# Patient Record
Sex: FEMALE | Race: OTHER | HISPANIC OR LATINO | Employment: UNEMPLOYED | URBAN - METROPOLITAN AREA
[De-identification: names, ages, dates, MRNs, and addresses within clinical notes are randomized per-mention and may not be internally consistent; named-entity substitution may affect disease eponyms.]

---

## 2017-05-23 ENCOUNTER — HOSPITAL ENCOUNTER (EMERGENCY)
Facility: HOSPITAL | Age: 27
Discharge: HOME | End: 2017-05-23
Admitting: EMERGENCY MEDICINE

## 2017-05-23 VITALS
SYSTOLIC BLOOD PRESSURE: 126 MMHG | OXYGEN SATURATION: 95 % | TEMPERATURE: 98.1 F | RESPIRATION RATE: 16 BRPM | DIASTOLIC BLOOD PRESSURE: 68 MMHG | HEART RATE: 83 BPM | WEIGHT: 210 LBS

## 2017-05-23 DIAGNOSIS — B35.4 TINEA CORPORIS: Primary | ICD-10-CM

## 2017-05-23 PROCEDURE — 99282 EMERGENCY DEPT VISIT SF MDM: CPT

## 2017-05-23 RX ORDER — CLOTRIMAZOLE 1 %
1 CREAM (GRAM) TOPICAL 2 TIMES DAILY
Qty: 30 G | Refills: 0 | Status: SHIPPED | OUTPATIENT
Start: 2017-05-23 | End: 2017-07-14

## 2017-07-14 ENCOUNTER — HOSPITAL ENCOUNTER (EMERGENCY)
Facility: HOSPITAL | Age: 27
Discharge: HOME | End: 2017-07-14
Attending: EMERGENCY MEDICINE | Admitting: EMERGENCY MEDICINE
Payer: COMMERCIAL

## 2017-07-14 ENCOUNTER — APPOINTMENT (EMERGENCY)
Dept: RADIOLOGY | Facility: HOSPITAL | Age: 27
End: 2017-07-14
Payer: COMMERCIAL

## 2017-07-14 VITALS
DIASTOLIC BLOOD PRESSURE: 73 MMHG | RESPIRATION RATE: 18 BRPM | HEART RATE: 91 BPM | WEIGHT: 216.05 LBS | SYSTOLIC BLOOD PRESSURE: 132 MMHG | OXYGEN SATURATION: 97 % | TEMPERATURE: 97.4 F

## 2017-07-14 DIAGNOSIS — M25.552 LEFT HIP PAIN: Primary | ICD-10-CM

## 2017-07-14 PROCEDURE — 73502 X-RAY EXAM HIP UNI 2-3 VIEWS: CPT

## 2017-07-14 PROCEDURE — 99283 EMERGENCY DEPT VISIT LOW MDM: CPT

## 2017-07-14 PROCEDURE — 96372 THER/PROPH/DIAG INJ SC/IM: CPT

## 2017-07-14 RX ORDER — KETOROLAC TROMETHAMINE 30 MG/ML
15 INJECTION, SOLUTION INTRAMUSCULAR; INTRAVENOUS ONCE
Status: COMPLETED | OUTPATIENT
Start: 2017-07-14 | End: 2017-07-14

## 2017-07-14 RX ORDER — LIDOCAINE 50 MG/G
1 PATCH TOPICAL ONCE
Status: DISCONTINUED | OUTPATIENT
Start: 2017-07-14 | End: 2017-07-14 | Stop reason: HOSPADM

## 2017-07-14 RX ADMIN — LIDOCAINE 1 PATCH: 50 PATCH CUTANEOUS at 10:40

## 2017-07-14 RX ADMIN — KETOROLAC TROMETHAMINE 15 MG: 30 INJECTION, SOLUTION INTRAMUSCULAR at 10:07

## 2017-11-07 ENCOUNTER — HOSPITAL ENCOUNTER (EMERGENCY)
Facility: HOSPITAL | Age: 27
Discharge: HOME | End: 2017-11-07
Admitting: EMERGENCY MEDICINE
Payer: COMMERCIAL

## 2017-11-07 ENCOUNTER — APPOINTMENT (EMERGENCY)
Dept: RADIOLOGY | Facility: HOSPITAL | Age: 27
End: 2017-11-07
Payer: COMMERCIAL

## 2017-11-07 VITALS
DIASTOLIC BLOOD PRESSURE: 71 MMHG | TEMPERATURE: 98.7 F | WEIGHT: 220.2 LBS | OXYGEN SATURATION: 100 % | SYSTOLIC BLOOD PRESSURE: 130 MMHG | RESPIRATION RATE: 18 BRPM | HEART RATE: 89 BPM

## 2017-11-07 DIAGNOSIS — S46.911A STRAIN OF RIGHT SHOULDER, INITIAL ENCOUNTER: Primary | ICD-10-CM

## 2017-11-07 PROCEDURE — 99283 EMERGENCY DEPT VISIT LOW MDM: CPT

## 2017-11-07 PROCEDURE — 73030 X-RAY EXAM OF SHOULDER: CPT

## 2017-11-07 RX ORDER — CYCLOBENZAPRINE HCL 5 MG
5 TABLET ORAL 3 TIMES DAILY PRN
Qty: 30 TABLET | Refills: 0 | Status: SHIPPED | OUTPATIENT
Start: 2017-11-07 | End: 2018-08-20

## 2017-11-07 RX ORDER — KETOROLAC TROMETHAMINE 30 MG/ML
30 INJECTION, SOLUTION INTRAMUSCULAR; INTRAVENOUS ONCE
Status: COMPLETED | OUTPATIENT
Start: 2017-11-07 | End: 2017-11-07

## 2017-11-07 RX ADMIN — KETOROLAC TROMETHAMINE 30 MG: 30 INJECTION, SOLUTION INTRAMUSCULAR at 17:53

## 2017-11-07 NOTE — ED PROVIDER NOTES
History  Chief Complaint   Patient presents with    Shoulder Pain     Collar bone sx in 2015  Reports right scapula pain where plate is when turning neck for the past two days  Patricia Cardoso is a 24 y o  female w PMH none significant who presents for evaluation of shoulder pain  Patient reports pain for the past 2-3 days in the right lateral aspect of the neck which radiates down into her shoulder and the lateral aspect of the collar bone  She has prior history of surgery to the collar bone after fracture from traumatic injury  She denies any trauma or injury now to cause pain  No numbness or paresthesias of the right upper extremity  There is no midline cervical neck tenderness  Does have some pain when she turns her head to the right or the left  None       History reviewed  No pertinent past medical history  Past Surgical History:   Procedure Laterality Date    SHOULDER SURGERY      R shoulder       History reviewed  No pertinent family history  I have reviewed and agree with the history as documented  Social History   Substance Use Topics    Smoking status: Current Every Day Smoker    Smokeless tobacco: Never Used    Alcohol use No        Review of Systems   Constitutional: Negative for chills, diaphoresis and fever  HENT: Negative for congestion and sore throat  Eyes: Negative for visual disturbance  Respiratory: Negative for cough, chest tightness, shortness of breath and wheezing  Cardiovascular: Negative for chest pain and leg swelling  Gastrointestinal: Negative for abdominal pain, constipation, diarrhea, nausea and vomiting  Genitourinary: Negative for difficulty urinating, dysuria, frequency, hematuria, urgency, vaginal bleeding, vaginal discharge and vaginal pain  Musculoskeletal: Positive for arthralgias  Negative for myalgias  Neurological: Negative for dizziness, weakness, light-headedness, numbness and headaches     Psychiatric/Behavioral: The patient is not nervous/anxious  Physical Exam  ED Triage Vitals [11/07/17 1702]   Temperature Pulse Respirations Blood Pressure SpO2   98 7 °F (37 1 °C) 89 18 130/71 100 %      Temp Source Heart Rate Source Patient Position - Orthostatic VS BP Location FiO2 (%)   Oral Monitor Sitting Right arm --      Pain Score       7           Orthostatic Vital Signs  Vitals:    11/07/17 1702   BP: 130/71   Pulse: 89   Patient Position - Orthostatic VS: Sitting       Physical Exam   Constitutional: She is oriented to person, place, and time  She appears well-developed and well-nourished  No distress  HENT:   Head: Normocephalic and atraumatic  Eyes: Pupils are equal, round, and reactive to light  Neck: Neck supple  No tracheal deviation present  Cardiovascular: Normal rate, regular rhythm and intact distal pulses  Exam reveals no gallop and no friction rub  No murmur heard  Pulmonary/Chest: Effort normal and breath sounds normal  No respiratory distress  She has no wheezes  She has no rales  Abdominal: Soft  Bowel sounds are normal  She exhibits no distension and no mass  There is no tenderness  There is no guarding  Musculoskeletal: She exhibits no edema or deformity  Mild pain to palpation to the anterior aspect of the shoulder joint and along the distal clavicle  However there is also pain along the trapezius muscles posteriorly  No midline cervical spine tenderness or spinal deformity or step-off  She does have full range of motion of the shoulder but reports pain to the trapezius when she is moving her shoulder  No instability of the shoulder  No specific pain overlying the AC joint  Neurological: She is alert and oriented to person, place, and time  Skin: Skin is warm and dry  She is not diaphoretic  Psychiatric: She has a normal mood and affect  Her behavior is normal    Nursing note and vitals reviewed        ED Medications  Medications   ketorolac (TORADOL) 30 mg/mL injection 30 mg (30 mg Intramuscular Given 11/7/17 8572)       Diagnostic Studies  Results Reviewed     None                 XR shoulder 2+ views RIGHT   ED Interpretation by Marcie Martinez PA-C (11/07 3792)   No acute abnormalities       Final Result by Lana Cockayne, MD (11/07 8699)      No acute osseous abnormality  Workstation performed: [id number]                    Procedures  Procedures       Phone Contacts  ED Phone Contact    ED Course  ED Course                                MDM  Number of Diagnoses or Management Options  Strain of right shoulder, initial encounter:   Diagnosis management comments: DDX includes but not ltd to:   Likely muscle strain trapezius muscles   Doubt there is dislodgement of her surgical screws as she has had no trauma / injury  Do not suspect carotid artery dissection     Plan is to obtain:  XR of affected joint(s) for acute osseous abnormality     Based on results:  No acute abnormality visualized  Will try muscle relaxer for neck  Ortho follow up  Return parameters discussed  Pt requires f/u as an outpt  Pt expresses understanding w above treatment plan  All questions answered prior to d/c  Portions of the record may have been created with voice recognition software   Occasional wrong word or "sound a like" substitutions may have occurred due to the inherent limitations of voice recognition software   Read the chart carefully and recognize, using context, where substitutions have occurred  CritCare Time    Disposition  Final diagnoses:   Strain of right shoulder, initial encounter     Time reflects when diagnosis was documented in both MDM as applicable and the Disposition within this note     Time User Action Codes Description Comment    11/7/2017  5:48 PM Karine Madsen Add [[id number]] Strain of right shoulder, initial encounter       ED Disposition     ED Disposition Condition Comment    Discharge  [address] discharge to home/self care      Condition at discharge: Good Follow-up Information     Follow up With Specialties Details Why [address] [facility] Emergency Department Emergency Medicine  If symptoms worsen [address] [facility]  [phone] [place] ED, [address] , Maura Jeter MD Orthopedic Surgery Call in 1 day  [address] [facility] [address] [facility]  [phone]           Discharge Medication List as of 11/7/2017  5:48 PM      START taking these medications    Details   cyclobenzaprine (FLEXERIL) 5 mg tablet Take 1 tablet by mouth 3 (three) times a day as needed for muscle spasms for up to 30 doses, Starting Tue 11/7/2017, Print           No discharge procedures on file      ED Provider  Electronically Signed by           Curtis Gr PA-C  11/07/17 0619

## 2017-11-07 NOTE — DISCHARGE INSTRUCTIONS
Muscle Strain   WHAT YOU NEED TO KNOW:   A muscle strain is a twist, pull, or tear of a muscle or tendon  A tendon is a strong elastic tissue that connects a muscle to a bone  Signs of a strained muscle include bruising and swelling over the area, pain with movement, and loss of strength  DISCHARGE INSTRUCTIONS:   Return to the emergency department if:   · You suddenly cannot feel or move your injured muscle  Contact your healthcare provider if:   · Your pain and swelling worsen or do not go away  · You have questions or concerns about your condition or care  Medicines:   · NSAIDs  help decrease swelling and pain or fever  This medicine is available with or without a doctor's order  NSAIDs can cause stomach bleeding or kidney problems in certain people  If you take blood thinner medicine, always ask your healthcare provider if NSAIDs are safe for you  Always read the medicine label and follow directions  · Muscle relaxers  help decrease pain and muscle spasms  · Take your medicine as directed  Contact your healthcare provider if you think your medicine is not helping or if you have side effects  Tell him of her if you are allergic to any medicine  Keep a list of the medicines, vitamins, and herbs you take  Include the amounts, and when and why you take them  Bring the list or the pill bottles to follow-up visits  Carry your medicine list with you in case of an emergency  Follow up with your healthcare provider as directed: Your healthcare provider may suggest that you have a follow-up visit before you go back to your usual activity  Write down your questions so you remember to ask them during your visits  Self-care:   · 3 to 7 days after the injury:  Use Rest, Ice, Compression, and Elevation (RICE) to help stop bruising and decrease pain and swelling  ¨ Rest:  Rest your muscle to allow your injury to heal  When the pain decreases, begin normal, slow movements   For mild and moderate muscle strains, you should rest your muscles for about 2 days  However, if you have a severe muscle strain, you should rest for 10 to 14 days  You may need to use crutches to walk if your muscle strain is in your legs or lower body  ¨ Ice:  Put an ice pack on the injured area  Put a towel between the ice pack and your skin  Do not put the ice pack directly on your skin  You can use a package of frozen peas instead of an ice pack  ¨ Compression:  You may need to wrap an elastic bandage around the area to decrease swelling  It should be tight enough for you to feel support  Do not wrap it too tightly  ¨ Elevation:  Keep the injured muscle raised above your heart if possible  For example if you have a strain of your lower leg muscle, lie down and prop your leg up on pillows  This helps decrease pain and swelling  · 3 to 21 days after the injury:  Start to slowly and regularly exercise your muscle  This will help it heal  If you feel pain, decrease how hard you are exercising  · 1 to 6 weeks after the injury:  Stretch the injured muscle  Hold the stretch for about 30 seconds  Do this 4 times a day  You may stretch the muscle until you feel a slight pull  Stop stretching if you feel pain  · 2 weeks to 6 months after the injury:  The goal of this phase is to return to the activity you were doing before the injury happened, without hurting the muscle again  · 3 weeks to 6 months after the injury:  Keep stretching and strengthening your muscles to avoid injury  Slowly increase the time and distance that you exercise  You may have signs and symptoms of muscle strain 6 months after the injury, even if you do things to help it heal  In this case, you may need surgery on the muscle  © 2017 [employer] [employer] Information is for End User's use only and may not be sold, redistributed or otherwise used for commercial purposes   All illustrations and images included in CareNotes® are the copyrighted property of [employer]  or Federico Gupta  The above information is an  only  It is not intended as medical advice for individual conditions or treatments  Talk to your doctor, nurse or pharmacist before following any medical regimen to see if it is safe and effective for you

## 2018-04-10 ENCOUNTER — TELEPHONE (OUTPATIENT)
Dept: OBGYN CLINIC | Facility: HOSPITAL | Age: 28
End: 2018-04-10

## 2018-04-13 ENCOUNTER — APPOINTMENT (OUTPATIENT)
Dept: RADIOLOGY | Facility: CLINIC | Age: 28
End: 2018-04-13
Payer: COMMERCIAL

## 2018-04-13 ENCOUNTER — OFFICE VISIT (OUTPATIENT)
Dept: OBGYN CLINIC | Facility: MEDICAL CENTER | Age: 28
End: 2018-04-13
Payer: COMMERCIAL

## 2018-04-13 VITALS
HEIGHT: 65 IN | WEIGHT: 209 LBS | SYSTOLIC BLOOD PRESSURE: 105 MMHG | DIASTOLIC BLOOD PRESSURE: 71 MMHG | HEART RATE: 86 BPM | BODY MASS INDEX: 34.82 KG/M2

## 2018-04-13 DIAGNOSIS — M89.8X1 PAIN OF RIGHT CLAVICLE: ICD-10-CM

## 2018-04-13 DIAGNOSIS — M89.8X1 PAIN OF RIGHT CLAVICLE: Primary | ICD-10-CM

## 2018-04-13 PROCEDURE — 73000 X-RAY EXAM OF COLLAR BONE: CPT

## 2018-04-13 PROCEDURE — 99214 OFFICE O/P EST MOD 30 MIN: CPT | Performed by: ORTHOPAEDIC SURGERY

## 2018-04-13 NOTE — PROGRESS NOTES
Orthopaedic Surgery - Office Note  Jarrett Molina (81 y o  female)   : 1990   MRN: [id number]  Encounter Date: 2018    No chief complaint on file  Assessment / Plan  S/p ORIF right distal clavicle fx, with persistent trapezius pain and subacromial bursitis    · Begin outpatient PT for scapular stabilizing and cervical program  · Anti-inflammatories or Tylenol prn pain   · She will be given work restriction of no repetitive overhead lifting or reaching for 3 months (end 18), after which I anticipate releasing her to full work duty  · Return in about 3 months (around 2018)  History of Present Illness  Izabel Rao is a 24 y o  female who presents for follow up of ORIF right distal clavicle fracture in 2015  She was lost to follow-up after last being seen on 3/3/16  She was doing well at that time  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  She does experience significant right shoulder pain after doing this  Denies numbness in the RUE  Occasionally has some swelling in the shoulder  Review of Systems  Pertinent items are noted in HPI  All other systems were reviewed and are negative  Physical Exam  /71   Pulse 86   Ht 5' 5" (1 651 m)   Wt 94 8 kg (209 lb)   BMI 34 78 kg/m²   Cons: Appears well  No apparent distress  Psych: Alert  Oriented x3  Mood and affect normal   Eyes: PERRLA, EOMI  Resp: Normal effort  No audible wheezing or stridor  CV: Palpable pulse  No discernable arrhythmia  No LE edema  Lymph:  No palpable cervical, axillary, or inguinal lymphadenopathy  Skin: Warm  No palpable masses  No visible lesions  Neuro: Normal muscle tone  Normal and symmetric DTR's  Right Shoulder Exam  Alignment / Posture:  mild scapular protraction  Inspection:  Incision healed    Palpation:  mild AC tenderness  ROM:  Normal shoulder ROM  Strength:  5/5 supraspinatus, infraspinatus, and subscapularis  Stability:  No objective shoulder instability  Tests: (+) [place]  (-) Belly press  (-) Speed  (-) [place]  (-) Cross-body adduction  Neurovascular:  Sensation intact in Ax/R/M/U nerve distributions  2+ radial pulse  Studies Reviewed  I have personally reviewed pertinent films in PACS  XR of right shoulder - healed distal clavicle fracture with anatomic alignment, appropriate alignment of AC joint wiht normal CC interval    Procedures  No procedures today  Medical, Surgical, Family, and Social History  The patient's medical history, family history, and social history, were reviewed and updated as appropriate  Past Medical History:   Diagnosis Date    Asthma        Past Surgical History:   Procedure Laterality Date    SHOULDER SURGERY      R shoulder       History reviewed  No pertinent family history  Social History     Occupational History    Not on file       Social History Main Topics    Smoking status: Current Every Day Smoker    Smokeless tobacco: Never Used    Alcohol use No    Drug use: No    Sexual activity: Not on file       No Known Allergies      Current Outpatient Prescriptions:     cyclobenzaprine (FLEXERIL) 5 mg tablet, Take 1 tablet by mouth 3 (three) times a day as needed for muscle spasms for up to 30 doses, Disp: 30 tablet, Rfl: 0      Poppy Osborne MD    Scribe Attestation    I,:    am acting as a scribe while in the presence of the attending physician :        I,:    personally performed the services described in this documentation    as scribed in my presence :

## 2018-05-14 ENCOUNTER — EVALUATION (OUTPATIENT)
Dept: PHYSICAL THERAPY | Facility: CLINIC | Age: 28
End: 2018-05-14
Payer: COMMERCIAL

## 2018-05-14 DIAGNOSIS — M89.8X1 PAIN OF RIGHT CLAVICLE: ICD-10-CM

## 2018-05-14 PROCEDURE — G8990 OTHER PT/OT CURRENT STATUS: HCPCS | Performed by: PHYSICAL MEDICINE & REHABILITATION

## 2018-05-14 PROCEDURE — 97161 PT EVAL LOW COMPLEX 20 MIN: CPT | Performed by: PHYSICAL MEDICINE & REHABILITATION

## 2018-05-14 PROCEDURE — G8991 OTHER PT/OT GOAL STATUS: HCPCS | Performed by: PHYSICAL MEDICINE & REHABILITATION

## 2018-05-14 PROCEDURE — 97110 THERAPEUTIC EXERCISES: CPT | Performed by: PHYSICAL MEDICINE & REHABILITATION

## 2018-05-14 NOTE — PROGRESS NOTES
PT Evaluation     Today's date: 2018  Patient name: Cony Goldman  : 1990  MRN: [id number]  Referring provider: Flor Rdz MD  Dx:   Encounter Diagnosis     ICD-10-CM    1  Pain of right clavicle M89 8X1 Ambulatory referral to Physical Therapy       Start Time: 08  Stop Time: 09  Total time in clinic (min): 45 minutes    Assessment  Impairments: abnormal or restricted ROM, impaired physical strength, lacks appropriate home exercise program, pain with function and scapular dyskinesis    Assessment details: Pt is a 24 y o  female presenting to outpatient physical therapy with Pain of right clavicle, right posterior shoulder   Pt presents with pain, decreased range of motion, decreased strength, and decreased tolerance to activity  Pt presents with signs and symptoms consistent with scapular dyskinesia and diffuse muscular weakness  Pt would benefit from skilled physical therapy to address limitations and to achieve goals  Thank you for this referral    Understanding of Dx/Px/POC: good   Prognosis: good    Goals  ST  Patient will report 25% decrease in pain in 4 weeks  2  Patient will demonstrate 25% improvement in ROM in 4 weeks  3  Patient will demonstrate 1/2 grade improvement in strength in 4 weeks  LT  Patient will be able to perform IADLS without restriction or pain by discharge  2  Patient will be independent in HEP by discharge  3  Patient will be able to return to recreational/work duties without restriction or pain by discharge        Plan  Patient would benefit from: PT eval and skilled PT  Planned modality interventions: biofeedback, cryotherapy, TENS and thermotherapy: hydrocollator packs  Planned therapy interventions: abdominal trunk stabilization, functional ROM exercises, home exercise program, therapeutic exercise, therapeutic activities, strengthening, stretching, postural training, patient education, neuromuscular re-education, manual therapy and joint mobilization  Frequency: 2x week  Duration in weeks: 4  Treatment plan discussed with: patient        Subjective Evaluation    History of Present Illness  Mechanism of injury: Pt reports experiencing R shoulder pain for approximately 6 months  She reports increased pain with certain movements and states that she is able to tolerate the increase in pain  Pt reports weakness and that the R UE feels heavy  Pain  Current pain ratin  At best pain ratin  At worst pain rating: 10  Location: Posterior shoulder  Quality: discomfort and sharp  Relieving factors: rest  Aggravating factors: overhead activity and lifting  Progression: worsening      Diagnostic Tests  X-ray: normal  Patient Goals  Patient goals for therapy: increased strength, decreased pain, decreased edema and increased motion          Objective     Static Posture     Head  Forward  Shoulders  Rounded  Palpation     Right Tenderness of the upper trapezius  Tenderness     Right Shoulder  Tenderness in the [facility] joint, clavicle, coracoid process and scapular spine       Active Range of Motion   Cervical/Thoracic Spine   Cervical    Flexion: 40 degrees with pain  Extension: 45 degrees   Left lateral flexion: 45 degrees   Right lateral flexion: 35 degrees with pain  Left rotation: 75 degrees   Right rotation: 75 degrees   Left Shoulder   Flexion: 165 degrees   Abduction: 170 degrees   External rotation BTH: T6   Internal rotation BTB: T6     Right Shoulder   Flexion: 150 degrees   Abduction: 135 degrees   External rotation BTH: T1   Internal rotation BTB: T8     Passive Range of Motion     Right Shoulder   Flexion: 165 degrees   Abduction: 180 degrees   External rotation 90°: 90 degrees   Internal rotation 90°: 90 degrees     Scapular Mobility     Right Shoulder   Scapular Mobility with Shoulder to 90° FF   Scapular winging: moderate  Scapular elevation: severe  Upward rotation: excessive    Scapular Mobility beyond 90° FF   Upward rotation: excessive    Strength/Myotome Testing     Left Shoulder     Planes of Motion   Flexion: 5   Extension: 5   Abduction: 5   Adduction: 5   External rotation at 0°: 5   Internal rotation at 0°: 5     Isolated Muscles   Lower trapezius: 4   Middle trapezius: 4     Right Shoulder     Planes of Motion   Flexion: 4-   Abduction: 3+   External rotation at 45°: 4   Internal rotation at 0°: 4     Isolated Muscles   Lower trapezius: 4-   Middle trapezius: 4-     Tests     Right Shoulder   Positive active compression ([place]) and scapular assistance          Flowsheet Rows      Most Recent Value   PT/OT G-Codes   Current Score  50   Projected Score  72   FOTO information reviewed  Yes   Assessment Type  Evaluation   G code set  Other PT/OT Primary   Other PT Primary Current Status ()  CK   Other PT Primary Goal Status ()  CJ          Precautions: none    Daily Treatment Diary     Manual              Shoulder PROM             Upper trap contract relax                                                        Exercise Diary              Pulleys             Upper trap stretch             Doorway stretch             Prone YTI             TB rows             TB ext             TB ER             Shoulder AROM (pain free, to 90 deg @ mirror) Scaption/ABD             D1 flex/ext TB             D2 flex/ext TB             Chin tuck c biofeedback                                                                                                                                                   Modalities              CP PRN

## 2018-05-21 ENCOUNTER — OFFICE VISIT (OUTPATIENT)
Dept: PHYSICAL THERAPY | Facility: CLINIC | Age: 28
End: 2018-05-21
Payer: COMMERCIAL

## 2018-05-21 DIAGNOSIS — M89.8X1 PAIN OF RIGHT CLAVICLE: Primary | ICD-10-CM

## 2018-05-21 PROCEDURE — 97110 THERAPEUTIC EXERCISES: CPT | Performed by: PHYSICAL MEDICINE & REHABILITATION

## 2018-05-21 PROCEDURE — 97150 GROUP THERAPEUTIC PROCEDURES: CPT | Performed by: PHYSICAL MEDICINE & REHABILITATION

## 2018-05-21 PROCEDURE — 97112 NEUROMUSCULAR REEDUCATION: CPT | Performed by: PHYSICAL MEDICINE & REHABILITATION

## 2018-05-21 NOTE — PROGRESS NOTES
Daily Note     Today's date: 2018  Patient name: Aden Xiao  : 1990  MRN: [id number]  Referring provider: Sj Epps MD  Dx:   Encounter Diagnosis     ICD-10-CM    1  Pain of right clavicle M89 8X1        Start Time: 930  Stop Time: 102  Total time in clinic (min): 50 minutes    Subjective: Pt reports 0/10 pain, she reports compliance with HEP and states that the stretches are difficult but she feels better after       Objective: See treatment diary below  Precautions: none     Daily Treatment Diary      Manual                        Shoulder PROM                       Upper trap contract relax                                                                                                     Exercise Diary                        Pulleys  5'                     Upper trap stretch  3 x 30" ea                     Doorway stretch  3 x 30"                     Prone YTI                       TB rows  OTB x 10 c 3" hold                     TB ext  OTB x 10 c 3" hold                     TB ER  NV                     Shoulder AROM (pain free, to 90 deg @ mirror) Scaption/ABD  NV                     D1 flex/ext TB  YTB x 10 ea                     D2 flex/ext TB  YTB x 10 ea                     Chin tuck c biofeedback  seated x 10 c 10" hold(progress NV)                                                                                                                                                                                                                                                                           Modalities                        CP PRN                                                                                      Assessment: Tolerated treatment well  Patient demonstrated fatigue post treatment and required verbal, visual, and tactile cueing for proper performance of PNF patterns and to correct forward head posture   The pt compensates with shrug on the R with onset of fatigue  Plan: Progress treatment as tolerated

## 2018-05-24 ENCOUNTER — OFFICE VISIT (OUTPATIENT)
Dept: PHYSICAL THERAPY | Facility: CLINIC | Age: 28
End: 2018-05-24
Payer: COMMERCIAL

## 2018-05-24 DIAGNOSIS — M89.8X1 PAIN OF RIGHT CLAVICLE: Primary | ICD-10-CM

## 2018-05-24 PROCEDURE — 97110 THERAPEUTIC EXERCISES: CPT | Performed by: PHYSICAL MEDICINE & REHABILITATION

## 2018-05-24 PROCEDURE — 97112 NEUROMUSCULAR REEDUCATION: CPT | Performed by: PHYSICAL MEDICINE & REHABILITATION

## 2018-05-24 NOTE — PROGRESS NOTES
Daily Note     Today's date: 2018  Patient name: Terri Banda  : 1990  MRN: [id number]  Referring provider: Vero Arana MD  Dx:   Encounter Diagnosis     ICD-10-CM    1  Pain of right clavicle M89 8X1        Start Time: 930  Stop Time: 1020  Total time in clinic (min): 50 minutes    Subjective: Pt reports 0/10 pain, and states that she experienced a little soreness after last visit  Objective: See treatment diary below  Precautions: none     Daily Treatment Diary      Manual                        Shoulder PROM                       Upper trap contract relax                                                                                                     Exercise Diary                      Pulleys  5'  5'                   Upper trap stretch  3 x 30" ea  3 x 30"                   Doorway stretch  3 x 30"  3 x 30"                   Prone YTI                       TB rows  OTB x 10 c 3" hold  OTB x 15 c 3" hold                   TB ext  OTB x 10 c 3" hold  OTB x 15 c 3" hold                   TB ER  NV   OTB x15                   Shoulder AROM (pain free, to 90 deg @ mirror) Scaption/ABD  NV  NV                   D1 flex/ext TB  YTB x 10 ea  OTB x 10                   D2 flex/ext TB  YTB x 10 ea OTB x 10                   Chin tuck c biofeedback  seated x 10 c 10" hold(progress NV)  hooklying x 10 to yellow, x 10 to purple                                                                                                                                                                                                                                                                         Modalities                        CP PRN                                                                                   Assessment: Tolerated treatment well   Patient demonstrated fatigue post treatment, exhibited good technique with therapeutic exercises and good NM control with deep neck flexor activities  Plan: Progress treatment as tolerated

## 2018-05-29 ENCOUNTER — APPOINTMENT (OUTPATIENT)
Dept: PHYSICAL THERAPY | Facility: CLINIC | Age: 28
End: 2018-05-29
Payer: COMMERCIAL

## 2018-05-30 ENCOUNTER — OFFICE VISIT (OUTPATIENT)
Dept: PHYSICAL THERAPY | Facility: CLINIC | Age: 28
End: 2018-05-30
Payer: COMMERCIAL

## 2018-05-30 DIAGNOSIS — M89.8X1 PAIN OF RIGHT CLAVICLE: Primary | ICD-10-CM

## 2018-05-30 PROCEDURE — 97110 THERAPEUTIC EXERCISES: CPT

## 2018-05-30 NOTE — PROGRESS NOTES
Daily Note     Today's date: 2018  Patient name: Jarrett Molina  : 1990  MRN: [id number]  Referring provider: Krista Marlow MD  Dx:   Encounter Diagnosis     ICD-10-CM    1  Pain of right clavicle M89 8X1                   Subjective: Pt reported mild soreness after last therapy in shoulder, but currently denied pain prior to beginning today  Objective: See treatment diary below  Precautions: none     Daily Treatment Diary      Manual                        Shoulder PROM                       Upper trap contract relax                                                                                                     Exercise Diary                    Pulleys  5'  5'  5'                 Upper trap stretch  3 x 30" ea  3 x 30"  30"x3                 Doorway stretch  3 x 30"  3 x 30" 30"x3                 Prone YTI                       TB rows  OTB x 10 c 3" hold  OTB x 15 c 3" hold  OTB 3" x15                 TB ext  OTB x 10 c 3" hold  OTB x 15 c 3" hold  OTB 3"x15                 TB ER  NV   OTB x15  OTB  x15                 Shoulder AROM (pain free, to 90 deg @ mirror) Scaption/ABD  NV  NV  3"  x10 ea                 D1 flex/ext TB  YTB x 10 ea  OTB x 10  OTB x15                 D2 flex/ext TB  YTB x 10 ea OTB x 10  OTB x15                 Chin tuck c biofeedback  seated x 10 c 10" hold(progress NV)  hooklying x 10 to yellow, x 10 to purple  h/l  10" x10   @20  mmHg                                                                                                                                                                                                                                                                       Modalities   5/30                     CP PRN  10' post                                                                                 Assessment: Tolerated treatment well   Good form and technique with current program  Great DNF control with biofeedback exercise  She noted mild soreness in shoulder post exercises, therefore concluded with CP  Plan: Progress treatment as tolerated

## 2018-05-31 ENCOUNTER — OFFICE VISIT (OUTPATIENT)
Dept: PHYSICAL THERAPY | Facility: CLINIC | Age: 28
End: 2018-05-31
Payer: COMMERCIAL

## 2018-05-31 DIAGNOSIS — M89.8X1 PAIN OF RIGHT CLAVICLE: Primary | ICD-10-CM

## 2018-05-31 PROCEDURE — 97110 THERAPEUTIC EXERCISES: CPT | Performed by: PHYSICAL MEDICINE & REHABILITATION

## 2018-05-31 PROCEDURE — 97150 GROUP THERAPEUTIC PROCEDURES: CPT | Performed by: PHYSICAL MEDICINE & REHABILITATION

## 2018-05-31 NOTE — PROGRESS NOTES
Daily Note     Today's date: 2018  Patient name: Sri Martell  : 1990  MRN: [id number]  Referring provider: Yaritza Rich MD  Dx:   Encounter Diagnosis     ICD-10-CM    1  Pain of right clavicle M89 8X1        Start Time: 930  Stop Time: 1015  Total time in clinic (min): 45 minutes    Subjective: Pt reports 0/10 pain, but reports having muscular soreness after last visit        Objective: See treatment diary below  Precautions: none     Daily Treatment Diary      Manual                        Shoulder PROM                       Upper trap contract relax                                                                                                     Exercise Diary                  UBE    2'/2'         Pulleys  5'  5'  5'                 Upper trap stretch  3 x 30" ea  3 x 30"  30"x3                 Doorway stretch  3 x 30"  3 x 30" 30"x3                 Prone YTI        15 ea c 5" hold               TB rows  OTB x 10 c 3" hold  OTB x 15 c 3" hold  OTB 3" x15                 TB ext  OTB x 10 c 3" hold  OTB x 15 c 3" hold  OTB 3"x15                 TB ER  NV   OTB x15  OTB  x15                 Shoulder AROM (pain free, to 90 deg @ mirror) Scaption/ABD  NV  NV  3"  x10 ea                 D1 flex/ext TB  YTB x 10 ea  OTB x 10  OTB x15                 D2 flex/ext TB  YTB x 10 ea OTB x 10  OTB x15                 Chin tuck c biofeedback  seated x 10 c 10" hold(progress NV)  hooklying x 10 to yellow, x 10 to purple  h/l  10" x10   @20  mmHg  10" x 10 @20, to purp/org ea                                                                                                                                                                                                                                                                     Modalities   30                     CP PRN  10' post                                                                                 Assessment: Tolerated treatment well  Patient demonstrated fatigue post treatment and would benefit from continued SLP      Plan: Progress treatment as tolerated  Continue scap stabilization and RTC strength

## 2018-06-04 ENCOUNTER — OFFICE VISIT (OUTPATIENT)
Dept: PHYSICAL THERAPY | Facility: CLINIC | Age: 28
End: 2018-06-04
Payer: COMMERCIAL

## 2018-06-04 DIAGNOSIS — M89.8X1 PAIN OF RIGHT CLAVICLE: Primary | ICD-10-CM

## 2018-06-04 PROCEDURE — 97110 THERAPEUTIC EXERCISES: CPT

## 2018-06-04 NOTE — PROGRESS NOTES
Daily Note     Today's date: 2018  Patient name: Luis Montes  : 1990  MRN: [id number]  Referring provider: Starlin Sicard, MD  Dx:   Encounter Diagnosis     ICD-10-CM    1  Pain of right clavicle M89 8X1        Start Time: 930  Stop Time: 1005  Total time in clinic (min): 35 minutes    Subjective: Pt reports 0/10 pain, but reports having increased stiffness today         Objective: See treatment diary below  Precautions: none     Daily Treatment Diary      Manual                        Shoulder PROM                       Upper trap contract relax                                                                                                     Exercise Diary     6             UBE    2'/2' 2'/2'        Pulleys  5'  5'  5'   5'             Upper trap stretch  3 x 30" ea  3 x 30"  30"x3   3x30"             Doorway stretch  3 x 30"  3 x 30" 30"x3   3x30"             Prone YTI        15 ea c 5" hold  15 ea c 5" hold             TB rows  OTB x 10 c 3" hold  OTB x 15 c 3" hold  OTB 3" x15    OTB 3" x15             TB ext  OTB x 10 c 3" hold  OTB x 15 c 3" hold  OTB 3"x15    OTB 3" x15             TB ER  NV   OTB x15  OTB  x15    OTB 3" x15             Shoulder AROM (pain free, to 90 deg @ mirror) Scaption/ABD  NV  NV  3"  x10 ea   10x3"             D1 flex/ext TB  YTB x 10 ea  OTB x 10  OTB x15    OTB x15             D2 flex/ext TB  YTB x 10 ea OTB x 10  OTB x15    OTB x15             Chin tuck c biofeedback  seated x 10 c 10" hold(progress NV)  hooklying x 10 to yellow, x 10 to purple  h/l  10" x10   @20  mmHg  10" x 10 @20, to purp/org ea 10" x 10 @20, to purp/org ea                                                                                                                                                                                                                                                                   Modalities   5/30                     CP PRN  10' post                                                                         6/4/18: Pt unsupervised from 10:00 am - 10:05 am, treated 1:1 remainder of session  Assessment: Tolerated treatment well  Patient demonstrated fatigue post treatment and would benefit from continued PT  Pt continues to work towards goals of increased cervical strength and scapular stabilization  Pt will benefit from further skilled PT to increase strength, flexibility and function  Plan: Progress treatment as tolerated  Continue scap stabilization and RTC strength

## 2018-06-07 ENCOUNTER — APPOINTMENT (OUTPATIENT)
Dept: PHYSICAL THERAPY | Facility: CLINIC | Age: 28
End: 2018-06-07
Payer: COMMERCIAL

## 2018-06-11 ENCOUNTER — OFFICE VISIT (OUTPATIENT)
Dept: PHYSICAL THERAPY | Facility: CLINIC | Age: 28
End: 2018-06-11
Payer: COMMERCIAL

## 2018-06-11 DIAGNOSIS — M89.8X1 PAIN OF RIGHT CLAVICLE: Primary | ICD-10-CM

## 2018-06-11 PROCEDURE — 97110 THERAPEUTIC EXERCISES: CPT | Performed by: PHYSICAL MEDICINE & REHABILITATION

## 2018-06-11 NOTE — PROGRESS NOTES
Daily Note     Today's date: 2018  Patient name: Jaime Frank  : 1990  MRN: [id number]  Referring provider: Khadra Araujo MD  Dx:   Encounter Diagnosis     ICD-10-CM    1  Pain of right clavicle M89 8X1                   Subjective: Pt reports 0/10 pain, notes "uncomfortable" feeling attributed to weather          Objective: See treatment diary below  Precautions: none     Daily Treatment Diary      Manual                        Shoulder PROM                       Upper trap contract relax                                                                                                     Exercise Diary              UBE    2'/2' 2'/2' 2'/2'       Pulleys  5'  5'  5'   5'  5'           Upper trap stretch  3 x 30" ea  3 x 30"  30"x3   3x30"  3x30"           Doorway stretch  3 x 30"  3 x 30" 30"x3   3x30"  3x30"           Prone YTI        15 ea c 5" hold  15 ea c 5" hold  15x5" ea           TB rows  OTB x 10 c 3" hold  OTB x 15 c 3" hold  OTB 3" x15    OTB 3" x15  BTB 20x3"           TB ext  OTB x 10 c 3" hold  OTB x 15 c 3" hold  OTB 3"x15    OTB 3" x15  BTB 20x3"           TB ER  NV   OTB x15  OTB  x15    OTB 3" x15  OTB 20x3"           Shoulder AROM (pain free, to 90 deg @ mirror) Scaption/ABD  NV  NV  3"  x10 ea   10x3"  15x ea           D1 flex/ext TB  YTB x 10 ea  OTB x 10  OTB x15    OTB x15  OTB 20x           D2 flex/ext TB  YTB x 10 ea OTB x 10  OTB x15    OTB x15  OTB 20x           Chin tuck c biofeedback  seated x 10 c 10" hold(progress NV)  hooklying x 10 to yellow, x 10 to purple  h/l  10" x10   @20  mmHg  10" x 10 @20, to purp/org ea 10" x 10 @20, to purp/org ea  10x10", 20 --> purple                                                                                                                                                                                                                                                                 Modalities     6/11                   CP PRN  10' post  10' post                                                                       Assessment: Tolerated treatment well  Patient demonstrated fatigue post treatment and would benefit from continued PT  Verbal cues for form maintenance throughout to maintain scap position and posture  Plan: Progress treatment as tolerated  Continue scap stabilization and RTC strength

## 2018-06-14 ENCOUNTER — EVALUATION (OUTPATIENT)
Dept: PHYSICAL THERAPY | Facility: CLINIC | Age: 28
End: 2018-06-14
Payer: COMMERCIAL

## 2018-06-14 DIAGNOSIS — M89.8X1 PAIN OF RIGHT CLAVICLE: Primary | ICD-10-CM

## 2018-06-14 PROCEDURE — G8990 OTHER PT/OT CURRENT STATUS: HCPCS | Performed by: PHYSICAL MEDICINE & REHABILITATION

## 2018-06-14 PROCEDURE — G8991 OTHER PT/OT GOAL STATUS: HCPCS | Performed by: PHYSICAL MEDICINE & REHABILITATION

## 2018-06-14 PROCEDURE — 97112 NEUROMUSCULAR REEDUCATION: CPT | Performed by: PHYSICAL MEDICINE & REHABILITATION

## 2018-06-14 PROCEDURE — 97150 GROUP THERAPEUTIC PROCEDURES: CPT | Performed by: PHYSICAL MEDICINE & REHABILITATION

## 2018-06-14 PROCEDURE — 97110 THERAPEUTIC EXERCISES: CPT | Performed by: PHYSICAL MEDICINE & REHABILITATION

## 2018-06-14 PROCEDURE — 97140 MANUAL THERAPY 1/> REGIONS: CPT | Performed by: PHYSICAL MEDICINE & REHABILITATION

## 2018-06-14 NOTE — PROGRESS NOTES
PT Evaluation     Today's date: 2018  Patient name: Elana Hough  : 1990  MRN: [id number]  Referring provider: Soheila Yap MD  Dx:   Encounter Diagnosis     ICD-10-CM    1  Pain of right clavicle M89 8X1        Start Time: 930  Stop Time: 1020  Total time in clinic (min): 50 minutes    Assessment  Impairments: abnormal or restricted ROM, impaired physical strength, lacks appropriate home exercise program and pain with function    Assessment details: Izabel has been attending outpatient physical therapy with Pain of right clavicle  Since the last assessment, patient demonstrates decreased pain levels, improved range of motion, improved strength, and increased tolerance to activity  Pt continues to present with pain, demonstrate decreased active range of motion, decreased strength, and decreased tolerance to activity  Pt would benefit from continued skilled physical therapy to address limitations and to achieve goals  Thank you for this referral    Understanding of Dx/Px/POC: good   Prognosis: good    Goals  ST  Patient will report 25% decrease in pain in 4 weeks  MET  2  Patient will demonstrate 25% improvement in ROM in 4 weeks  MET  3  Patient will demonstrate 1/2 grade improvement in strength in 4 weeks  MET    LT  Patient will be able to perform IADLS without restriction or pain by discharge  Partially met  2  Patient will be independent in HEP by discharge  Partially met  3  Patient will be able to return to recreational/work duties without restriction or pain by discharge   Partially met      Plan  Patient would benefit from: skilled PT  Planned modality interventions: biofeedback, cryotherapy, TENS and thermotherapy: hydrocollator packs  Planned therapy interventions: abdominal trunk stabilization, functional ROM exercises, home exercise program, therapeutic exercise, therapeutic activities, strengthening, stretching, postural training, patient education, neuromuscular re-education, manual therapy and joint mobilization  Frequency: 2x week  Duration in weeks: 4  Treatment plan discussed with: patient  Plan details: Continue to progress scapular stabilization, RTC strength, ROM  Addition of muscular endurance, and T/S mobilization  Subjective Evaluation    History of Present Illness  Mechanism of injury: Pt reports experiencing R shoulder pain for approximately 6 months  She reports increased pain with certain movements and states that she is able to tolerate the increase in pain  Pt reports weakness and that the R UE feels heavy  (18) Pt reports that she is overall at 80% functional ability  She states that some days she has no pain, but other times her arm feels heavy  Her chief complaint is stiffness in the neck and upper back, between the shoulder blades  Pain  Current pain ratin  At best pain ratin  At worst pain ratin  Location: Posterior shoulder  Quality: discomfort and sharp  Relieving factors: rest  Aggravating factors: overhead activity and lifting  Progression: improved      Diagnostic Tests  X-ray: normal  Patient Goals  Patient goals for therapy: increased strength, decreased pain and increased motion          Objective     Static Posture     Head  Forward  Shoulders  Rounded  Palpation     Right Tenderness of the upper trapezius  Tenderness     Right Shoulder  Tenderness in the [facility] joint and clavicle  No tenderness in the coracoid process and scapular spine       Cervical/Thoracic Screen   Cervical range of motion within normal limits with the following exceptions: FB 40  BB 40  RSB 35  LSB 30    Active Range of Motion   Cervical/Thoracic Spine   Cervical    Flexion: 40 degrees with pain  Extension: 45 degrees   Left lateral flexion: 45 degrees   Right lateral flexion: 35 degrees with pain  Left rotation: 75 degrees   Right rotation: 75 degrees   Left Shoulder   Flexion: 165 degrees   Abduction: 170 degrees   External rotation BTH: T6   Internal rotation BTB: T6     Right Shoulder   Flexion: 155 degrees   Abduction: 140 degrees   External rotation BTH: T4   Internal rotation BTB: T8     Passive Range of Motion     Right Shoulder   Flexion: 170 degrees   Abduction: 180 degrees   External rotation 90°: 90 degrees   Internal rotation 90°: 90 degrees     Scapular Mobility     Right Shoulder   Scapular Mobility with Shoulder to 90° FF   Scapular winging: minimal  Scapular elevation: moderate    Strength/Myotome Testing     Left Shoulder     Planes of Motion   Flexion: 5   Extension: 5   Abduction: 5   Adduction: 5   External rotation at 0°: 5   Internal rotation at 0°: 5     Isolated Muscles   Lower trapezius: 4   Middle trapezius: 4     Right Shoulder     Planes of Motion   Flexion: 4   Abduction: 4   External rotation at 45°: 4   Internal rotation at 0°: 4     Isolated Muscles   Lower trapezius: 4-   Middle trapezius: 4-     Tests     Right Shoulder   Positive active compression ([place]) and scapular assistance          Flowsheet Rows      Most Recent Value   PT/OT G-Codes   Current Score  63   Projected Score  72   FOTO information reviewed  Yes   Assessment Type  Re-evaluation   G code set  Other PT/OT Primary   Other PT Primary Current Status ()  CJ   Other PT Primary Goal Status ()  CJ          Precautions: none     Daily Treatment Diary      Manual                        Shoulder PROM                       Upper trap contract relax                        T/S PA mobs                                                                             Exercise Diary   5/21 5/24 5/30 5/31 6/4 6/11 6/14         UBE       2'/2' 2'/2' 2'/2'  3'/3'         Pulleys  5'  5'  5'   5'  5'           Upper trap stretch  3 x 30" ea  3 x 30"  30"x3   3x30"  3x30"           Doorway stretch  3 x 30"  3 x 30" 30"x3   3x30"  3x30"           Prone YTI  HEP        15 ea c 5" hold  15 ea c 5" hold  15x5" ea  HEP         TB rows  OTB x 10 c 3" hold  OTB x 15 c 3" hold  OTB 3" x15    OTB 3" x15  BTB 20x3"           TB ext  OTB x 10 c 3" hold  OTB x 15 c 3" hold  OTB 3"x15    OTB 3" x15  BTB 20x3"           TB ER  NV   OTB x15  OTB  x15    OTB 3" x15  OTB 20x3"           Shoulder AROM (pain free, to 90 deg @ mirror) Scaption/ABD  NV  NV  3"  x10 ea   10x3"  15x ea           D1 flex/ext TB  YTB x 10 ea  OTB x 10  OTB x15    OTB x15  OTB 20x           D2 flex/ext TB  YTB x 10 ea OTB x 10  OTB x15    OTB x15  OTB 20x           Chin tuck c biofeedback  seated x 10 c 10" hold(progress NV)  hooklying x 10 to yellow, x 10 to purple  h/l  10" x10   @20  mmHg  10" x 10 @20, to purp/org ea 10" x 10 @20, to purp/org ea  10x10", 20 --> purple            TB IR              GTB  20           body blade              flex 2 x 30"          scap stabilization c med ball 30" ea                       BTB strap                        TB BTB raise              YTB  X 20                                                                                                                                       Modalities   5/30  6/11                   CP PRN  10' post  10' post

## 2018-06-18 ENCOUNTER — OFFICE VISIT (OUTPATIENT)
Dept: PHYSICAL THERAPY | Facility: CLINIC | Age: 28
End: 2018-06-18
Payer: COMMERCIAL

## 2018-06-18 DIAGNOSIS — M89.8X1 PAIN OF RIGHT CLAVICLE: Primary | ICD-10-CM

## 2018-06-18 PROCEDURE — 97140 MANUAL THERAPY 1/> REGIONS: CPT

## 2018-06-18 PROCEDURE — 97110 THERAPEUTIC EXERCISES: CPT

## 2018-06-18 PROCEDURE — 97112 NEUROMUSCULAR REEDUCATION: CPT

## 2018-06-18 NOTE — PROGRESS NOTES
Daily Note     Today's date: 2018  Patient name: Beatris Call  : 1990  MRN: [id number]  Referring provider: Adali Peralta MD  Dx:   Encounter Diagnosis     ICD-10-CM    1  Pain of right clavicle M89 8X1        Start Time: 930  Stop Time: 1010  Total time in clinic (min): 40 minutes    Subjective: Pt reports feeling ok today, no new C/O pain  Pt states just stiffness in upper back         Objective: See treatment diary below    Precautions: none     Daily Treatment Diary      Manual                        Shoulder PROM                       Upper trap contract relax                        T/S PA [facility]                                                                           Exercise Diary          UBE       2'/2' 2'/2' 2'/2'  3'/3' 3'/3'       Pulleys  5'  5'  5'   5'  5'   5'        Upper trap stretch  3 x 30" ea  3 x 30"  30"x3   3x30"  3x30"           Doorway stretch  3 x 30"  3 x 30" 30"x3   3x30"  3x30"    3x30"       Prone YTI  HEP        15 ea c 5" hold  15 ea c 5" hold  15x5" ea  HEP  --       TB rows  OTB x 10 c 3" hold  OTB x 15 c 3" hold  OTB 3" x15    OTB 3" x15  BTB 20x3"    BTB 20x3"       TB ext  OTB x 10 c 3" hold  OTB x 15 c 3" hold  OTB 3"x15    OTB 3" x15  BTB 20x3"    BTB 20x3"       TB ER  NV   OTB x15  OTB  x15    OTB 3" x15  OTB 20x3"   OTB 20x3"       Shoulder AROM (pain free, to 90 deg @ mirror) Scaption/ABD  NV  NV  3"  x10 ea   10x3"  15x ea           D1 flex/ext TB  YTB x 10 ea  OTB x 10  OTB x15    OTB x15  OTB 20x           D2 flex/ext TB  YTB x 10 ea OTB x 10  OTB x15    OTB x15  OTB 20x           Chin tuck c biofeedback  seated x 10 c 10" hold(progress NV)  hooklying x 10 to yellow, x 10 to purple  h/l  10" x10   @20  mmHg  10" x 10 @20, to purp/org ea 10" x 10 @20, to purp/org ea  10x10", 20 --> purple            TB IR              GTB  20  GTB 20x         body blade              flex 2 x 30" flex 2 x 30"        scap stabilization c med ball 30" ea               Red 30' ea       BTB strap               3x30"        TB BTB raise              YTB  X 20  YTB  20x                                                                                                                                     Modalities   5/30 6/11                   CP PRN  10' post  10' post                                                                        6/18/18: Pt supervised by [facility] DPT from 10:05-10:10, treated 1:1 remainder of session  Assessment: Tolerated treatment well  Patient demonstrated fatigue post treatment and would benefit from continued PT  PT performed mobilizations to improve Upper thoracic mobility, Pt shows increased function and decreased pain post mobilizations  Pt will benefit from further skilled Pt to increase strength, flexibility and function  Continue to progress as able  Plan: Progress treatment as tolerated  Continue scap stabilization and RTC strength

## 2018-06-20 ENCOUNTER — APPOINTMENT (OUTPATIENT)
Dept: PHYSICAL THERAPY | Facility: CLINIC | Age: 28
End: 2018-06-20
Payer: COMMERCIAL

## 2018-06-25 ENCOUNTER — OFFICE VISIT (OUTPATIENT)
Dept: PHYSICAL THERAPY | Facility: CLINIC | Age: 28
End: 2018-06-25
Payer: COMMERCIAL

## 2018-06-25 DIAGNOSIS — M89.8X1 PAIN OF RIGHT CLAVICLE: Primary | ICD-10-CM

## 2018-06-25 PROCEDURE — 97110 THERAPEUTIC EXERCISES: CPT

## 2018-06-25 PROCEDURE — 97112 NEUROMUSCULAR REEDUCATION: CPT

## 2018-06-25 PROCEDURE — 97140 MANUAL THERAPY 1/> REGIONS: CPT

## 2018-06-25 NOTE — PROGRESS NOTES
Daily Note     Today's date: 2018  Patient name: Fanta Robison  : 1990  MRN: [id number]  Referring provider: Padmaja Suggs MD  Dx:   Encounter Diagnosis     ICD-10-CM    1  Pain of right clavicle M89 8X1        Start Time: 915  Stop Time: 1000  Total time in clinic (min): 45 minutes    Subjective: Pt reports feeling ok today, no new C/O pain  Pt states just stiffness in upper back         Objective: See treatment diary below    Precautions: none     Daily Treatment Diary      Manual                      Shoulder PROM                       Upper trap contract relax                        T/S [facility]                                                                         Exercise Diary        UBE       2'/2' 2'/2' 2'/2'  3'/3' 3'/3'  3'/3'     Pulleys  5'  5'  5'   5'  5'   5'   5'     Upper trap stretch  3 x 30" ea  3 x 30"  30"x3   3x30"  3x30"           Doorway stretch  3 x 30"  3 x 30" 30"x3   3x30"  3x30"    3x30" 3x30"     TB rows  OTB x 10 c 3" hold  OTB x 15 c 3" hold  OTB 3" x15    OTB 3" x15  BTB 20x3"    BTB 20x3"  Purple 20x3"     TB ext  OTB x 10 c 3" hold  OTB x 15 c 3" hold  OTB 3"x15    OTB 3" x15  BTB 20x3"    BTB 20x3"  Purple 20x3"     TB ER  NV   OTB x15  OTB  x15    OTB 3" x15  OTB 20x3"   OTB 20x3" Green 20x3"     Shoulder AROM (pain free, to 90 deg @ mirror) Scaption/ABD  NV  NV  3"  x10 ea   10x3"  15x ea           D1 flex/ext TB  YTB x 10 ea  OTB x 10  OTB x15    OTB x15  OTB 20x           D2 flex/ext TB  YTB x 10 ea OTB x 10  OTB x15    OTB x15  OTB 20x           Chin tuck c biofeedback  seated x 10 c 10" hold(progress NV)  hooklying x 10 to yellow, x 10 to purple  h/l  10" x10   @20  mmHg  10" x 10 @20, to purp/org ea 10" x 10 @20, to purp/org ea  10x10", 20 --> purple            TB IR              GTB  20  GTB 20x  GTB 20x      body blade              flex 2 x 30" flex 2 x 30" Flex 2x30"      scap stabilization c med ball 30" ea               Red 30' ea Red 30" ea     BTB strap               3x30" 3x30"      TB BTB raise              YTB  X 20  YTB  20x ORG 20x                                                                                                                                   Modalities   5/30 6/11                   CP PRN  10' post  10' post                                                                       6/25/18: Pt supervised by Krystle Hackett DPT from 9:15-9:30, unsupervised from 9:30-9:35, treated 1:1 remainder of session  Assessment: Tolerated treatment well  Patient demonstrated fatigue post treatment and would benefit from continued PT  Pt shows increased function and decreased symptoms post PT performed mobilizations  Pt will benefit from further skilled PT to increase strength, flexibility and function  Continue to progress as able  Plan: Progress treatment as tolerated  Continue scap stabilization and RTC strength

## 2018-06-28 ENCOUNTER — APPOINTMENT (OUTPATIENT)
Dept: PHYSICAL THERAPY | Facility: CLINIC | Age: 28
End: 2018-06-28
Payer: COMMERCIAL

## 2018-07-02 ENCOUNTER — OFFICE VISIT (OUTPATIENT)
Dept: PHYSICAL THERAPY | Facility: CLINIC | Age: 28
End: 2018-07-02
Payer: COMMERCIAL

## 2018-07-02 DIAGNOSIS — M89.8X1 PAIN OF RIGHT CLAVICLE: Primary | ICD-10-CM

## 2018-07-02 PROCEDURE — 97112 NEUROMUSCULAR REEDUCATION: CPT

## 2018-07-02 PROCEDURE — 97140 MANUAL THERAPY 1/> REGIONS: CPT

## 2018-07-02 PROCEDURE — 97110 THERAPEUTIC EXERCISES: CPT

## 2018-07-03 ENCOUNTER — APPOINTMENT (OUTPATIENT)
Dept: PHYSICAL THERAPY | Facility: CLINIC | Age: 28
End: 2018-07-03
Payer: COMMERCIAL

## 2018-07-06 ENCOUNTER — APPOINTMENT (EMERGENCY)
Dept: RADIOLOGY | Facility: HOSPITAL | Age: 28
End: 2018-07-06

## 2018-07-06 ENCOUNTER — HOSPITAL ENCOUNTER (EMERGENCY)
Facility: HOSPITAL | Age: 28
Discharge: HOME | End: 2018-07-06
Attending: EMERGENCY MEDICINE | Admitting: EMERGENCY MEDICINE

## 2018-07-06 VITALS
OXYGEN SATURATION: 99 % | TEMPERATURE: 97.7 F | HEART RATE: 87 BPM | WEIGHT: 211.2 LBS | DIASTOLIC BLOOD PRESSURE: 68 MMHG | SYSTOLIC BLOOD PRESSURE: 110 MMHG | BODY MASS INDEX: 35.15 KG/M2 | RESPIRATION RATE: 16 BRPM

## 2018-07-06 DIAGNOSIS — M79.644 PAIN OF FINGER OF RIGHT HAND: Primary | ICD-10-CM

## 2018-07-06 PROCEDURE — 99283 EMERGENCY DEPT VISIT LOW MDM: CPT

## 2018-07-06 PROCEDURE — 73140 X-RAY EXAM OF FINGER(S): CPT

## 2018-07-06 RX ORDER — NAPROXEN 500 MG/1
500 TABLET ORAL 2 TIMES DAILY WITH MEALS
Qty: 20 TABLET | Refills: 0 | Status: SHIPPED | OUTPATIENT
Start: 2018-07-06 | End: 2018-08-14 | Stop reason: ALTCHOICE

## 2018-07-06 NOTE — DISCHARGE INSTRUCTIONS
Arthralgia   WHAT YOU NEED TO KNOW:   Arthralgia is pain in one or more joints, with no inflammation  It may be short-term and get better within 6 to 8 weeks  Arthralgia can be an early sign of arthritis  Arthralgia may be caused by a medical condition, such as a hormone disorder or a tumor  It may also be caused by an infection or injury  DISCHARGE INSTRUCTIONS:   Medicines: The following medicines may  be ordered for you:  · Acetaminophen  decreases pain  Ask how much to take and how often to take it  Follow directions  Acetaminophen can cause liver damage if not taken correctly  · NSAIDs  decrease pain and prevent swelling  Ask your healthcare provider which medicine is right for you  Ask how much to take and when to take it  Take as directed  NSAIDs can cause stomach bleeding and kidney problems if not taken correctly  · Pain relief cream  decreases pain  Use this cream as directed  · Take your medicine as directed  Contact your healthcare provider if you think your medicine is not helping or if you have side effects  Tell him of her if you are allergic to any medicine  Keep a list of the medicines, vitamins, and herbs you take  Include the amounts, and when and why you take them  Bring the list or the pill bottles to follow-up visits  Carry your medicine list with you in case of an emergency  Follow up with your healthcare provider or specialist as directed:  Write down your questions so you remember to ask them during your visits  Self-care:   · Apply heat  to help decrease pain  Use a heating pad or heat wrap  Apply heat for 20 to 30 minutes every 2 hours for as many days as directed  · Rest  as much as possible  Avoid activities that cause joint pain  · Apply ice  to help decrease swelling and pain  Ice may also help prevent tissue damage  Use an ice pack, or put crushed ice in a plastic bag   Cover it with a towel and place it on your painful joint for 15 to 20 minutes every hour or as directed  · Support  the joint with a brace or elastic wrap as directed  · Elevate  your joint above the level of your heart as often as you can to help decrease swelling and pain  Prop your painful joint on pillows or blankets to keep it elevated comfortably  · Lose weight  if you are overweight  Extra weight can put pressure on your joints and cause more pain  Ask your healthcare provider how much you should weigh  Ask him to help you create a weight loss plan  · Exercise  regularly to help improve joint movement and to decrease pain  Ask about the best exercise plan for you  Low-impact exercises can help take the pressure off your joints  Examples are walking, swimming, and water aerobics  Physical therapy:  A physical therapist teaches you exercises to help improve movement and strength, and to decrease pain  Ask your healthcare provider if physical therapy is right for you  Contact your healthcare provider or specialist if:   · You have a fever  · You continue to have joint pain that cannot be relieved with heat, ice, or medicine  · You have pain and inflammation around your joint  · You have questions or concerns about your condition or care  Return to the emergency department if:   · You have sudden, severe pain when you move your joint  · You have a fever and shaking chills  · You cannot move your joint  · You lose feeling on the side of your body where you have the painful joint  © 2017 [employer]  Information is for End User's use only and may not be sold, redistributed or otherwise used for commercial purposes  All illustrations and images included in CareNotes® are the copyrighted property of A D A M , Inc  or Federico Gupta  The above information is an  only  It is not intended as medical advice for individual conditions or treatments   Talk to your doctor, nurse or pharmacist before following any medical regimen to see if it is safe and effective for you

## 2018-07-06 NOTE — ED PROVIDER NOTES
History  Chief Complaint   Patient presents with    Finger Swelling     x2 days to R 3rd and 4th finger  No known injury  Pt reports pain to fingers with most being to middle knucle of 3rd finger  82-year-old female right-hand dominant presents today for evaluation of finger pain x2 days  She describes pain over the PIP of the right middle digit  She denies any known trauma or injury  She states for the past 2 days her middle finger has appeared swollen  The pain is worse with the palpation and with range of motion  She can't describe the pain  She rates it a 10/10  She has been taking over-the-counter pain medications  She denies any paresthesias numbness or tingling  She denies any history of injury  She has no other complaints this time  Prior to Admission Medications   Prescriptions Last Dose Informant Patient Reported? Taking? cyclobenzaprine (FLEXERIL) 5 mg tablet   No No   Sig: Take 1 tablet by mouth 3 (three) times a day as needed for muscle spasms for up to 30 doses      Facility-Administered Medications: None       Past Medical History:   Diagnosis Date    Asthma        Past Surgical History:   Procedure Laterality Date    CYST REMOVAL      SHOULDER SURGERY      R shoulder       History reviewed  No pertinent family history  I have reviewed and agree with the history as documented  Social History   Substance Use Topics    Smoking status: Current Every Day Smoker     Packs/day: 1 00    Smokeless tobacco: Never Used    Alcohol use Yes      Comment: socailly        Review of Systems   Constitutional: Negative for chills and fever  Respiratory: Negative for shortness of breath  Cardiovascular: Negative for chest pain  Musculoskeletal: Positive for arthralgias and joint swelling  Skin: Negative for color change and wound  Neurological: Negative for weakness and numbness         Physical Exam  Physical Exam   Constitutional: She is oriented to person, place, and time  She appears well-developed and well-nourished  Non-toxic appearance  She does not have a sickly appearance  She does not appear ill  No distress  HENT:   Head: Normocephalic and atraumatic  Right Ear: External ear normal    Left Ear: External ear normal    Nose: Nose normal    Mouth/Throat: Oropharynx is clear and moist    Eyes: Conjunctivae and EOM are normal  Pupils are equal, round, and reactive to light  Neck: Normal range of motion  Neck supple  Cardiovascular: Normal rate, regular rhythm and normal heart sounds  Exam reveals no gallop and no friction rub  No murmur heard  Pulses:       Radial pulses are 2+ on the right side, and 2+ on the left side  Pulmonary/Chest: Effort normal and breath sounds normal  No respiratory distress  She has no decreased breath sounds  She has no wheezes  She has no rhonchi  She has no rales  Musculoskeletal:        Right wrist: Normal         Right hand: She exhibits decreased range of motion, tenderness, bony tenderness and swelling  She exhibits normal two-point discrimination, normal capillary refill, no deformity and no laceration  Normal sensation noted  Normal strength noted  Hands: There is mild swelling and tenderness at the PIP of the right middle digit  Remainder of hand is non-tender with palpation  Decreased AROM d/t pain  Distal sensation intact  Cap refill < 2 s  Radial pulses 2+  Neurological: She is alert and oriented to person, place, and time  Skin: Skin is warm and dry  Capillary refill takes less than 2 seconds  She is not diaphoretic  Psychiatric: She has a normal mood and affect  Nursing note and vitals reviewed        Vital Signs  ED Triage Vitals   Temperature Pulse Respirations Blood Pressure SpO2   07/06/18 1259 07/06/18 1259 07/06/18 1259 07/06/18 1300 07/06/18 1259   97 7 °F (36 5 °C) 87 16 110/68 99 %      Temp Source Heart Rate Source Patient Position - Orthostatic VS BP Location FiO2 (%)   07/06/18 1259 07/06/18 1259 07/06/18 1259 07/06/18 1259 --   Temporal Monitor Sitting Right arm       Pain Score       07/06/18 1259       No Pain           Vitals:    07/06/18 1259 07/06/18 1300   BP:  110/68   Pulse: 87    Patient Position - Orthostatic VS: Sitting        Visual Acuity      ED Medications  Medications - No data to display    Diagnostic Studies  Results Reviewed     None                 XR finger third digit-middle RIGHT   ED Interpretation by Sharon Leiva PA-C (07/06 1346)   No acute fracture appreciated                 Procedures  Procedures       Phone Contacts  ED Phone Contact    ED Course                               MDM  Number of Diagnoses or Management Options  Pain of finger of right hand: new and requires workup  Diagnosis management comments:   23 yo M who presents for evaluation of middle finger pain, no known trauma or injury  Mild swelling with tenderness at PIP  No s/s of infection  Xray of finger interpreted by me as negative for acute fracture  Digits were marleny taped  Patient was advised to ice, take naproxen for pain and f/u ortho  Patient verbalizes understanding and agrees with plan  Amount and/or Complexity of Data Reviewed  Tests in the radiology section of CPT®: ordered and reviewed  Independent visualization of images, tracings, or specimens: yes    Patient Progress  Patient progress: stable    CritCare Time    Disposition  Final diagnoses:   Pain of finger of right hand     Time reflects when diagnosis was documented in both MDM as applicable and the Disposition within this note     Time User Action Codes Description Comment    7/6/2018  1:47 PM Kamla Kim Add [D86 131] Pain of finger of right hand       ED Disposition     ED Disposition Condition Comment    Discharge  [address] discharge to home/self care      Condition at discharge: Good        Follow-up Information     Follow up With Specialties Details Why Contact Info Christen Gunderson MD Family Medicine Schedule an appointment as soon as possible for a visit  [address]  [address] [facility] [address] [facility]  [phone]       [address] Orthopedic Surgery  If symptoms worsen [facility] [id number] [address] [facility] Emergency Department Emergency Medicine  If symptoms worsen - additional trauma or injury [address] [facility] ED, [address] [facility] , [place], [place], [address]          Discharge Medication List as of 7/6/2018  1:47 PM      START taking these medications    Details   naproxen (EC NAPROSYN) 500 MG EC tablet Take 1 tablet (500 mg total) by mouth 2 (two) times a day with meals, Starting Fri 7/6/2018, Print         CONTINUE these medications which have NOT CHANGED    Details   cyclobenzaprine (FLEXERIL) 5 mg tablet Take 1 tablet by mouth 3 (three) times a day as needed for muscle spasms for up to 30 doses, Starting Tue 11/7/2017, Print           No discharge procedures on file      ED Provider  Electronically Signed by           Enzo Bolanos PA-C  07/06/18 8977

## 2018-07-09 ENCOUNTER — EVALUATION (OUTPATIENT)
Dept: PHYSICAL THERAPY | Facility: CLINIC | Age: 28
End: 2018-07-09
Payer: COMMERCIAL

## 2018-07-09 DIAGNOSIS — M89.8X1 PAIN OF RIGHT CLAVICLE: Primary | ICD-10-CM

## 2018-07-09 PROCEDURE — G8992 OTHER PT/OT  D/C STATUS: HCPCS | Performed by: PHYSICAL MEDICINE & REHABILITATION

## 2018-07-09 PROCEDURE — 97110 THERAPEUTIC EXERCISES: CPT | Performed by: PHYSICAL MEDICINE & REHABILITATION

## 2018-07-09 PROCEDURE — 97140 MANUAL THERAPY 1/> REGIONS: CPT | Performed by: PHYSICAL MEDICINE & REHABILITATION

## 2018-07-09 PROCEDURE — G8990 OTHER PT/OT CURRENT STATUS: HCPCS | Performed by: PHYSICAL MEDICINE & REHABILITATION

## 2018-07-09 PROCEDURE — G8991 OTHER PT/OT GOAL STATUS: HCPCS | Performed by: PHYSICAL MEDICINE & REHABILITATION

## 2018-07-09 NOTE — PROGRESS NOTES
PT Evaluation  and PT Discharge    Today's date: 2018  Patient name: Jarrett Molina  : 1990  MRN: [id number]  Referring provider: Krista Marlow MD  Dx:   Encounter Diagnosis     ICD-10-CM    1  Pain of right clavicle M89 8X1        Start Time: 1500  Stop Time: 1600  Total time in clinic (min): 60 minutes    Assessment  Impairments: impaired physical strength and lacks appropriate home exercise program    Assessment details: Izabel has been attending outpatient physical therapy with Pain of right clavicle  Since the last assessment, patient demonstrates decreased pain levels, improved range of motion, improved strength, and increased tolerance to activity  She continues to demonstrate deficits in strength and is confident in her ability to maintain gains achieved in PT and continue to improve strength and endurancewith D/C to HEP  Understanding of Dx/Px/POC: good   Prognosis: good    Goals  ST  Patient will report 25% decrease in pain in 4 weeks  MET  2  Patient will demonstrate 25% improvement in ROM in 4 weeks  MET  3  Patient will demonstrate 1/2 grade improvement in strength in 4 weeks  MET    LT  Patient will be able to perform IADLS without restriction or pain by discharge  MET  2  Patient will be independent in HEP by discharge  MET  3  Patient will be able to return to recreational/work duties without restriction or pain by discharge  Partially met      Plan  Patient would benefit from: skilled PT  Planned therapy interventions: home exercise program  Treatment plan discussed with: patient  Plan details: D/C to HEP        Subjective Evaluation    History of Present Illness  Mechanism of injury: Pt reports experiencing R shoulder pain for approximately 6 months  She reports increased pain with certain movements and states that she is able to tolerate the increase in pain  Pt reports weakness and that the R UE feels heavy     (18) Pt reports that she is overall at 80% functional ability  She states that some days she has no pain, but other times her arm feels heavy  Her chief complaint is stiffness in the neck and upper back, between the shoulder blades    (18) Pt reports that she feels that she is at approximately 80% of full functional ability  Her chief complaint is weakness and a "dead" feeling in the R arm with increased activity  Pain  Current pain ratin  At best pain ratin  At worst pain ratin  Location: Posterior shoulder  Quality: discomfort and sharp  Relieving factors: rest  Aggravating factors: overhead activity and lifting  Progression: improved      Diagnostic Tests  X-ray: normal  Patient Goals  Patient goals for therapy: increased strength, decreased pain and increased motion          Objective     Static Posture     Head  Forward  Shoulders  Rounded  Palpation     Right Tenderness of the upper trapezius  Tenderness     Right Shoulder  Tenderness in the [facility] joint and clavicle  No tenderness in the coracoid process and scapular spine       Cervical/Thoracic Screen   Cervical range of motion within normal limits with the following exceptions: FB 40    (18)     60  BB  40                     40  RSB 35                   45  LSB 30                    45    Active Range of Motion   Cervical/Thoracic Spine   Cervical    Flexion: 40 degrees with pain  Extension: 45 degrees   Left lateral flexion: 45 degrees   Right lateral flexion: 35 degrees with pain  Left rotation: 75 degrees   Right rotation: 75 degrees   Left Shoulder   Flexion: 165 degrees   Abduction: 170 degrees   External rotation BTH: T6   Internal rotation BTB: T6     Right Shoulder   Flexion: 165 degrees   Abduction: 160 degrees   External rotation BTH: T4   Internal rotation BTB: T6     Passive Range of Motion     Right Shoulder   Flexion: 170 degrees   Abduction: 180 degrees   External rotation 90°: 90 degrees   Internal rotation 90°: 90 degrees     Strength/Myotome Testing     Left Shoulder     Planes of Motion   Flexion: 5   Extension: 5   Abduction: 5   Adduction: 5   External rotation at 0°: 5   Internal rotation at 0°: 5     Isolated Muscles   Lower trapezius: 4   Middle trapezius: 4     Right Shoulder     Planes of Motion   Flexion: 4+   Abduction: 4+   External rotation at 45°: 5   Internal rotation at 0°: 5     Isolated Muscles   Lower trapezius: 4-   Middle trapezius: 4-     Tests     Right Shoulder   Negative active compression ([place]) and scapular assistance          Flowsheet Rows      Most Recent Value   PT/OT G-Codes   Current Score  61   Projected Score  72   Assessment Type  Discharge   G code set  Other PT/OT Primary   Other PT Primary Current Status ()  CJ   Other PT Primary Goal Status ()  CJ   Other PT Primary Discharge Status ()  CJ          Precautions: none     Daily Treatment Diary

## 2018-07-11 ENCOUNTER — APPOINTMENT (OUTPATIENT)
Dept: PHYSICAL THERAPY | Facility: CLINIC | Age: 28
End: 2018-07-11
Payer: COMMERCIAL

## 2018-07-12 ENCOUNTER — APPOINTMENT (OUTPATIENT)
Dept: PHYSICAL THERAPY | Facility: CLINIC | Age: 28
End: 2018-07-12
Payer: COMMERCIAL

## 2018-08-14 ENCOUNTER — OFFICE VISIT (OUTPATIENT)
Dept: OBGYN CLINIC | Facility: MEDICAL CENTER | Age: 28
End: 2018-08-14

## 2018-08-14 VITALS
DIASTOLIC BLOOD PRESSURE: 77 MMHG | WEIGHT: 210 LBS | HEART RATE: 73 BPM | SYSTOLIC BLOOD PRESSURE: 111 MMHG | BODY MASS INDEX: 34.99 KG/M2 | HEIGHT: 65 IN

## 2018-08-14 DIAGNOSIS — S42.031D CLOSED DISPLACED FRACTURE OF ACROMIAL END OF RIGHT CLAVICLE WITH ROUTINE HEALING: Primary | ICD-10-CM

## 2018-08-14 PROCEDURE — 99213 OFFICE O/P EST LOW 20 MIN: CPT | Performed by: ORTHOPAEDIC SURGERY

## 2018-08-14 NOTE — LETTER
August 14, 2018     Patient: Ishmael Kern   YOB: 1990   Date of Visit: 8/14/2018       To Whom it May Concern:    Ishmael Kern is under my professional care  She was seen in my office on 8/14/2018  She may return to work full duty without restriction at this time  If you have any questions or concerns, please don't hesitate to call           Sincerely,          Ramona Neely MD        CC: Ishmael Kern

## 2018-08-14 NOTE — PROGRESS NOTES
Orthopaedic Surgery - Office Note  Gamaliel Kumar (24 y o  female)   : 1990   MRN: [id number]  Encounter Date: 2018    Chief Complaint   Patient presents with    Right Shoulder - Follow-up       Assessment / Plan  S/p ORIF right distal clavicle fx, with hardware pain    · Continue physical therapy on her own for scapular strengthening, shoulder and neck exercises  · Continue with ice, Anti-inflammatories or Tylenol prn pain   · We did have a discussion about possibly removing the hardware as an option due to her pain around the site of hardware  She would like to think about this as an option and see how she progresses in the future  · Patient was provided with a note she is allowed to return to work fully at this time  Return if symptoms worsen or fail to improve  History of Present Illness  Izabel Blunt is a 24 y o  female who presents for follow up of ORIF right distal clavicle fracture in 2015  The patient has last been seen in 2000 18 where she was prescribed physical therapy for cervical and shoulder strengthening due to continued discomfort 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  She feels that she progressed well since doing the physical therapy and completed it up to about a month and a half ago  At this time she feels that she can go back to work and do her job without restriction  She does still have some tenderness over the [facility] joint with certain movements and some tenderness over the screws with certain movements or touch  Review of Systems  Pertinent items are noted in HPI  All other systems were reviewed and are negative  Physical Exam  /77   Pulse 73   Ht 5' 5" (1 651 m)   Wt 95 3 kg (210 lb)   BMI 34 95 kg/m²   Cons: Appears well  No apparent distress  Psych: Alert  Oriented x3  Mood and affect normal   Eyes: PERRLA, EOMI  Resp: Normal effort  No audible wheezing or stridor  CV: Palpable pulse  No discernable arrhythmia    No LE edema  Lymph:  No palpable cervical, axillary, or inguinal lymphadenopathy  Skin: Warm  No palpable masses  No visible lesions  Neuro: Normal muscle tone  Normal and symmetric DTR's  Right Shoulder Exam  Alignment / Posture:  mild scapular protraction  Inspection:  Incision healed  Palpation:  mild AC tenderness  Patient also has mild tenderness over the proximal screws  ROM:  Normal shoulder ROM  Strength:  5/5 supraspinatus, infraspinatus, and subscapularis  Stability:  No objective shoulder instability  Tests: (-) Valery Lyme  (-) Belly press  (-) Speed  (-) Tripp  (-) Cross-body adduction  Neurovascular:  Sensation intact in Ax/R/M/U nerve distributions  2+ radial pulse  Studies Reviewed  I have personally reviewed pertinent films in PACS  XR of right shoulder - healed distal clavicle fracture with anatomic alignment, appropriate alignment of AC joint wiht normal CC interval    Procedures  No procedures today  Medical, Surgical, Family, and Social History  The patient's medical history, family history, and social history, were reviewed and updated as appropriate  Past Medical History:   Diagnosis Date    Asthma        Past Surgical History:   Procedure Laterality Date    CYST REMOVAL      SHOULDER SURGERY      R shoulder       History reviewed  No pertinent family history  Social History     Occupational History    Not on file       Social History Main Topics    Smoking status: Current Every Day Smoker     Packs/day: 1 00    Smokeless tobacco: Never Used    Alcohol use Yes      Comment: socailly    Drug use: No    Sexual activity: Not on file       No Known Allergies      Current Outpatient Prescriptions:     cyclobenzaprine (FLEXERIL) 5 mg tablet, Take 1 tablet by mouth 3 (three) times a day as needed for muscle spasms for up to 30 doses (Patient not taking: Reported on 8/14/2018 ), Disp: 30 tablet, Rfl: 0      LIV Arguelles Attestation    I,:    am acting as a scribe while in the presence of the attending physician :        I,:    personally performed the services described in this documentation    as scribed in my presence :

## 2018-08-20 ENCOUNTER — HOSPITAL ENCOUNTER (INPATIENT)
Facility: HOSPITAL | Age: 28
LOS: 1 days | Discharge: HOME HEALTH | DRG: 552 | End: 2018-08-23
Attending: EMERGENCY MEDICINE | Admitting: HOSPITALIST

## 2018-08-20 ENCOUNTER — APPOINTMENT (EMERGENCY)
Dept: RADIOLOGY | Facility: HOSPITAL | Age: 28
DRG: 552 | End: 2018-08-20

## 2018-08-20 DIAGNOSIS — M54.40 ACUTE BILATERAL LOW BACK PAIN WITH SCIATICA, SCIATICA LATERALITY UNSPECIFIED: ICD-10-CM

## 2018-08-20 DIAGNOSIS — M54.50 ACUTE BILATERAL LOW BACK PAIN WITHOUT SCIATICA: Primary | ICD-10-CM

## 2018-08-20 DIAGNOSIS — Z72.0 TOBACCO USE: ICD-10-CM

## 2018-08-20 PROBLEM — M54.9 BACK PAIN: Status: ACTIVE | Noted: 2018-08-20

## 2018-08-20 LAB
ANION GAP SERPL CALCULATED.3IONS-SCNC: 7 MMOL/L (ref 4–13)
BASOPHILS # BLD AUTO: 0.04 THOUSANDS/ΜL (ref 0–0.1)
BASOPHILS NFR BLD AUTO: 0 % (ref 0–1)
BUN SERPL-MCNC: 8 MG/DL (ref 5–25)
CALCIUM SERPL-MCNC: 9.5 MG/DL (ref 8.3–10.1)
CHLORIDE SERPL-SCNC: 104 MMOL/L (ref 100–108)
CO2 SERPL-SCNC: 26 MMOL/L (ref 21–32)
CREAT SERPL-MCNC: 0.83 MG/DL (ref 0.6–1.3)
EOSINOPHIL # BLD AUTO: 0.01 THOUSAND/ΜL (ref 0–0.61)
EOSINOPHIL NFR BLD AUTO: 0 % (ref 0–6)
ERYTHROCYTE [DISTWIDTH] IN BLOOD BY AUTOMATED COUNT: 14.8 % (ref 11.6–15.1)
GFR SERPL CREATININE-BSD FRML MDRD: 98 ML/MIN/1.73SQ M
GLUCOSE SERPL-MCNC: 96 MG/DL (ref 65–140)
HCT VFR BLD AUTO: 40.3 % (ref 34.8–46.1)
HGB BLD-MCNC: 13.2 G/DL (ref 11.5–15.4)
LYMPHOCYTES # BLD AUTO: 1.71 THOUSANDS/ΜL (ref 0.6–4.47)
LYMPHOCYTES NFR BLD AUTO: 14 % (ref 14–44)
MCH RBC QN AUTO: 27.2 PG (ref 26.8–34.3)
MCHC RBC AUTO-ENTMCNC: 32.8 G/DL (ref 31.4–37.4)
MCV RBC AUTO: 83 FL (ref 82–98)
MONOCYTES # BLD AUTO: 0.62 THOUSAND/ΜL (ref 0.17–1.22)
MONOCYTES NFR BLD AUTO: 5 % (ref 4–12)
NEUTROPHILS # BLD AUTO: 9.96 THOUSANDS/ΜL (ref 1.85–7.62)
NEUTS SEG NFR BLD AUTO: 81 % (ref 43–75)
NRBC BLD AUTO-RTO: 0 /100 WBCS
PLATELET # BLD AUTO: 405 THOUSANDS/UL (ref 149–390)
PMV BLD AUTO: 9.9 FL (ref 8.9–12.7)
POTASSIUM SERPL-SCNC: 4.4 MMOL/L (ref 3.5–5.3)
RBC # BLD AUTO: 4.86 MILLION/UL (ref 3.81–5.12)
SODIUM SERPL-SCNC: 137 MMOL/L (ref 136–145)
WBC # BLD AUTO: 12.34 THOUSAND/UL (ref 4.31–10.16)

## 2018-08-20 PROCEDURE — 80048 BASIC METABOLIC PNL TOTAL CA: CPT | Performed by: PHYSICIAN ASSISTANT

## 2018-08-20 PROCEDURE — 96374 THER/PROPH/DIAG INJ IV PUSH: CPT

## 2018-08-20 PROCEDURE — 72100 X-RAY EXAM L-S SPINE 2/3 VWS: CPT

## 2018-08-20 PROCEDURE — 36415 COLL VENOUS BLD VENIPUNCTURE: CPT | Performed by: PHYSICIAN ASSISTANT

## 2018-08-20 PROCEDURE — 85025 COMPLETE CBC W/AUTO DIFF WBC: CPT | Performed by: PHYSICIAN ASSISTANT

## 2018-08-20 PROCEDURE — 99220 PR INITIAL OBSERVATION CARE/DAY 70 MINUTES: CPT | Performed by: PHYSICIAN ASSISTANT

## 2018-08-20 PROCEDURE — 99285 EMERGENCY DEPT VISIT HI MDM: CPT

## 2018-08-20 PROCEDURE — 96372 THER/PROPH/DIAG INJ SC/IM: CPT

## 2018-08-20 RX ORDER — CYCLOBENZAPRINE HCL 10 MG
10 TABLET ORAL 3 TIMES DAILY PRN
Status: DISCONTINUED | OUTPATIENT
Start: 2018-08-20 | End: 2018-08-20

## 2018-08-20 RX ORDER — LIDOCAINE 50 MG/G
1 PATCH TOPICAL ONCE
Status: COMPLETED | OUTPATIENT
Start: 2018-08-20 | End: 2018-08-21

## 2018-08-20 RX ORDER — MORPHINE SULFATE 4 MG/ML
6 INJECTION, SOLUTION INTRAMUSCULAR; INTRAVENOUS ONCE
Status: COMPLETED | OUTPATIENT
Start: 2018-08-20 | End: 2018-08-20

## 2018-08-20 RX ORDER — OXYCODONE HYDROCHLORIDE 5 MG/1
5 TABLET ORAL EVERY 4 HOURS PRN
Status: DISCONTINUED | OUTPATIENT
Start: 2018-08-20 | End: 2018-08-21

## 2018-08-20 RX ORDER — CYCLOBENZAPRINE HCL 10 MG
10 TABLET ORAL ONCE
Status: COMPLETED | OUTPATIENT
Start: 2018-08-20 | End: 2018-08-20

## 2018-08-20 RX ORDER — TRAMADOL HYDROCHLORIDE 50 MG/1
50 TABLET ORAL ONCE
Status: COMPLETED | OUTPATIENT
Start: 2018-08-20 | End: 2018-08-20

## 2018-08-20 RX ORDER — LIDOCAINE 50 MG/G
1 PATCH TOPICAL DAILY
Status: DISCONTINUED | OUTPATIENT
Start: 2018-08-21 | End: 2018-08-23 | Stop reason: HOSPADM

## 2018-08-20 RX ORDER — NICOTINE 21 MG/24HR
1 PATCH, TRANSDERMAL 24 HOURS TRANSDERMAL DAILY
Status: DISCONTINUED | OUTPATIENT
Start: 2018-08-21 | End: 2018-08-20 | Stop reason: SDUPTHER

## 2018-08-20 RX ORDER — DIAZEPAM 5 MG/ML
5 INJECTION, SOLUTION INTRAMUSCULAR; INTRAVENOUS EVERY 6 HOURS PRN
Status: DISCONTINUED | OUTPATIENT
Start: 2018-08-20 | End: 2018-08-23 | Stop reason: HOSPADM

## 2018-08-20 RX ORDER — NICOTINE 21 MG/24HR
14 PATCH, TRANSDERMAL 24 HOURS TRANSDERMAL DAILY
Status: DISCONTINUED | OUTPATIENT
Start: 2018-08-20 | End: 2018-08-23 | Stop reason: HOSPADM

## 2018-08-20 RX ORDER — ONDANSETRON 2 MG/ML
4 INJECTION INTRAMUSCULAR; INTRAVENOUS EVERY 6 HOURS PRN
Status: DISCONTINUED | OUTPATIENT
Start: 2018-08-20 | End: 2018-08-23 | Stop reason: HOSPADM

## 2018-08-20 RX ORDER — ACETAMINOPHEN 325 MG/1
650 TABLET ORAL EVERY 6 HOURS PRN
Status: DISCONTINUED | OUTPATIENT
Start: 2018-08-20 | End: 2018-08-23 | Stop reason: HOSPADM

## 2018-08-20 RX ORDER — ACETAMINOPHEN 325 MG/1
975 TABLET ORAL ONCE
Status: COMPLETED | OUTPATIENT
Start: 2018-08-20 | End: 2018-08-20

## 2018-08-20 RX ORDER — MORPHINE SULFATE 2 MG/ML
2 INJECTION, SOLUTION INTRAMUSCULAR; INTRAVENOUS EVERY 4 HOURS PRN
Status: DISCONTINUED | OUTPATIENT
Start: 2018-08-20 | End: 2018-08-21

## 2018-08-20 RX ORDER — KETOROLAC TROMETHAMINE 30 MG/ML
30 INJECTION, SOLUTION INTRAMUSCULAR; INTRAVENOUS ONCE
Status: COMPLETED | OUTPATIENT
Start: 2018-08-20 | End: 2018-08-20

## 2018-08-20 RX ORDER — MAGNESIUM HYDROXIDE/ALUMINUM HYDROXICE/SIMETHICONE 120; 1200; 1200 MG/30ML; MG/30ML; MG/30ML
30 SUSPENSION ORAL EVERY 6 HOURS PRN
Status: DISCONTINUED | OUTPATIENT
Start: 2018-08-20 | End: 2018-08-23 | Stop reason: HOSPADM

## 2018-08-20 RX ADMIN — NICOTINE 14 MG: 14 PATCH, EXTENDED RELEASE TRANSDERMAL at 21:17

## 2018-08-20 RX ADMIN — MORPHINE SULFATE 6 MG: 4 INJECTION, SOLUTION INTRAMUSCULAR; INTRAVENOUS at 19:43

## 2018-08-20 RX ADMIN — KETOROLAC TROMETHAMINE 30 MG: 30 INJECTION, SOLUTION INTRAMUSCULAR at 16:11

## 2018-08-20 RX ADMIN — OXYCODONE HYDROCHLORIDE 5 MG: 5 TABLET ORAL at 22:00

## 2018-08-20 RX ADMIN — LIDOCAINE 1 PATCH: 50 PATCH TOPICAL at 16:09

## 2018-08-20 RX ADMIN — CYCLOBENZAPRINE HYDROCHLORIDE 10 MG: 10 TABLET, FILM COATED ORAL at 17:31

## 2018-08-20 RX ADMIN — ACETAMINOPHEN 975 MG: 325 TABLET, FILM COATED ORAL at 16:13

## 2018-08-20 RX ADMIN — TRAMADOL HYDROCHLORIDE 50 MG: 50 TABLET, FILM COATED ORAL at 17:55

## 2018-08-20 NOTE — ED NOTES
Patient assisted onto bedpan with help of LYNDA Guzmán  Patient settled down  Has call bell to call when she is finished       Joel Truong, ADELSO  08/20/18 3929

## 2018-08-20 NOTE — ED NOTES
Patient reports no pain relief from all medications administered  Abdominal binder placed on patient to help with compression and assist with movement, patient still unable to sit up to attempt to ambulate       Paradise Bashir RN  08/20/18 5763

## 2018-08-20 NOTE — ED NOTES
Patient allowed to eat per MARTIN Gonzalez PA-C, mother going to pick something up for patient        Laz Mojica RN  08/20/18 7318

## 2018-08-20 NOTE — ED PROVIDER NOTES
History  Chief Complaint   Patient presents with    Back Pain     Patient was putting something in the trunk of her car this morning and felt something pull, has had back pain since; reports no relief with Tylenol; denies urinary complaints  History provided by:  Patient  Back Pain   Location:  Lumbar spine  Quality:  Shooting and aching  Radiates to:  R thigh and L thigh  Pain severity:  Severe  Pain is:  Same all the time  Onset quality:  Gradual  Duration:  8 hours  Timing:  Constant  Progression:  Worsening  Chronicity:  New  Context: physical stress and twisting    Relieved by:  Nothing  Worsened by: Movement  Associated symptoms: paresthesias (in bilateral anterior thighs)    Associated symptoms: no abdominal pain, no abdominal swelling, no bladder incontinence, no bowel incontinence, no chest pain, no dysuria, no fever, no headaches, no leg pain, no numbness, no pelvic pain, no perianal numbness, no tingling, no weakness and no weight loss        None       Past Medical History:   Diagnosis Date    Asthma        Past Surgical History:   Procedure Laterality Date    CYST REMOVAL      SHOULDER SURGERY      R shoulder       History reviewed  No pertinent family history  I have reviewed and agree with the history as documented  Social History   Substance Use Topics    Smoking status: Current Every Day Smoker     Packs/day: 1 00    Smokeless tobacco: Never Used    Alcohol use Yes      Comment: socailly        Review of Systems   Constitutional: Negative for activity change, chills, fatigue, fever and weight loss  HENT: Negative for congestion, ear pain, mouth sores, sore throat and trouble swallowing  Eyes: Negative for photophobia and visual disturbance  Respiratory: Negative for chest tightness, shortness of breath and wheezing  Cardiovascular: Negative for chest pain and palpitations     Gastrointestinal: Negative for abdominal pain, bowel incontinence, constipation, diarrhea, nausea and vomiting  Genitourinary: Negative for bladder incontinence, decreased urine volume, difficulty urinating, dysuria, genital sores, hematuria and pelvic pain  Musculoskeletal: Positive for back pain and gait problem  Negative for arthralgias, myalgias, neck pain and neck stiffness  Skin: Negative for rash and wound  Neurological: Positive for paresthesias (in bilateral anterior thighs)  Negative for dizziness, tingling, syncope, weakness, light-headedness, numbness and headaches  Hematological: Negative for adenopathy  All other systems reviewed and are negative  Physical Exam  Physical Exam   Constitutional: She is oriented to person, place, and time  She appears well-developed and well-nourished  No distress  HENT:   Head: Normocephalic and atraumatic  Right Ear: External ear normal    Left Ear: External ear normal    Nose: Nose normal    Mouth/Throat: Oropharynx is clear and moist    Eyes: Conjunctivae and EOM are normal  Pupils are equal, round, and reactive to light  No scleral icterus  Neck: Normal range of motion  Neck supple  Cardiovascular: Normal rate, regular rhythm, normal heart sounds and intact distal pulses  Exam reveals no gallop and no friction rub  No murmur heard  Pulmonary/Chest: Effort normal and breath sounds normal  No respiratory distress  She has no wheezes  Abdominal: Soft  She exhibits no distension  There is no tenderness  There is no guarding  Musculoskeletal: She exhibits tenderness  She exhibits no edema or deformity  Right hip: Normal         Left hip: She exhibits normal range of motion  Right knee: Normal         Left knee: Normal         Thoracic back: Normal         Lumbar back: She exhibits decreased range of motion, tenderness, bony tenderness and spasm  She exhibits no swelling, no edema and no laceration          Back:         Right upper leg: Normal         Left upper leg: Normal    Patient with tenderness to palpation of L5-S1 area  Surrounding muscle spasm noted  Patient with bilateral negative straight leg raise  Patient does have pain with attempting to flex the hip in her lower back  No pain radiating to feet  Lymphadenopathy:     She has no cervical adenopathy  Neurological: She is alert and oriented to person, place, and time  She displays normal reflexes  No cranial nerve deficit or sensory deficit  She exhibits normal muscle tone  Skin: Skin is warm and dry  Capillary refill takes less than 2 seconds  No rash noted  She is not diaphoretic  No erythema  Nursing note and vitals reviewed        Vital Signs  ED Triage Vitals [08/20/18 1508]   Temperature Pulse Respirations Blood Pressure SpO2   98 2 °F (36 8 °C) 87 18 118/77 100 %      Temp Source Heart Rate Source Patient Position - Orthostatic VS BP Location FiO2 (%)   Oral Monitor Lying Right arm --      Pain Score       Worst Possible Pain           Vitals:    08/20/18 2054 08/20/18 2358 08/21/18 0717 08/21/18 1453   BP: 121/72 113/69 121/72 105/59   Pulse: 69 66 77 76   Patient Position - Orthostatic VS: Lying  Lying Lying       Visual Acuity      ED Medications  Medications   ondansetron (ZOFRAN) injection 4 mg (not administered)   aluminum-magnesium hydroxide-simethicone (MYLANTA) 200-200-20 mg/5 mL oral suspension 30 mL (not administered)   enoxaparin (LOVENOX) subcutaneous injection 40 mg (40 mg Subcutaneous Given 8/21/18 0906)   nicotine (NICODERM CQ) 14 mg/24hr TD 24 hr patch 14 mg (14 mg Transdermal Medication Applied 8/21/18 0907)   acetaminophen (TYLENOL) tablet 650 mg (not administered)   lidocaine (LIDODERM) 5 % patch 1 patch (1 patch Transdermal Medication Applied 8/21/18 0904)   diazepam (VALIUM) injection 5 mg (5 mg Intravenous Given 8/21/18 0005)   naproxen (NAPROSYN) tablet 500 mg (500 mg Oral Given 8/21/18 0904)   methylprednisolone (MEDROL) tablet 24 mg (24 mg Oral Given 8/21/18 1059)     Followed by   methylprednisolone (MEDROL) tablet 20 mg (not administered)     Followed by   methylPREDNISolone (MEDROL) tablet 16 mg (not administered)     Followed by   methylprednisolone (MEDROL) tablet 12 mg (not administered)     Followed by   methylprednisolone (MEDROL) tablet 8 mg (not administered)     Followed by   methylprednisolone (MEDROL) tablet 4 mg (not administered)   ketorolac (TORADOL) injection 15 mg (15 mg Intravenous Given 8/21/18 1356)   ketorolac (TORADOL) injection 30 mg (30 mg Intramuscular Given 8/20/18 1611)   acetaminophen (TYLENOL) tablet 975 mg (975 mg Oral Given 8/20/18 1613)   lidocaine (LIDODERM) 5 % patch 1 patch (1 patch Transdermal Patch Removed 8/21/18 0349)   cyclobenzaprine (FLEXERIL) tablet 10 mg (10 mg Oral Given 8/20/18 1731)   traMADol (ULTRAM) tablet 50 mg (50 mg Oral Given 8/20/18 1755)   morphine (PF) 4 mg/mL injection 6 mg (6 mg Intravenous Given 8/20/18 1943)       Diagnostic Studies  Results Reviewed     Procedure Component Value Units Date/Time    Basic metabolic panel [[id number]] Collected:  08/20/18 1942    Lab Status:  Final result Specimen:  Blood from Arm, Right Updated:  08/20/18 2008     Sodium 137 mmol/L      Potassium 4 4 mmol/L      Chloride 104 mmol/L      CO2 26 mmol/L      Anion Gap 7 mmol/L      BUN 8 mg/dL      Creatinine 0 83 mg/dL      Glucose 96 mg/dL      Calcium 9 5 mg/dL      eGFR 98 ml/min/1 73sq m     Narrative:         National Kidney Disease Education Program recommendations are as follows:  GFR calculation is accurate only with a steady state creatinine  Chronic Kidney disease less than 60 ml/min/1 73 sq  meters  Kidney failure less than 15 ml/min/1 73 sq  meters      CBC and differential [[id number]]  (Abnormal) Collected:  08/20/18 1942    Lab Status:  Final result Specimen:  Blood from Arm, Right Updated:  08/20/18 1958     WBC 12 34 (H) Thousand/uL      RBC 4 86 Million/uL      Hemoglobin 13 2 g/dL      Hematocrit 40 3 %      MCV 83 fL      MCH 27 2 pg      MCHC 32 8 g/dL      RDW 14 8 %      MPV 9 9 fL      Platelets 039 (H) Thousands/uL      nRBC 0 /100 WBCs      Neutrophils Relative 81 (H) %      Lymphocytes Relative 14 %      Monocytes Relative 5 %      Eosinophils Relative 0 %      Basophils Relative 0 %      Neutrophils Absolute 9 96 (H) Thousands/µL      Lymphocytes Absolute 1 71 Thousands/µL      Monocytes Absolute 0 62 Thousand/µL      Eosinophils Absolute 0 01 Thousand/µL      Basophils Absolute 0 04 Thousands/µL                  XR spine lumbar 2 or 3 views injury   Final Result by Dawood Diana MD (08/20 1646)      Normal examination  Workstation performed: [id number]         MRI lumbar spine wo contrast    (Results Pending)              Procedures  Procedures       Phone Contacts  ED Phone Contact    ED Course  ED Course as of Aug 21 1600   Mon Aug 20, 2018   1645 PVR 48ml    1849 After toradol, tylenol, lidoderm, flexerill, tramadol there is no improvement in pain  Patient unable to get off of bed and bear weight without being in tears  At this time I will admit for intractable pain and pain will likely need IP MRI  No emergent symptoms to necessitate ER order  Nancy Goel PA-C with [facility] returned call who states that they do not typically admit back pain patients who are young and otherwise healthy  She will discuss with attending  Patient unable to ambulate in ED  MDM  Number of Diagnoses or Management Options  Acute bilateral low back pain without sciatica: new and requires workup  Diagnosis management comments: ddx to include but not limited to: lumbar muscle strain, HNP, cord impingement    Patient is a 29-year-old female with no significant past medical history presents to the emergency department via ambulance for evaluation of back pain  Patient states that about 6 hours prior to arrival she was putting something into the trunk of her car when she felt a sharp pain in her back that went to her thighs after she strained upper bending over    She states that she then went to the car drove over to her mother's and tried to lay down in nap off the pain  She states upon waking up she had worsening of the pain and they called ambulance to bring patient to the emergency department  Upon arrival patient was yelling will care days at nursing staff that she needs to be seen by a doctor immediately  Patient was ultimately pacified by nursing supervisor Juan C Mcgoverndanielle  Patient states that she has not had any drop attacks her weakness in bilateral lower extremities just a lot of pain that her hurts with standing prolonged periods of time  No bladder or bowel incontinence  No urinary retention  No pain that radiates to the feet  No pain in the saddle region  No saddle paresthesia  On exam patient is in no acute distress  She is limited active range of motion of bilateral lower extremitis with flexion secondary to low back pain  Negative straight leg raise bilaterally  Full sensation to dull and sharp pinprick in bilateral lower extremities  No perianal paresthesias to sharp pinprick  PVR tested and was 48 ml  No saddle paresthesias  Plan will be to get lumbar XR  Will treat pain in ED with tylenol, flexeril, lidoderm, toradol  Will reserve narcotics for now   I have no suspicious for emergent cord impingment with normal neuro exam          Amount and/or Complexity of Data Reviewed  Clinical lab tests: ordered and reviewed  Tests in the radiology section of CPT®: ordered and reviewed    Risk of Complications, Morbidity, and/or Mortality  Presenting problems: low  Diagnostic procedures: low  Management options: moderate    Patient Progress  Patient progress: stable    CritCare Time    Disposition  Final diagnoses:   Acute bilateral low back pain without sciatica     Time reflects when diagnosis was documented in both MDM as applicable and the Disposition within this note     Time User Action Codes Description Comment    8/20/2018  6:03 PM Vivien De La Garza Add [M54 5] Acute bilateral low back pain without sciatica       ED Disposition     ED Disposition Condition Comment    Admit  Case was discussed with Kayden Middleton and the patient's admission status was agreed to be Admission Status: observation status to the service of Dr Tia Burrell   Follow-up Information    None         There are no discharge medications for this patient  Outpatient Discharge Orders  Ambulatory Referral to [facility]   Standing Status: Future  Standing Exp   Date: 02/20/19         ED Provider  Electronically Signed by           Huan Krause PA-C  08/21/18 1600

## 2018-08-20 NOTE — ED NOTES
Patient's visitor came out to nursing station to let me know patient has to use the restroom; I entered room and asked patient how she would like me to help her do this, if a bedpan or a wheelchair to the restroom would be easiest  Patient reluctant to answer, tearful  Patient then became very loud yelling at me to 'get her a doctor now' because she is in so much pain and needs a doctor now  I explained that they are in patient's rooms and I cannot pull them out of the room  Patient continued getting louder and demanding I get her a doctor, asking to see the 'manager or owner of the hospital ' LYNDA Guzmán, unit manager made aware of request and is currently at bedside speaking with patient        Khadra Fuentes RN  08/20/18 4681

## 2018-08-21 ENCOUNTER — APPOINTMENT (OUTPATIENT)
Dept: MRI IMAGING | Facility: HOSPITAL | Age: 28
DRG: 552 | End: 2018-08-21

## 2018-08-21 ENCOUNTER — DOCUMENTATION (OUTPATIENT)
Dept: PHYSICAL THERAPY | Facility: OTHER | Age: 28
End: 2018-08-21

## 2018-08-21 PROCEDURE — 99225 PR SBSQ OBSERVATION CARE/DAY 25 MINUTES: CPT | Performed by: HOSPITALIST

## 2018-08-21 PROCEDURE — 72148 MRI LUMBAR SPINE W/O DYE: CPT

## 2018-08-21 PROCEDURE — G8988 SELF CARE GOAL STATUS: HCPCS

## 2018-08-21 PROCEDURE — 97163 PT EVAL HIGH COMPLEX 45 MIN: CPT

## 2018-08-21 PROCEDURE — 97167 OT EVAL HIGH COMPLEX 60 MIN: CPT

## 2018-08-21 PROCEDURE — G8978 MOBILITY CURRENT STATUS: HCPCS

## 2018-08-21 PROCEDURE — G8979 MOBILITY GOAL STATUS: HCPCS

## 2018-08-21 PROCEDURE — 99253 IP/OBS CNSLTJ NEW/EST LOW 45: CPT | Performed by: PHYSICIAN ASSISTANT

## 2018-08-21 PROCEDURE — G8987 SELF CARE CURRENT STATUS: HCPCS

## 2018-08-21 RX ORDER — METHYLPREDNISOLONE 4 MG/1
8 TABLET ORAL DAILY
Status: DISCONTINUED | OUTPATIENT
Start: 2018-08-25 | End: 2018-08-23 | Stop reason: HOSPADM

## 2018-08-21 RX ORDER — METHYLPREDNISOLONE 4 MG/1
4 TABLET ORAL DAILY
Status: DISCONTINUED | OUTPATIENT
Start: 2018-08-26 | End: 2018-08-23 | Stop reason: HOSPADM

## 2018-08-21 RX ORDER — METHYLPREDNISOLONE 4 MG/1
12 TABLET ORAL DAILY
Status: DISCONTINUED | OUTPATIENT
Start: 2018-08-24 | End: 2018-08-23 | Stop reason: HOSPADM

## 2018-08-21 RX ORDER — NAPROXEN 500 MG/1
500 TABLET ORAL EVERY 12 HOURS SCHEDULED
Status: DISCONTINUED | OUTPATIENT
Start: 2018-08-21 | End: 2018-08-23 | Stop reason: HOSPADM

## 2018-08-21 RX ORDER — KETOROLAC TROMETHAMINE 30 MG/ML
15 INJECTION, SOLUTION INTRAMUSCULAR; INTRAVENOUS EVERY 6 HOURS PRN
Status: DISPENSED | OUTPATIENT
Start: 2018-08-21 | End: 2018-08-23

## 2018-08-21 RX ORDER — METHYLPREDNISOLONE 16 MG/1
16 TABLET ORAL DAILY
Status: COMPLETED | OUTPATIENT
Start: 2018-08-23 | End: 2018-08-23

## 2018-08-21 RX ADMIN — OXYCODONE HYDROCHLORIDE 5 MG: 5 TABLET ORAL at 07:20

## 2018-08-21 RX ADMIN — NAPROXEN 500 MG: 500 TABLET ORAL at 21:40

## 2018-08-21 RX ADMIN — METHYLPREDNISOLONE 24 MG: 16 TABLET ORAL at 10:59

## 2018-08-21 RX ADMIN — MORPHINE SULFATE 2 MG: 2 INJECTION, SOLUTION INTRAMUSCULAR; INTRAVENOUS at 03:31

## 2018-08-21 RX ADMIN — KETOROLAC TROMETHAMINE 15 MG: 30 INJECTION, SOLUTION INTRAMUSCULAR at 21:47

## 2018-08-21 RX ADMIN — KETOROLAC TROMETHAMINE 15 MG: 30 INJECTION, SOLUTION INTRAMUSCULAR at 13:56

## 2018-08-21 RX ADMIN — NAPROXEN 500 MG: 500 TABLET ORAL at 09:04

## 2018-08-21 RX ADMIN — ENOXAPARIN SODIUM 40 MG: 40 INJECTION SUBCUTANEOUS at 09:06

## 2018-08-21 RX ADMIN — ACETAMINOPHEN 650 MG: 325 TABLET, FILM COATED ORAL at 17:14

## 2018-08-21 RX ADMIN — Medication 5 MG: at 00:05

## 2018-08-21 RX ADMIN — Medication 5 MG: at 19:32

## 2018-08-21 RX ADMIN — LIDOCAINE 1 PATCH: 50 PATCH TOPICAL at 09:04

## 2018-08-21 RX ADMIN — ONDANSETRON 4 MG: 2 INJECTION INTRAMUSCULAR; INTRAVENOUS at 17:14

## 2018-08-21 RX ADMIN — NICOTINE 14 MG: 14 PATCH, EXTENDED RELEASE TRANSDERMAL at 09:07

## 2018-08-21 NOTE — PLAN OF CARE
Problem: PHYSICAL THERAPY ADULT  Goal: Performs mobility at highest level of function for planned discharge setting  See evaluation for individualized goals  Treatment/Interventions: Functional transfer training, LE strengthening/ROM, Therapeutic exercise, Endurance training, Patient/family training, Equipment eval/education, Bed mobility, Gait training, Spoke to nursing, OT, Family  Equipment Recommended:  (continue to assess)       See flowsheet documentation for full assessment, interventions and recommendations  Prognosis: Good  Problem List: Decreased strength, Decreased endurance, Impaired balance, Decreased mobility, Pain  Assessment: PT consult received and evaluation complete  Pt is 24 y o  Female admitted from home w/ wife for back pain after putting her dog bowel into her car and feeling severe pain like she pulled something in her back  Pt agreeable to participate w/ therapy and identified by 2 patient identifiers: name and birth date  Pt presenting supine in bed w/ B SCDS donned and wife present  Pt has orders for up with assistance  Orthopedics following patient and has placed order for MRI  PTA pt was independent w/ all functional mobility w/ no DME, lived in multi-level home and had 4 NEYDA w/ railing, independent ADLS, and  independent w/ IADLS, yes driving, no recent falls 0  Pt presents w/ RLE MMT 2/5 functionally not formally assessed 2* pain, LLE MMT 2/5 functionally not formally assessed 2* pain, rolling to the L minAx1, supine>sit modAx2 log roll technique for comfort, sat EOB 3-4x minutes fair+/fair, sit>supine log roll technique for comfort minAx2 and back - left lower back, right lower back  Radiation?: yes - R leg  Loss of bowel/urine control?  no 10/10 pain  At end of evaluation pt left supine in bed with knees elevated in semi-billings position for comfort, B SCDs donned, wife present, all needs in reach, call bell and phone in hand, and RN aware of patient status   Pt has orders for MRI per ortho note  Pt may benefit from back bracing for comfort  ?LSO vs  [place] pending orthopedic recommendations  Pt would benefit from continued skilled PT for deficits in strength, balance, locomotion, stair negotiation, functional endurance, functional mobility, managing back pain with mobility  At this time continue to assess discharge recommendation following OOB mobility  History: co-morbidities, fall risk, mult-level home and NEYDA Exam: strength, balance, locomotion, stair negotiation, functional endurance and functional mobility Barthel Index: 45 Modified Mariela:5 Clinical: unstable/unpredictable: fall risk, 2 person assist, pain that radiates Complexity: high  Barriers to Discharge: Inaccessible home environment  Barriers to Discharge Comments: 4 NEYDA; multi-level home  Recommendation:  (continue to assess pending OOB mobility assessment)     PT - OK to Discharge:  (yes if to rehab, no to home)    See flowsheet documentation for full assessment         Comments: Austin Zapata, PT,DPT

## 2018-08-21 NOTE — PLAN OF CARE
Problem: OCCUPATIONAL THERAPY ADULT  Goal: Performs self-care activities at highest level of function for planned discharge setting  See evaluation for individualized goals  Treatment Interventions: ADL retraining, Functional transfer training, Endurance training, Patient/family training, Equipment evaluation/education, Compensatory technique education, Continued evaluation, Energy conservation, Activityengagement          See flowsheet documentation for full assessment, interventions and recommendations  Limitation: Decreased ADL status, Decreased endurance, Decreased self-care trans, Decreased high-level ADLs  Prognosis: Fair  Assessment: Pt is a 24 y o  female seen for OT evaluation s/p adm to [facility] on 8/20/2018 w/ intractable Back pain   Comorbidities affecting pts functional performance include a significant PMH of asthma  Pt with active OT orders and activity orders for Activity as tolerated, Up with assistance  Pt lives with wife in a multi-level house with 4 NEYDA  At baseline, pt was I w/ ADLs, IADLs, and functional mobility/transfers w/o use of DME/AD, (+) , and reports 0 falls PTA  Upon evaluation, pt currently requires Mod A for LB ADLs, Supervision for UB ADLs, Mod A for toileting, Min A for rolling, and Mod A of 2 for bed mobility 2* the following deficits impacting occupational performance: weakness, decreased strength, decreased balance, decreased tolerance and increased pain  Pt declined OOB activity at this time 2* increased pain in lower back, B/L hips, and R LE  These impairments, as well at pts steps to enter environment, difficulty performing ADLS and difficulty performing IADLS  limit pts ability to safely engage in all baseline areas of occupation, including grooming, bathing, dressing, toileting, functional mobility/transfers, community mobility, laundry , house maintenance, meal prep, cleaning, social participation  and leisure activities    Pt scored overall 45/100 on the Barthel Index  Based on the aforementioned OT evaluation, functional performance deficits, and assessments, pt has been identified as a high complexity evaluation  Pt to continue to benefit from continued acute OT services during hospital stay to address defined deficits and to maximize level of functional independence in the following Occupational Performance areas: grooming, bathing/shower, toilet hygiene, dressing, socialization, health maintenance, functional mobility, community mobility, clothing management, cleaning, meal prep, household maintenance, social participation and transfers to common household surfaces  D/C recommendation TBD pending further evaluation, OOB assessment, and progress upon D/C   OT will continue to follow pt 3-5x/wk to address the following goals to  w/in 10-14 days:     OT Discharge Recommendation: Other (Comment) (TBD pending further eval, OOB assessment, and progress)

## 2018-08-21 NOTE — PROGRESS NOTES
Progress Note - Beatris Call 24 y o  female MRN: [id number]    Unit/Bed#: E5 -01 Encounter: [id number]    Principal Problem:    Back pain intractable  Probable radiculopathy  Active Problems:    Tobacco use    Assessment/Plan:    1-intractable low back pain with radiculopathy--pain moving down the right leg  Right leg extension the 20 decrease limited by pain  No EHL weakness  Ortho input appreciated  MRI of the lumbar spine ordered  Will start patient on pain control and also give a trial of a Medrol Dosepak  Will consult PT    2-ongoing tobacco use-counseled with regards to cessation--patient agreeable to a nicotine patch  Subjective:  Patient admitted with severe intractable low back pain with radiation down the right leg  This occurred after she lifted some dog food waiting about 10 lb into the back of the car  She heard a pop and then developed severe low back pain-she was able to drive and go to her mother's place however subsequently she stated that she was not able to move without significant pain going down the right leg  No bladder bowel incontinence  States that her right leg movement is limited by pain  Physical Exam:   Vitals: Blood pressure 121/72, pulse 77, temperature (!) 97 4 °F (36 3 °C), temperature source Tympanic, resp  rate 18, weight 95 7 kg (211 lb), SpO2 100 %  ,Body mass index is 35 11 kg/m²  Gen:  Pleasant, non-tachypnic, non-dyspnic  Conversant  Heart: regular rate and rhythm, S1S2 present, no murmur, rub or gallop  Lungs: clear to ausculatation bilaterally  No wheezing, crackless, or rhonchi  No accessory muscle use or respiratory distress  Abd: soft, non-tender, non-distended  NABS, no guarding, rebound or peritoneal signs  Extremities: no clubbing, cyanosis or edema  2+pedal pulses bilaterally  Full range of motion  Neuro: awake, alert and oriented  Cranial nerves 2-12 intact  Strength and sensation grossly intact       Skin: warm and dry: no petechiae, purpura and rash      LABS:     Results from last 7 days  Lab Units 08/20/18 1942   WBC Thousand/uL 12 34*   HEMOGLOBIN g/dL 13 2   HEMATOCRIT % 40 3   PLATELETS Thousands/uL 405*       Results from last 7 days  Lab Units 08/20/18 1942   SODIUM mmol/L 137   POTASSIUM mmol/L 4 4   CHLORIDE mmol/L 104   CO2 mmol/L 26   BUN mg/dL 8   CREATININE mg/dL 0 83   GLUCOSE RANDOM mg/dL 96   CALCIUM mg/dL 9 5       Intake/Output Summary (Last 24 hours) at 08/21/18 1027  Last data filed at 08/20/18 1645   Gross per 24 hour   Intake                0 ml   Output               48 ml   Net              -48 ml           Current Facility-Administered Medications:  acetaminophen 650 mg Oral Q6H PRN Julio Cesar Yip, PA-C   aluminum-magnesium hydroxide-simethicone 30 mL Oral Q6H PRN Julio Cesar Yip, PA-C   diazepam 5 mg Intravenous Q6H PRN Julio Cesar Yip, PA-C   enoxaparin 40 mg Subcutaneous Daily Julio Cesar Yip, PA-C   ketorolac 15 mg Intravenous Q6H PRN Randolph Huang MD   lidocaine 1 patch Transdermal Daily Julio Cesar Yip, PA-C   methylPREDNISolone 24 mg Oral Daily Randolph Huang MD   Followed by       Ashli Seymour ON 8/22/2018] methylPREDNISolone 20 mg Oral Daily Randolph Huang MD   Followed by       Ashli Seymour ON 8/23/2018] methylPREDNISolone 16 mg Oral Daily Randolph Huang MD   Followed by       Ashli Seymour ON 8/24/2018] methylPREDNISolone 12 mg Oral Daily Randolph Huang MD   Followed by       Ashli Seymour ON 8/25/2018] methylPREDNISolone 8 mg Oral Daily Randolph Huang MD   Followed by       Ashli Seymour ON 8/26/2018] methylPREDNISolone 4 mg Oral Daily Randolph Huang MD   naproxen 500 mg Oral Q12H [address] Randolph Huang MD   nicotine 14 mg Transdermal Daily Julio Cesar Yip, PA-C   ondansetron 4 mg Intravenous Q6H PRN Julio Cesar Yip, PA-C       Family update- fiancee in the room

## 2018-08-21 NOTE — H&P
History and Physical - [facility]    Patient Information: Rosamaria Oreilly 24 y o  female MRN: [id number]  Unit/Bed#: E5 -01 Encounter: [id number]  Admitting Physician: Jayashree Tucker PA-C  PCP: Bashir Antony MD  Date of Admission:  08/20/18    Assessment/Plan:    Hospital Problem List:     Principal Problem:    Back pain  Active Problems:    Tobacco use      Plan for the Primary Problem(s):  · Intractable back pain  · Admit to med/surg  Patient requiring IV pain meds in ED  Continue IV morphine, toradol and muscle relaxers  Order MRI lumbar spine and ortho consult  Consult PT    Plan for Additional Problems:   · Tobacco use- order nicotine patch    VTE Prophylaxis: Enoxaparin (Lovenox)  / sequential compression device   Code Status: full code  POLST: There is no POLST form on file for this patient (pre-hospital)    Anticipated Length of Stay:  Patient will be admitted on an Observation basis with an anticipated length of stay of  Less than 2 midnights  Justification for Hospital Stay: patient requires IV pain meds and MRI    Total Time for Visit, including Counseling / Coordination of Care: 30 minutes  Greater than 50% of this total time spent on direct patient counseling and coordination of care  Chief Complaint:   Back pain    History of Present Illness:    Rosamaria Oreilly is a 24 y o  female who presents with intractable back pain since this morning when she lifted something that weighed less than 10 pounds into the truck of her car  She thinks she lifted it using poor body mechanics  She felt midline pain in the back immediately  She was able to drive home and get herself in the house where she took a nap  Upon waking from her nap she "couldn't get out of bed" due to pain  She denies radiation into legs  No numbness or tingling  No loss of bowel or bladder control   No history of previous back injuries    Review of Systems:    Review of Systems   Constitutional: Positive for activity change  HENT: Negative  Eyes: Negative  Respiratory: Negative  Cardiovascular: Negative  Gastrointestinal: Negative  Endocrine: Negative  Genitourinary: Negative  Musculoskeletal: Positive for back pain and gait problem  Skin: Negative  Allergic/Immunologic: Negative  Hematological: Negative  Psychiatric/Behavioral: Negative  Past Medical and Surgical History:     Past Medical History:   Diagnosis Date    Asthma        Past Surgical History:   Procedure Laterality Date    CYST REMOVAL      SHOULDER SURGERY      R shoulder       Meds/Allergies:    Prior to Admission medications    Medication Sig Start Date End Date Taking? Authorizing Provider   cyclobenzaprine (FLEXERIL) 5 mg tablet Take 1 tablet by mouth 3 (three) times a day as needed for muscle spasms for up to 30 doses  Patient not taking: Reported on 8/14/2018 11/7/17 8/20/18  Luzmaria Chauhan,      I have reviewed home medications with patient personally  Allergies: No Known Allergies    Social History:     Marital Status: Significant Other   Occupation:  at [employer]  Patient Pre-hospital Living Situation: lives with mother  Patient Pre-hospital Level of Mobility: ambulatory  Patient Pre-hospital Diet Restrictions: none  Substance Use History:   History   Alcohol Use    Yes     Comment: socailly     History   Smoking Status    Current Every Day Smoker    Packs/day: 1 00   Smokeless Tobacco    Never Used     History   Drug Use No       Family History:    non-contributory    Physical Exam:     Vitals:   Blood Pressure: 121/72 (08/20/18 2054)  Pulse: 69 (08/20/18 2054)  Temperature: 97 7 °F (36 5 °C) (08/20/18 2054)  Temp Source: Temporal (08/20/18 2054)  Respirations: 16 (08/20/18 2054)  Weight - Scale: 95 7 kg (211 lb) (08/20/18 1508)  SpO2: 98 % (08/20/18 2054)    Physical Exam   Constitutional: She is oriented to person, place, and time  She appears well-developed and well-nourished  No distress  HENT:   Head: Normocephalic and atraumatic  Eyes: Conjunctivae are normal  Pupils are equal, round, and reactive to light  Neck: Normal range of motion  Neck supple  No JVD present  No tracheal deviation present  No thyromegaly present  Cardiovascular: Normal rate and regular rhythm  Pulmonary/Chest: Effort normal and breath sounds normal  No respiratory distress  Abdominal: Soft  Bowel sounds are normal  She exhibits no distension  There is no tenderness  Musculoskeletal:   Midline lumbar back pain to palpation  No motor deficits  Sensation intact   Lymphadenopathy:     She has no cervical adenopathy  Neurological: She is alert and oriented to person, place, and time  Skin: Skin is warm and dry  No rash noted  She is not diaphoretic  No erythema  No pallor  Psychiatric: She has a normal mood and affect  Her behavior is normal    Vitals reviewed  Additional Data:     Lab Results: I have personally reviewed pertinent reports  Results from last 7 days  Lab Units 08/20/18 1942   WBC Thousand/uL 12 34*   HEMOGLOBIN g/dL 13 2   HEMATOCRIT % 40 3   PLATELETS Thousands/uL 405*   NEUTROS PCT % 81*   LYMPHS PCT % 14   MONOS PCT % 5   EOS PCT % 0       Results from last 7 days  Lab Units 08/20/18 1942   SODIUM mmol/L 137   POTASSIUM mmol/L 4 4   CHLORIDE mmol/L 104   CO2 mmol/L 26   BUN mg/dL 8   CREATININE mg/dL 0 83   CALCIUM mg/dL 9 5   GLUCOSE RANDOM mg/dL 96           Imaging: I have personally reviewed pertinent reports  Xr Spine Lumbar 2 Or 3 Views Injury    Result Date: 8/20/2018  Narrative: LUMBAR SPINE INDICATION:   Low back pain  COMPARISON:  None VIEWS:  XR SPINE LUMBAR 2 OR 3 VIEWS INJURY FINDINGS: There is no evidence of acute fracture or destructive osseous lesion  Alignment is unremarkable  No significant lumbar degenerative change noted  The pedicles appear intact  Soft tissues are unremarkable  Impression: Normal examination   Workstation performed: [id number] EKG, Pathology, and Other Studies Reviewed on Admission:   · EKG: none    Allscripts / Epic Records Reviewed: Yes     ** Please Note: This note has been constructed using a voice recognition system   **

## 2018-08-21 NOTE — CONSULTS
Orthopaedic Surgery - Consultation  Colleen Willson (8 y o  female)   : 1990   MRN: [id number]  Date: 2018   Encounter: [id number]   Unit/Bed#: E5 -01    Consulting Physician:  Christiano Petit PA-C  Requesting Physician: Pascual Swanson MD  Reason for Consult / Principal Problem: Intractable lumbar spine pain    Assessment / Plan  Intractable Lumbar spine pain    · MRI lumbar spine is ordered at this time  · Continue with analgesics as needed  May use heat or ice as needed  · Plan for PT/OT  · Follow-up following MRI study  History of Present Illness  Colleen Willson is a 24 y o  female who presents to [facility] Emergency Room with intractable back pain that started yesterday while lifting a dog bowl into the back of her car  She states that the bowl only weighed about 10 lbs, but when she stood up she felt severe pain in the mid spine  She slowly was able to get into a car and go to her mother's house where when she laid down she was not able to get back up  She states that her pain now is whenever she tries to move with the worst pain in her lumbar spine directly over the spine and some radiation to her right buttocks area  She states that she does have the sensation that touch is later on her right side, she is not numb but does feel that her sensation is diminished on the entire right leg  She is denying any weakness at this time she has feels that she is limited by pain  She denies any prior injury to her back  Patient is denying any bowel or bladder says notes at this time  She is status post ORIF right distal clavicle fracture in 2015 and that is doing okay  Review of Systems  Negative for chest pain and shortness of breath    Medical, Surgical, Family, and Social History    Past Medical History:   Diagnosis Date    Asthma        Past Surgical History:   Procedure Laterality Date    CYST REMOVAL      SHOULDER SURGERY      R shoulder       History reviewed   No pertinent family history  Social History     Occupational History    Not on file  Social History Main Topics    Smoking status: Current Every Day Smoker     Packs/day: 1 00    Smokeless tobacco: Never Used    Alcohol use Yes      Comment: socailly    Drug use: No    Sexual activity: Not on file       No Known Allergies    Current Facility-Administered Medications   Medication Dose Route Frequency    acetaminophen (TYLENOL) tablet 650 mg  650 mg Oral Q6H PRN    aluminum-magnesium hydroxide-simethicone (MYLANTA) 200-200-20 mg/5 mL oral suspension 30 mL  30 mL Oral Q6H PRN    diazepam (VALIUM) injection 5 mg  5 mg Intravenous Q6H PRN    enoxaparin (LOVENOX) subcutaneous injection 40 mg  40 mg Subcutaneous Daily    lidocaine (LIDODERM) 5 % patch 1 patch  1 patch Transdermal Daily    morphine injection 2 mg  2 mg Intravenous Q4H PRN    nicotine (NICODERM CQ) 14 mg/24hr TD 24 hr patch 14 mg  14 mg Transdermal Daily    ondansetron (ZOFRAN) injection 4 mg  4 mg Intravenous Q6H PRN    oxyCODONE (ROXICODONE) IR tablet 5 mg  5 mg Oral Q4H PRN     No prescriptions prior to admission  Vitals  Temp:  [97 4 °F (36 3 °C)-98 2 °F (36 8 °C)] 97 4 °F (36 3 °C)  HR:  [66-87] 77  Resp:  [16-18] 18  BP: (113-121)/(69-77) 121/72  Body mass index is 35 11 kg/m²  I/O last 24 hours: In: -   Out: 50 [Urine:48]    Physical Exam  General:  Alert & oriented x3, no distress, appears stated age  HEENT:  EOMI, eyes clear, hearing intact, mucous membranes moist  Neck:  Supple, non-tender, trachea midline, no lymphadenopathy  Cardiovascular:  Regular rate, no discernable arrhythmia  Pulmonary:  Regular rate, respirations non-labored   Abdominal:  Soft, non-tender    Lumbar Spine Exam  Alignment:  Normal lumbar alignment  Normal resting hip posture  Normal knee alignment  Normal ankle alignment  Inspection:  No swelling  No edema  No erythema  No deformity    Palpation:  Moderate to severe tenderness at Lumbar spine over the spinous processes, mild tenderness to palpation over the musculature on both sides of the spinous process and over the right gluteal area  ROM:  Patient was not able to perform lumbar range-of-motion exercises, she was able logroll but with significant pain in the back  Her hip range of motion seemed intact though she was limited on the right by pain with flexion past 90°, Normal knee ROM  Normal ankle ROM  Strength:  Her hip strength was limited due to pain at this time, though slightly limited on the right due to pain I did feel that she had strength of 5/5 quadriceps and hamstrings  5/5 AT, GSC, PT, and peroneals  Tests:  (+) Straight leg raise on the right  Neurovascular:  Sensation on her right leg was diminished per patient over entire leg including foot  She denied numbness but felt that it felt later when I was touching her  Left leg sensation is intact  Toes warm and perfused  Gait:  Not tested  Imaging  X-rays of the lumbar spine from 08/20/2018 showed normal examination with no evidence of fracture her destructive lesions  Lab Results  (I have personally reviewed pertinent lab results )    Results from last 7 days  Lab Units 08/20/18 1942   WBC Thousand/uL 12 34*   HEMOGLOBIN g/dL 13 2   HEMATOCRIT % 40 3   PLATELETS Thousands/uL 405*   RBC Million/uL 4 86   MCV fL 83   MCH pg 27 2   MCHC g/dL 32 8   RDW % 14 8   MPV fL 9 9   NRBC AUTO /100 WBCs 0           Results from last 7 days  Lab Units 08/20/18 1942   SODIUM mmol/L 137   POTASSIUM mmol/L 4 4   CHLORIDE mmol/L 104   CO2 mmol/L 26   ANION GAP mmol/L 7   BUN mg/dL 8   CREATININE mg/dL 0 83   EGFR ml/min/1 73sq m 98   CALCIUM mg/dL 9 5   GLUCOSE RANDOM mg/dL 96               Code Status  Level 1 - Full Code    Counseling / Coordination of Care  Total floor / unit time spent today 20 minutes  Greater than 50% of total time was spent with the patient and / or family counseling and / or coordination of care      Miya Ladd PA-C

## 2018-08-21 NOTE — OCCUPATIONAL THERAPY NOTE
[address]  Evaluation     Patient Name: Cheikh Hess  [id number] Date: 8/21/2018  Problem List  Patient Active Problem List   Diagnosis    Closed displaced fracture of acromial end of right clavicle with routine healing    Back pain    Tobacco use     Past Medical History  Past Medical History:   Diagnosis Date    Asthma      Past Surgical History  Past Surgical History:   Procedure Laterality Date    CYST REMOVAL      SHOULDER SURGERY      R shoulder         08/21/18 1216   Note Type   Note type Eval only   Restrictions/Precautions   Weight Bearing Precautions Per Order No   Other Precautions Fall Risk;Pain   Pain Assessment   Pain Assessment 0-10   Pain Score Worst Possible Pain   Pain Type Acute pain   Pain Location Back;Hip;Leg   Pain Orientation Lower;Right   Pain Radiating Towards R LE    Pain Frequency Constant/continuous   Pain Onset Ongoing   Clinical Progression Gradually improving   Effect of Pain on Daily Activities Increased pain with activity   Patient's Stated Pain Goal No pain   Hospital Pain Intervention(s) Repositioned; Ambulation/increased activity; Emotional support   Response to Interventions Tolerated OT   Home Living   Type of [address] [place] [address] Multi-level;Bed/bath upstairs;Stairs to enter with rails  (4 NEYDA)   Bathroom Shower/Tub Tub/shower unit   Bathroom Toilet Standard   Bathroom Equipment Other (Comment)  (none per pt report)   [address] Other (Comment)  (none per pt report)   Additional Comments Pt lives with wife in a multi-level house with 4 NEYDA     Prior Function   Level of [place] Independent with ADLs and functional mobility   Lives With Spouse  (Wife)   Receives Help From Family;Friend(s)   ADL Assistance Independent   IADLs Independent   Falls in the last 6 months 0   Comments At baseline, pt was I w/ ADLs, IADLs, and functional mobility/transfers w/o use of DME/AD, (+) , and reports 0 falls PTA   Lifestyle Autonomy At baseline, pt was I w/ ADLs, IADLs, and functional mobility/transfers w/o use of DME/AD, (+) , and reports 0 falls PTA   Reciprocal Relationships Lives with wife   Intrinsic Gratification Spending time with family and friends   Psychosocial   Psychosocial (WDL) WDL   ADL   Eating Assistance 6  Modified independent   Grooming Assistance 5  Supervision/Setup   UB Bathing Assistance 5  Supervision/Setup   LB Bathing Assistance 3  Moderate Assistance   UB Dressing Assistance 5  Supervision/Setup    [facility] 3  Moderate [address]  3  Moderate Assistance   Bed Mobility   Rolling L 4  Minimal assistance   Additional items Assist x 1;HOB elevated; Bedrails; Increased time required;Verbal cues;LE management   Supine to Sit 3  Moderate assistance   Additional items Assist x 2;HOB elevated; Bedrails; Increased time required;Verbal cues;LE management  (logroll technique)   Sit to Supine 4  Minimal assistance   Additional items Assist x 2; Increased time required;Verbal cues;LE management  (logroll technique)   Additional Comments Pt able to tolerate approx  3-4 mins sitting EOB, requesting to return to supine position after 3-4 mins 2* increased pain  Pt lying supine at end of session with call bell and phone within reach  All needs met and pt reports no further questions for OT at this time   Transfers   Sit to Stand Unable to assess   Stand to Sit Unable to assess   Additional Comments Pt declined OOB activity at this time 2* increased pain in lower back, B/L hips, and R LE  Functional Mobility   Additional Comments Pt declined OOB activity at this time 2* increased pain in lower back, B/L hips, and R LE      Balance   Static Sitting Fair +   Dynamic Sitting Fair   Activity Tolerance   Activity Tolerance Patient limited by fatigue;Patient limited by pain   Nurse Made Aware Pt appropriate to be seen per ADELSO To   RUE Assessment   RUE Assessment WFL   LUE Assessment LUE Assessment WFL   Hand Function   Gross Motor Coordination Functional   Fine Motor Coordination Functional   Sensation   Light Touch No apparent deficits   Sharp/Dull No apparent deficits   Proprioception   Proprioception No apparent deficits   Vision - Complex Assessment   Ocular Range of Motion WFL   Acuity Able to read clock/calendar on wall without difficulty   Perception   Inattention/Neglect Appears intact   Cognition   Overall Cognitive Status [facility]   Arousal/Participation Alert; Cooperative   Attention Within functional limits   Orientation Level Oriented X4   Memory Within functional limits   Following Commands Follows multistep commands without difficulty   Assessment   Limitation Decreased ADL status; Decreased endurance;Decreased self-care trans;Decreased high-level ADLs   Prognosis Fair   Assessment Pt is a 24 y o  female seen for OT evaluation s/p adm to [facility] on 8/20/2018 w/ intractable Back pain   Comorbidities affecting pts functional performance include a significant PMH of asthma  Pt with active OT orders and activity orders for Activity as tolerated, Up with assistance  Pt lives with wife in a multi-level house with 4 NEYDA  At baseline, pt was I w/ ADLs, IADLs, and functional mobility/transfers w/o use of DME/AD, (+) , and reports 0 falls PTA  Upon evaluation, pt currently requires Mod A for LB ADLs, Supervision for UB ADLs, Mod A for toileting, Min A for rolling, and Mod A of 2 for bed mobility 2* the following deficits impacting occupational performance: weakness, decreased strength, decreased balance, decreased tolerance and increased pain  Pt declined OOB activity at this time 2* increased pain in lower back, B/L hips, and R LE   These impairments, as well at pts steps to enter environment, difficulty performing ADLS and difficulty performing IADLS  limit pts ability to safely engage in all baseline areas of occupation, including grooming, bathing, dressing, toileting, functional mobility/transfers, community mobility, laundry , house maintenance, meal prep, cleaning, social participation  and leisure activities   Pt scored overall 45/100 on the Barthel Index  Based on the aforementioned OT evaluation, functional performance deficits, and assessments, pt has been identified as a high complexity evaluation  Pt to continue to benefit from continued acute OT services during hospital stay to address defined deficits and to maximize level of functional independence in the following Occupational Performance areas: grooming, bathing/shower, toilet hygiene, dressing, socialization, health maintenance, functional mobility, community mobility, clothing management, cleaning, meal prep, household maintenance, social participation and transfers to common household surfaces  D/C recommendation TBD pending further evaluation, OOB assessment, and progress upon D/C  OT will continue to follow pt 3-5x/wk to address the following goals to  w/in 10-14 days:   Goals   Patient Goals to have less pain   LTG Time Frame 10-14   Long Term Goal Please refer to LTGs listed below   Plan   Treatment Interventions ADL retraining;Functional transfer training; Endurance training;Patient/family training;Equipment evaluation/education; Compensatory technique education;Continued evaluation; Energy conservation; Activityengagement   Goal Expiration Date 18   Treatment Day 0   OT Frequency 3-5x/wk   Recommendation   OT Discharge Recommendation Other (Comment)  (TBD pending further eval, OOB assessment, and progress)   Barthel Index   Feeding 10   Bathing 0   Grooming Score 5   Dressing Score 5   Bladder Score 10   Bowels Score 10   Toilet Use Score 0   Transfers (Bed/Chair) Score 5   Mobility (Level Surface) Score 0   Stairs Score 0   Barthel Index Score 45   Modified Christine Scale   Modified Christine Scale 5        GOALS    1) Pt will improve activity tolerance to G for min 30 min txment sessions    2) Pt will complete bed mobility at a Mod I level w/ G balance/safety demonstrated     3) Pt will complete UB/LB dressing/self care w/ mod I using adaptive device and DME as needed    4) Pt will complete bathing w/ Mod I w/ use of AE and DME as needed    5) Pt will complete toileting w/ mod I w/ G hygiene/thoroughness using DME as needed    6) Pt will continue to tolerate continued functional mobility/transfers and OOB assessment and appropriate goals will be established     7) Pt will participate in simulated IADL management task to increase independence to Mod I w/ G safety and endurance    8) Pt will engage in ongoing cognitive assessment w/ G participation w/ mod I to assist w/ safe d/c planning/recommendations    9) Pt will demonstrate G carryover of pt/caregiver education and training as appropriate w/ mod I w/o cues w/ good tolerance    10) Pt will demonstrate 100% carryover of energy conservation techniques w/ mod I t/o functional I/ADL/leisure tasks w/o cues s/p skilled education         Lissette Barahona OTR/L

## 2018-08-21 NOTE — PROGRESS NOTES
Patient admitted to hospital for back pain, actively being managed for back pain  Not a candidate for Comprehensive Spine telephone triage at this time

## 2018-08-21 NOTE — PHYSICAL THERAPY NOTE
PT EVALUATION  Time-In: 1200  Time-Out: 1217  Total Time: 17 minutes    24 y o     [id number]    Back pain [M54 9]  Acute bilateral low back pain without sciatica [M54 5]    Length of Stay: 0    Past Medical History:   Diagnosis Date    Asthma          Past Surgical History:   Procedure Laterality Date    CYST REMOVAL      SHOULDER SURGERY      R shoulder      08/21/18 1217   Note Type   Note type Eval only   Pain Assessment   Pain Assessment 0-10   Pain Score Worst Possible Pain   Pain Type Acute pain   Pain Location Back;Hip;Leg   Pain Orientation Lower;Right;Bilateral  (R legs, B hips, lower back)   Pain Radiating Towards shoots down RLE   Pain Frequency Constant/continuous   Pain Onset Ongoing   Clinical Progression Gradually improving   Effect of Pain on Daily Activities increased pain with activity and shoots down R hip and R leg with activity   Patient's Stated Pain Goal No pain   Hospital Pain Intervention(s) Repositioned; Ambulation/increased activity; Elevated; Emotional support; Rest   Response to Interventions tolerated PT   Home Living   Type of [address] [place] [address] Multi-level;Stairs to enter with rails  (4 NEYDA w/ railing; 3 level home; bedroom on second floor)   Bathroom Shower/Tub Tub/shower unit   [address] (no DME)   Prior Function   Level of [place] Independent with ADLs and functional mobility   Lives With Spouse  (wife)   Receives Help From Family;Friend(s)   ADL Assistance Independent   IADLs Independent   Falls in the last 6 months 0   Comments no use of DME for mobility, (+)    Restrictions/Precautions   Weight Bearing Precautions Per Order No   Other Precautions Fall Risk;Pain  (spinal precautions for comfort as needed)   General   Family/Caregiver Present Yes  (wife)   Cognition   Overall Cognitive Status WFL   Arousal/Participation Alert   Orientation Level Oriented X4   Memory Within functional limits   Following Commands Follows multistep commands without difficulty   RUE Assessment   RUE Assessment WFL   LUE Assessment   LUE Assessment WFL   RLE Assessment   RLE Assessment X  (2/5 functionally; not formally assessed 2* pain)   LLE Assessment   LLE Assessment X  (2/5 functionally; not formally assessed 2* pain)   Coordination   Movements are Fluid and Coordinated 1   Sensation X  (right leg feels like it is asleep)   Light Touch   RLE Light Touch Impaired   RLE Light Touch Comments foot feels asleep   LLE Light Touch Grossly intact   Bed Mobility   Rolling L 4  Minimal assistance   Additional items Assist x 1;HOB elevated; Bedrails; Increased time required;Verbal cues;LE management   Supine to Sit 3  Moderate assistance   Additional items Assist x 2; Increased time required;HOB elevated; Bedrails;LE management;Verbal cues   Sit to Supine 4  Minimal assistance   Additional items Assist x 2; Increased time required;HOB elevated; Bedrails;Verbal cues;LE management   Additional Comments Step by step verbal cueing for sequencing of log roll technique as way to protect back  Pt only able to tolerate sitting for 3-4 minutes 2* pain   Transfers   Sit to Stand Unable to assess   Stand to Sit Unable to assess   Additional Comments unable to tolerate at this time 2* pain   Balance   Static Sitting Fair +   Dynamic Sitting Fair   Endurance Deficit   Endurance Deficit Yes   Endurance Deficit Description pain and fatigue   Activity Tolerance   Activity Tolerance Patient limited by fatigue;Patient limited by pain   Nurse Made Aware ADELSO Mcbride   Assessment   Prognosis Good   Problem List Decreased strength;Decreased endurance; Impaired balance;Decreased mobility;Pain   Assessment PT consult received and evaluation complete  Pt is 24 y o  Female admitted from home w/ wife for back pain after putting her dog bowel into her car and feeling severe pain like she pulled something in her back   Pt agreeable to participate w/ therapy and identified by 2 patient identifiers: name and birth date  Pt presenting supine in bed w/ B SCDS donned and wife present  Pt has orders for up with assistance  Orthopedics following patient and has placed order for MRI  PTA pt was independent w/ all functional mobility w/ no DME, lived in multi-level home and had 4 NEYDA w/ railing, independent ADLS, and  independent w/ IADLS, yes driving, no recent falls 0  Pt presents w/ RLE MMT 2/5 functionally not formally assessed 2* pain, LLE MMT 2/5 functionally not formally assessed 2* pain, rolling to the L minAx1, supine>sit modAx2 log roll technique for comfort, sat EOB 3-4x minutes fair+/fair, sit>supine log roll technique for comfort minAx2 and back - left lower back, right lower back  Radiation?: yes - R leg  Loss of bowel/urine control?  no 10/10 pain  At end of evaluation pt left supine in bed with knees elevated in semi-billings position for comfort, B SCDs donned, wife present, all needs in reach, call bell and phone in hand, and RN aware of patient status  Pt has orders for MRI per ortho note  Pt may benefit from back bracing for comfort  ?LSO vs  [place] pending orthopedic recommendations  Pt would benefit from continued skilled PT for deficits in strength, balance, locomotion, stair negotiation, functional endurance, functional mobility, managing back pain with mobility  At this time continue to assess discharge recommendation following OOB mobility   History: co-morbidities, fall risk, mult-level home and NEYDA Exam: strength, balance, locomotion, stair negotiation, functional endurance and functional mobility Barthel Index: 45 Modified Parker:5 Clinical: unstable/unpredictable: fall risk, 2 person assist, pain that radiates Complexity: high   Barriers to Discharge Inaccessible home environment   Barriers to Discharge Comments 4 NEYDA; multi-level home   Goals   Patient Goals "to have less pain"   LTG Expiration Date 08/31/18   Long Term Goal #1 In 10 days pt will demonstrate: bed mobility mod(I) to promote OOB with least amount of back pain, sit<>stand and functional transfers mod (I) w/ least restrictive AD device to promote OOB mobility w/ least amount of back pain, gait training 150ft mod (I) w/ least restrictive AD to promote return to ambulation with least amount of back pain, 4 steps mod (I) w/ railing to trial for home entrance w/ least amount of back pain, 15 stairs mod (I) w/ railing to trial for negotiation between floors at home w/ least amount of back pain, improve BLE by 1/2 grade strength to optimize functional mobility, gait and stairs, improve balance by 1 grade to decrease fall risk, improve activity tolerance to >45 minutes w/o rest to improve functional endurance for home w/ least amount of back pain  Treatment Day 0   Plan   Treatment/Interventions Functional transfer training;LE strengthening/ROM; Therapeutic exercise; Endurance training;Patient/family training;Equipment eval/education; Bed mobility;Gait training;Spoke to nursing;OT;Family   PT Frequency (4-5x/wk)   Recommendation   Recommendation (continue to assess pending OOB mobility assessment)   Equipment Recommended (continue to assess)   PT - OK to Discharge (yes if to rehab, no to home)   Modified Mariela Scale   Modified Bowler Scale 5   Barthel Index   Feeding 10   Bathing 0   Grooming Score 5   Dressing Score 5   Bladder Score 10   Bowels Score 10   Toilet Use Score 0   Transfers (Bed/Chair) Score 5   Mobility (Level Surface) Score 0   Stairs Score 0   Barthel Index Score 45       Bonny Sosa, PT ,DPT

## 2018-08-21 NOTE — CASE MANAGEMENT
Initial Clinical Review    Admission: Date/Time/Statement:   OBS  ORDER     8/20  @    [address]   ENTERED   8/22 @   0816    The patient has intractable low back pain needing IV pain medications and is a 2 person assist hence will change her status to inpatient and I expect a more than 2 midnight stay for this patient      ED: Date/Time/Mode of Arrival:   ED Arrival Information     Expected Arrival Acuity Means of Arrival Escorted By Service Admission Type    - 8/20/2018 15:03 Less Urgent Ambulance [facility] Urgent    Arrival Complaint    Back Pain          Chief Complaint:   Chief Complaint   Patient presents with    Back Pain     Patient was putting something in the trunk of her car this morning and felt something pull, has had back pain since; reports no relief with Tylenol; denies urinary complaints  History of Illness: Antwan Barker is a 24 y o  female who presents with intractable back pain since this morning when she lifted something that weighed less than 10 pounds into the truck of her car  She thinks she lifted it using poor body mechanics  She felt midline pain in the back immediately  She was able to drive home and get herself in the house where she took a nap  Upon waking from her nap she "couldn't get out of bed" due to pain  She denies radiation into legs  No numbness or tingling  No loss of bowel or bladder control   No history of previous back injuries       ED Vital Signs:   ED Triage Vitals [08/20/18 1508]   Temperature Pulse Respirations Blood Pressure SpO2   98 2 °F (36 8 °C) 87 18 118/77 100 %      Temp Source Heart Rate Source Patient Position - Orthostatic VS BP Location FiO2 (%)   Oral Monitor Lying Right arm --      Pain Score       Worst Possible Pain        Wt Readings from Last 1 Encounters:   08/20/18 95 7 kg (211 lb)       Vital Signs (abnormal):    above    Abnormal Labs/Diagnostic Test Results:     WBC  12 34  Abs  neutro    9 96  paltelets 405  X ray  L/S  Spine:  nl    ED Treatment:   Medication Administration from 08/20/2018 1503 to 08/20/2018 2030       Date/Time Order Dose Route Action Action by Comments     08/20/2018 1611 ketorolac (TORADOL) injection 30 mg 30 mg Intramuscular Given Ivy Erickson RN      08/20/2018 1613 acetaminophen (TYLENOL) tablet 975 mg 975 mg Oral Given Ivy Erickson RN      08/20/2018 1609 lidocaine (LIDODERM) 5 % patch 1 patch 1 patch Transdermal Medication Applied Ivy Erickson RN      08/20/2018 1731 cyclobenzaprine (FLEXERIL) tablet 10 mg 10 mg Oral Given Ivy Erickson RN      08/20/2018 1755 traMADol (ULTRAM) tablet 50 mg 50 mg Oral Given Andrew Dudley RN      08/20/2018 1943 morphine (PF) 4 mg/mL injection 6 mg 6 mg Intravenous Given Sergei Carvajal RN           Past Medical/Surgical History: Active Ambulatory Problems     Diagnosis Date Noted    Closed displaced fracture of acromial end of right clavicle with routine healing 08/14/2018     Resolved Ambulatory Problems     Diagnosis Date Noted    No Resolved Ambulatory Problems     Past Medical History:   Diagnosis Date    Asthma        Admitting Diagnosis: Back pain [M54 9]  Acute bilateral low back pain without sciatica [M54 5]    Age/Sex: 24 y o  female    · Assessment/Plan: Intractable back pain  ? Admit to med/surg  Patient requiring IV pain meds in ED  Continue IV morphine, toradol and muscle relaxers  Order MRI lumbar spine and ortho consult  Consult PT     Plan for Additional Problems:   · Tobacco use- order nicotine patch     VTE Prophylaxis: Enoxaparin (Lovenox)  / sequential compression device   Code Status: full code  POLST: There is no POLST form on file for this patient (pre-hospital)     Anticipated Length of Stay:  Patient will be admitted on an Observation basis with an anticipated length of stay of  Less than 2 midnights     Justification for Hospital Stay: patient requires IV pain meds and MRI    Admission Orders:   OBS  ORDER  8/20  @ 1950    IP ORDER   ENTERED   8/22 @  0816    Scheduled Meds:   Current Facility-Administered Medications:  acetaminophen 650 mg Oral Q6H PRN Aditya Mom, LIV   aluminum-magnesium hydroxide-simethicone 30 mL Oral Q6H PRN Aditya Mom, LIV   diazepam 5 mg Intravenous Q6H PRN Aditya Mom, LIV   enoxaparin 40 mg Subcutaneous Daily Aditya Mom, LIV   ketorolac 15 mg Intravenous Q6H PRN Dago Smiley MD   lidocaine 1 patch Transdermal Daily Aditya Mom, LIV   methylPREDNISolone 24 mg Oral Daily Dago Smiley MD   Followed by       Uzma Vazquez ON 8/22/2018] methylPREDNISolone 20 mg Oral Daily Dago Smiley MD   Followed by       Uzma Vazquez ON 8/23/2018] methylPREDNISolone 16 mg Oral Daily Dago Smiley MD   Followed by       Uzma Vazquez ON 8/24/2018] methylPREDNISolone 12 mg Oral Daily Dago Smiley MD   Followed by       Uzma Vazquez ON 8/25/2018] methylPREDNISolone 8 mg Oral Daily Dago Smiley MD   Followed by       Uzma Vazquez ON 8/26/2018] methylPREDNISolone 4 mg Oral Daily Dago Smiley MD   naproxen 500 mg Oral Q12H [address] Dago Smiley MD   nicotine 14 mg Transdermal Daily Aditya Mom, LIV   ondansetron 4 mg Intravenous Q6H PRN Aditya Mom, LIV     Continuous Infusions:    PRN Meds:   acetaminophen    aluminum-magnesium hydroxide-simethicone    diazepam    ketorolac    ondansetron     PT/OT  Reg diet  Cons ortho  MRI  L/S  Spine  IV  Valium  PRN (  x2  8/21  And  x1  8/22  thus far)  IV  MSo4  Prn (  X 1  8/21 )  IV  toradol  PRN  (  x2  8/21  And x 1  8/22 thus far)  IV  zofran Prn ( x1  8/21)    Per  Ortho Consult:  Intractable Lumbar spine pain     · MRI lumbar spine is ordered at this time  · Continue with analgesics as needed  May use heat or ice as needed  · Plan for PT/OT    · Follow-up following MRI study      History of Present Illness  Izabel Solis is a 24 y o  female who presents to [facility] with intractable back pain that started yesterday while lifting a dog bowl into the back of her car  She states that the bowl only weighed about 10 lbs, but when she stood up she felt severe pain in the mid spine  She slowly was able to get into a car and go to her mother's house where when she laid down she was not able to get back up  She states that her pain now is whenever she tries to move with the worst pain in her lumbar spine directly over the spine and some radiation to her right buttocks area  She states that she does have the sensation that touch is later on her right side, she is not numb but does feel that her sensation is diminished on the entire right leg  She is denying any weakness at this time she has feels that she is limited by pain  She denies any prior injury to her back  Patient is denying any bowel or bladder says notes at this time  She is status post ORIF right distal clavicle fracture in December of 2015 and that is doing okay    PROGRESS  NOTE   8/22    Patient still with severe low back pain  Currently set to person assist   MRI reveals noncompressive lumbar degenerative changes L3-4 4 5 and S1  Small disc protrusion without significant nerve impingement  Patient will need short-term rehab  Continue current management        Thank you,  [facility] Review Department  Phone: [phone]; Fax [phone]  ATTENTION: Please call with any questions or concerns to [phone]  and carefully follow the prompts so that you are directed to the right person  Send all requests for admission clinical reviews, approved or denied determinations and any other requests to fax [phone]   All voicemails are confidential

## 2018-08-22 PROCEDURE — 99232 SBSQ HOSP IP/OBS MODERATE 35: CPT | Performed by: HOSPITALIST

## 2018-08-22 PROCEDURE — 97116 GAIT TRAINING THERAPY: CPT

## 2018-08-22 PROCEDURE — 97760 ORTHOTIC MGMT&TRAING 1ST ENC: CPT

## 2018-08-22 PROCEDURE — 97535 SELF CARE MNGMENT TRAINING: CPT

## 2018-08-22 PROCEDURE — 99231 SBSQ HOSP IP/OBS SF/LOW 25: CPT | Performed by: PHYSICIAN ASSISTANT

## 2018-08-22 PROCEDURE — 97530 THERAPEUTIC ACTIVITIES: CPT

## 2018-08-22 RX ADMIN — KETOROLAC TROMETHAMINE 15 MG: 30 INJECTION, SOLUTION INTRAMUSCULAR at 09:13

## 2018-08-22 RX ADMIN — NAPROXEN 500 MG: 500 TABLET ORAL at 09:14

## 2018-08-22 RX ADMIN — Medication 5 MG: at 04:42

## 2018-08-22 RX ADMIN — ENOXAPARIN SODIUM 40 MG: 40 INJECTION SUBCUTANEOUS at 09:14

## 2018-08-22 RX ADMIN — BISACODYL 5 MG: 5 TABLET, COATED ORAL at 09:23

## 2018-08-22 RX ADMIN — NICOTINE 14 MG: 14 PATCH, EXTENDED RELEASE TRANSDERMAL at 09:14

## 2018-08-22 RX ADMIN — NAPROXEN 500 MG: 500 TABLET ORAL at 20:59

## 2018-08-22 RX ADMIN — KETOROLAC TROMETHAMINE 15 MG: 30 INJECTION, SOLUTION INTRAMUSCULAR at 15:06

## 2018-08-22 RX ADMIN — Medication 5 MG: at 19:48

## 2018-08-22 RX ADMIN — METHYLPREDNISOLONE 20 MG: 16 TABLET ORAL at 09:15

## 2018-08-22 RX ADMIN — LIDOCAINE 1 PATCH: 50 PATCH TOPICAL at 09:13

## 2018-08-22 RX ADMIN — KETOROLAC TROMETHAMINE 15 MG: 30 INJECTION, SOLUTION INTRAMUSCULAR at 23:45

## 2018-08-22 NOTE — PHYSICAL THERAPY NOTE
PHYSICAL THERAPY NOTE          Patient Name: Cheikh Hess  [id number] Date: 8/22/2018  Time in: 1325  Time out: 1355  Total Time: 30 minutes  As part of treatment:                     Orthotic Note            Date: 8/22/2018      Patient Name: Cheikh Hess            Reason for Consult:  Quick draw brace for out of bed for comfort as needed ordered by Milvia Hsu PA-C  Pt fitted and educated at EOB during treatment on appropriately donning, doffing and brace management  Both patient and pt's wife verbalize understanding  Pt reported back pain to cease with donning of brace and just to have pain in hips  Recommendations:  Wear as needed for comfort  Delfino Zendejas PT,DPT   08/22/18 1355   Pain Assessment   Pain Assessment 0-10   Pain Score 8   Pain Type Acute pain   Pain Location Hip   Pain Orientation Bilateral  (greater pain in R hip compared to L hip)   Pain Frequency Constant/continuous   Pain Onset Ongoing   Clinical Progression Gradually improving   Effect of Pain on Daily Activities increased with activity   Patient's Stated Pain Goal No pain   Hospital Pain Intervention(s) Repositioned; Ambulation/increased activity; Emotional support; Rest   Response to Interventions tolerated PT; donning of back brace relieved back pain just complaints of pain in B hips  Multiple Pain Sites No   Restrictions/Precautions   Weight Bearing Precautions Per Order No   Braces or Orthoses Other (Comment)  (Quick Draw brace for comfort)   Other Precautions Fall Risk;Pain  (spinal precautions just for comfort PRN)   General   Chart Reviewed Yes   Response to Previous Treatment Patient with no complaints from previous session  Family/Caregiver Present Yes  (wife)   Cognition   Overall Cognitive Status WFL   Arousal/Participation Alert; Responsive; Cooperative   Attention Within functional limits   Orientation Level Oriented X4   Memory Within functional limits   Following Commands Follows one step commands without difficulty   Subjective   Subjective "All I've wanted is to get up to the bathroom"   Bed Mobility   Rolling L 4  Minimal assistance   Additional items Assist x 1; Increased time required;Verbal cues;LE management; Bedrails  (HOB flat)   Supine to Sit 4  Minimal assistance   Additional items Assist x 2; Increased time required;Verbal cues; Bedrails;LE management  (log roll technique for comfort)   Sit to Supine 4  Minimal assistance   Additional items Assist x 1;Bedrails; Increased time required;Verbal cues;LE management  (log roll technique for comfort)   Additional Comments Pt received supine in bed  Pt provided w/ cueing to recall log rolling technique for optimal comfort in back  Transfers   Sit to Stand 4  Minimal assistance   Additional items Assist x 1; Increased time required;Verbal cues; Bedrails   Stand to Sit 4  Minimal assistance   Additional items Assist x 1;Bedrails; Increased time required;Verbal cues   Additional Comments VC for hand placement and technique  Educated pt to scoot bottom out to the edge to provide optimal positioning and leverage when going to stand up  Ambulation/Elevation   Gait pattern Short stride;Narrow PALMA  (small steps; rigid trunk)   Gait Assistance 5  Supervision   Additional items Assist x 1;Verbal cues   Assistive Device Rolling walker   Distance 18ftx2; to/from bathroom limited distance 2* pain in hips   Stair Management Assistance Not tested   Balance   Static Sitting Fair +   Dynamic Sitting Fair   Static Standing Fair   Dynamic Standing Fair   Ambulatory Fair   Endurance Deficit   Endurance Deficit Yes   Endurance Deficit Description pain and fatigue   Activity Tolerance   Activity Tolerance Patient limited by fatigue;Patient limited by pain   Nurse Lulu Fu RN   Assessment   Prognosis Good   Problem List Decreased strength;Decreased endurance; Impaired balance;Decreased mobility;Pain Assessment Pt agreeable to participate with therapy  Pt wanting to get up and go to the bathroom  Pt received supine in bed with wife present  Pt has orders from ortho for quick draw brace for comfort  Pt demonstrates rolling to the L minAx1, supine>sit log roll minAx2, sit<>stand minAx1 w/ to/from RW, gait training supervision w/ RW and VC for sequencing and technique, sit>supine minAx1 log roll  Pt educated on spinal precautions for comfort PRN  At end of treatment pt left supine in bed with quick draw brace donned, all needs in reach, call bell and phone in hand, RN aware, and no further questions or concerns for PT at this time  Pt needs to trial stairs prior to being safe to discharge to home environment  At this time would recommend d/c home w/ family support, RW, quick draw brace PRN, and outpt PT for back pain  Barriers to Discharge Inaccessible home environment   Barriers to Discharge Comments 4 NEYDA; multi-level home   Goals   Patient Goals "to go to the bathroom"   LTG Expiration Date 08/31/18   Long Term Goal #1 Goal for brace added: pt will demonstrate: bed mobility mod(I) to promote OOB with least amount of back pain, sit<>stand and functional transfers mod (I) w/ least restrictive AD device to promote OOB mobility w/ least amount of back pain, gait training 150ft mod (I) w/ least restrictive AD to promote return to ambulation with least amount of back pain, 4 steps mod (I) w/ railing to trial for home entrance w/ least amount of back pain, 15 stairs mod (I) w/ railing to trial for negotiation between floors at home w/ least amount of back pain, improve BLE by 1/2 grade strength to optimize functional mobility, gait and stairs, improve balance by 1 grade to decrease fall risk, improve activity tolerance to >45 minutes w/o rest to improve functional endurance for home w/ least amount of back pain   Pt and family able to demonstrate appropriate donning and doffing + care of quick draw brace as needed for comfort to optimize functional mobility and out of bed function  Treatment Day 1   Plan   Treatment/Interventions Functional transfer training; Endurance training;Patient/family training;Equipment eval/education; Bed mobility;Gait training;Spoke to nursing;OT;Family   Progress Progressing toward goals   PT Frequency (4-5x/wk)   Recommendation   Recommendation Home with family support; Outpatient PT  (outpt PT for back pain)   Equipment Recommended Walker   PT - OK to Discharge No   Additional Comments Needs to trial stairs prior to discharge home     Braulio Habermann, PT,DPT

## 2018-08-22 NOTE — PROGRESS NOTES
Orthopaedic Surgery - Progress Note  Fanta Robison (24 y o  female)   : 1990   MRN: [id number]  Date: 2018   Encounter: [id number]   Unit/Bed#: E5 -01    Assessment / Plan  Intractable Lumbar spine pain    · MRI showed no significant findings today, we did explain to the patient that there is not a surgical that she needed to fix her pain  · Continue with ice and analgesics as needed  · Continue to progress physical therapy with quick-draw back brace, and advance activity as tolerated  · Ortho will sign off, recommend follow-up with pain management as outpatient  Subjective  [de-identified] year old female with the retractable lumbar spine pain and some radicular pain on right leg  Patient feels that she has improved since yesterday and is able move around somewhat better the still has excruciating back pain when trying to move too much  She was fitted with the back quick-draw brace today which she feels helps  Vitals  Temp:  [98 1 °F (36 7 °C)-98 7 °F (37 1 °C)] 98 2 °F (36 8 °C)  HR:  [69-85] 78  Resp:  [18] 18  BP: (103-130)/(59-69) 130/59  Body mass index is 35 11 kg/m²  No intake/output data recorded  Lumbar Spine Exam  Alignment:  Normal lumbar alignment  Normal resting hip posture  Normal knee alignment  Normal ankle alignment  Inspection:  No swelling  No edema  No erythema  No deformity  Palpation:   mild  tenderness at Lumbar spine over the spinous processes, mild tenderness to palpation over the musculature on both sides of the spinous process and over the right gluteal area  ROM:  Patient is able to move around in the bed better today, but still limited by pain in her back  Her hip range of motion is intact but she feels weaker on movements of her right hip then with her left leg , Normal knee ROM  Normal ankle ROM    Strength:  Her hip strength was limited due to pain at this time, though slightly limited on the right due to pain I did feel that she had strength of 5/5 quadriceps and hamstrings  5/5 AT, GSC, PT, and peroneals  Tests:  (+) Straight leg raise on the right  Neurovascular:   sensation to the right lower leg is improved today she denies any numbness or tingling at this time  Toes warm and perfused  Gait:  Not tested  -MRI from 08/21/2018: Shows mild noncompressive lumbar degenerative change at L3-4, L4-5, and L5-S1  Small disc protrusions are seen without discrete nerve impingement        Lab Results  (I have personally reviewed pertinent lab results )    Results from last 7 days  Lab Units 08/20/18  1942   WBC Thousand/uL 12 34*   HEMOGLOBIN g/dL 13 2   HEMATOCRIT % 40 3   PLATELETS Thousands/uL 405*           Results from last 7 days  Lab Units 08/20/18 1942   SODIUM mmol/L 137   POTASSIUM mmol/L 4 4   CHLORIDE mmol/L 104   CO2 mmol/L 26   ANION GAP mmol/L 7   BUN mg/dL 8   CREATININE mg/dL 0 83   EGFR ml/min/1 73sq m 98   CALCIUM mg/dL 9 5   GLUCOSE RANDOM mg/dL 500 Parrish Chambers PA-C

## 2018-08-22 NOTE — PLAN OF CARE
Problem: OCCUPATIONAL THERAPY ADULT  Goal: Performs self-care activities at highest level of function for planned discharge setting  See evaluation for individualized goals  Treatment Interventions: ADL retraining, Functional transfer training, Endurance training, Patient/family training, Equipment evaluation/education, Compensatory technique education, Continued evaluation, Energy conservation, Activityengagement          See flowsheet documentation for full assessment, interventions and recommendations  Outcome: Progressing  Limitation: Decreased ADL status, Decreased endurance, Decreased self-care trans, Decreased high-level ADLs  Prognosis: Fair  Assessment: Pt seen for OT treatment session this PM focusing on functional activity tolerance, bed mobility, ADLs, and functional mobility/transfers  Pt alert and cooperative throughout session  Pt lying supine upon entering room  LB dressing completed while lying supine per pt's request, requiring overall Max A  Total A required to don B/L socks 2* decreased functional reach in supine position  Max A to don underwear 2* assist to thread BLEs into underwear and pt utilizing bridge technique to pull underwear over hips  Bed mobility completed with Min A of 2 for supine>sit 2* cues for logroll technique and assist for LE management and trunk control  UB dressing completed with Supervision to don/doff hospital gown while seated EOB 2* setup required and increased time to complete  Quick Draw brace donned while seated EOB with pt reporting relief from pain when donned  Transfers (sit<>stand, RW<>standard toilet) completed with Min A 2* assist to initiate forward weight shift from surface for sit>stand, assist for controlled descent to surface for stand>sit, and cues for sequencing and safe technique  Supervision only required for functional mobility 2* cues for safe use of RW   Toileting tasks completed with Max A 2* pt requiring assist for clothing management up/down 2* decreased dynamic standing balance and assist for thoroughness for perineal hygiene  Light grooming tasks completed with Supervision 2* setup required and increased time to complete  Pt returned to supine position at end of session with Min A using logroll technique 2* assist for LE management  Pt lying supine at end of session with call bell and phone within reach  All needs met and pt reports no further questions for OT at this time  Recommend home with family support at this time  OT to follow pt on caseload        OT Discharge Recommendation: Home with family support  OT - OK to Discharge:  (pending stair training with PT)

## 2018-08-22 NOTE — SOCIAL WORK
Met with pt who provided information for initial assessment  Pt stated she lives between her mother's place and her wife's places  Mother's apt is a 1st floor apt with 4 steps to enter  Wife's home is a multilevel home with a flight of steps to bedroom  Prior to admission, pt independent with ambulation and ADLs  No hx of STR  Reported she had VNA but unsure of which agency  PCP-Dr Laurel Garber  Discussed STR recommendations and pt is agreeable to rehab  Provided a list of facilities and pt will review it wit wife and get back to CM with choices  Pt would also like a referral to [facility] to be made  Referral made  Informed pt the difference between acute vs  Subacute rehabs  Will continue to follow for discharge planning needs

## 2018-08-22 NOTE — OCCUPATIONAL THERAPY NOTE
[address] Progress Note     Patient Name: Claudean Cheadle  Today's Date: 8/22/2018  Problem List  Patient Active Problem List   Diagnosis    Closed displaced fracture of acromial end of right clavicle with routine healing    Back pain    Tobacco use         08/22/18 1356   Restrictions/Precautions   Weight Bearing Precautions Per Order No   Braces or Orthoses Other (Comment)  (Quick Draw brace for comfort)   Other Precautions Fall Risk;Pain   Lifestyle   Autonomy At baseline, pt was I w/ ADLs, IADLs, and functional mobility/transfers w/o use of DME/AD, (+) , and reports 0 falls PTA   Reciprocal Relationships Lives with wife   Service to [employer] at [facility] [employer] time with family and friends   Pain Assessment   Pain Assessment 0-10   Pain Score 8   Pain Type Acute pain   Pain Location Hip   Pain Orientation Bilateral;Other (Comment)  (Increased pain in R hip)   Pain Frequency Constant/continuous   Pain Onset Ongoing   Clinical Progression Gradually improving   Effect of Pain on Daily Activities Increased pain with activity   Patient's Stated Pain Goal No pain   Hospital Pain Intervention(s) Repositioned; Ambulation/increased activity; Emotional support   Response to Interventions Tolerated OT   ADL   Grooming Assistance 5  Supervision/Setup   Grooming Deficit Setup;Supervision/safety; Increased time to complete   Grooming Comments Light grooming tasks completed with Supervision 2* setup required and increased time to complete  UB Dressing Assistance 5  Supervision/Setup   UB Dressing Deficit Setup;Supervision/safety; Increased time to complete   UB Dressing Comments UB dressing completed with Supervision to don/doff [facility] gown while seated EOB 2* setup required and increased time to complete  LB Dressing Assistance 2  Maximal Assistance   LB Dressing Deficit Setup;Verbal cueing;Supervision/safety; Increased time to complete; Don/doff R sock; Don/doff L sock; Thread RLE into underwear; Thread LLE into underwear;Pull up over hips   LB Dressing Comments LB dressing completed while lying supine per pt's request, requiring overall Max A  Total A required to don B/L socks 2* decreased functional reach in supine position  Max A to don underwear 2* assist to thread BLEs into underwear and pt utilizing bridge technique to pull underwear over hips  Toileting Assistance  2  Maximal Assistance   Toileting Deficit Setup;Steadying;Verbal cueing;Supervison/safety; Increased time to complete;Grab bar use;Clothing management up;Clothing management down;Perineal hygiene   Toileting Comments Toileting tasks completed with Max A 2* pt requiring assist for clothing management up/down 2* decreased dynamic standing balance and assist for thoroughness for perineal hygiene  Functional Standing Tolerance   Time 5 mins   Activity Functional mobility   Comments No LOB noted   Bed Mobility   Supine to Sit 4  Minimal assistance   Additional items Assist x 2;HOB elevated; Bedrails; Increased time required;Verbal cues;LE management  (logroll technique)   Sit to Supine 4  Minimal assistance   Additional items Assist x 1; Increased time required;Verbal cues;LE management  (logroll technique)   Additional Comments Pt lying supine at end of session with call bell and phone within reach  All needs met and pt reports no further questions for OT at this time  Transfers   Sit to Stand 4  Minimal assistance   Additional items Assist x 1; Increased time required;Verbal cues; Bedrails   Stand to Sit 4  Minimal assistance   Additional items Assist x 1; Increased time required;Verbal cues   Toilet transfer 4  Minimal assistance   Additional items Assist x 1; Increased time required;Verbal cues;Standard toilet  (grab bar)   Additional Comments Cues for safe technique, hand placement, and sequencing   Functional Mobility   Functional Mobility 5  Supervision   Additional Comments Assist x1   Additional items Rolling walker Toilet Transfers   Toilet Transfer From [facility] Type To and from   Toilet Transfer to [facility] [employer] Transfer Technique Stand pivot; Ambulating   Toilet Transfers Supervision   Toilet Transfers Comments Assist x1 with use of grab bar and mod cues for technique   Cognition   Overall Cognitive Status [facility]   Arousal/Participation Alert; Cooperative   Attention Within functional limits   Orientation Level Oriented X4   Memory Within functional limits   Following Commands Follows one step commands without difficulty   Activity Tolerance   Activity Tolerance Patient limited by fatigue;Patient limited by pain   Medical Staff Made Aware Pt appropriate to be seen per Adrain Heimlich, RN   Assessment   Assessment Pt seen for OT treatment session this PM focusing on functional activity tolerance, bed mobility, ADLs, and functional mobility/transfers  Pt alert and cooperative throughout session  Pt lying supine upon entering room  LB dressing completed while lying supine per pt's request, requiring overall Max A  Total A required to don B/L socks 2* decreased functional reach in supine position  Max A to don underwear 2* assist to thread BLEs into underwear and pt utilizing bridge technique to pull underwear over hips  Bed mobility completed with Min A of 2 for supine>sit 2* cues for logroll technique and assist for LE management and trunk control  UB dressing completed with Supervision to don/doff hospital go while seated EOB 2* setup required and increased time to complete  Quick Draw brace donned while seated EOB with pt reporting relief from pain when donned  Transfers (sit<>stand, RW<>standard toilet) completed with Min A 2* assist to initiate forward weight shift from surface for sit>stand, assist for controlled descent to surface for stand>sit, and cues for sequencing and safe technique  Supervision only required for functional mobility 2* cues for safe use of RW   Toileting tasks completed with Max A 2* pt requiring assist for clothing management up/down 2* decreased dynamic standing balance and assist for thoroughness for perineal hygiene  Light grooming tasks completed with Supervision 2* setup required and increased time to complete  Pt returned to supine position at end of session with Min A using logroll technique 2* assist for LE management  Pt lying supine at end of session with call bell and phone within reach  All needs met and pt reports no further questions for OT at this time  Recommend home with family support at this time  OT to follow pt on caseload  Plan   Treatment Interventions ADL retraining;Functional transfer training;UE strengthening/ROM; Endurance training;Patient/family training;Equipment evaluation/education; Compensatory technique education; Energy conservation; Activityengagement   Goal Expiration Date 09/04/18   Treatment Day 1   OT Frequency 3-5x/wk   Recommendation   OT Discharge Recommendation Home with family support   OT - OK to Discharge (pending stair training with PT)   Barthel Index   Feeding 10   Bathing 0   Grooming Score 5   Dressing Score 5   Bladder Score 10   Bowels Score 10   Toilet Use Score 5   Transfers (Bed/Chair) Score 10   Mobility (Level Surface) Score 0   Stairs Score 0   Barthel Index Score 55   Modified [place] Scale   Modified [place] Scale 3         Carmenza Blanco, OTR/L

## 2018-08-22 NOTE — PLAN OF CARE
Problem: PHYSICAL THERAPY ADULT  Goal: Performs mobility at highest level of function for planned discharge setting  See evaluation for individualized goals  Treatment/Interventions: Functional transfer training, LE strengthening/ROM, Therapeutic exercise, Endurance training, Patient/family training, Equipment eval/education, Bed mobility, Gait training, Spoke to nursing, OT, Family  Equipment Recommended:  (continue to assess)       See flowsheet documentation for full assessment, interventions and recommendations  Outcome: Progressing  Prognosis: Good  Problem List: Decreased strength, Decreased endurance, Impaired balance, Decreased mobility, Pain  Assessment: Pt agreeable to participate with therapy  Pt wanting to get up and go to the bathroom  Pt received supine in bed with wife present  Pt has orders from ortho for quick draw brace for comfort  Pt demonstrates rolling to the L minAx1, supine>sit log roll minAx2, sit<>stand minAx1 w/ to/from RW, gait training supervision w/ RW and VC for sequencing and technique, sit>supine minAx1 log roll  Pt educated on spinal precautions for comfort PRN  At end of treatment pt left supine in bed with quick draw brace donned, all needs in reach, call bell and phone in hand, RN aware, and no further questions or concerns for PT at this time  Pt needs to trial stairs prior to being safe to discharge to home environment  At this time would recommend d/c home w/ family support, RW, quick draw brace PRN, and outpt PT for back pain  Barriers to Discharge: Inaccessible home environment  Barriers to Discharge Comments: 4 NEYDA; multi-level home  Recommendation: Home with family support, Outpatient PT (outpt PT for back pain)     PT - OK to Discharge: No    See flowsheet documentation for full assessment         Comments: Bonny Sosa, PT,DPT

## 2018-08-22 NOTE — PLAN OF CARE
Problem: DISCHARGE PLANNING - CARE MANAGEMENT  Goal: Discharge to post-acute care or home with appropriate resources  INTERVENTIONS:  - Conduct assessment to determine patient/family and health care team treatment goals, and need for post-acute services based on payer coverage, community resources, and patient preferences, and barriers to discharge  - Address psychosocial, clinical, and financial barriers to discharge as identified in assessment in conjunction with the patient/family and health care team  - Arrange appropriate level of post-acute services according to patients   needs and preference and payer coverage in collaboration with the physician and health care team  - Communicate with and update the patient/family, physician, and health care team regarding progress on the discharge plan  - Arrange appropriate transportation to post-acute venues  Outcome: Progressing  -Discussed STR recommendations  List of facilities provided  Awaiting choices  Will continue to follow for discharge planning needs

## 2018-08-22 NOTE — PROGRESS NOTES
Progress Note - Cheikh Hess 24 y o  female MRN: [id number]    Unit/Bed#: E5 -01 Encounter: [id number]    Principal Problem:    Intractable Back pain  Active Problems:    Tobacco use  Ambulated dysfunction      Assessment/Plan:  1-intractable low back pain with radiculopathy--patient with difficulty turning as well as ambulating  MRI does not reveal evidence of any cord impingement  Patient is however a 2 person assist   PT recommends inpatient rehab  Continue pain control and physical therapy  Continue Medrol Dosepak      2-ongoing tobacco use-counseled with regards to cessation--patient agreeable to a nicotine patch     3-obesity-Lifestyle modification and weight reduction recommended  The patient has intractable low back pain needing IV pain medications and is a 2 person assist hence will change her status to inpatient and I expect a more than 2 midnight stay for this patient    Subjective:  Patient still with severe low back pain  Currently set to person assist   MRI reveals noncompressive lumbar degenerative changes L3-4 4 5 and S1  Small disc protrusion without significant nerve impingement  Patient will need short-term rehab  Continue current management  Physical Exam:   Vitals: Blood pressure 118/65, pulse 85, temperature 98 7 °F (37 1 °C), temperature source Temporal, resp  rate 18, weight 95 7 kg (211 lb), SpO2 97 %  ,Body mass index is 35 11 kg/m²  Gen:  Pleasant, non-tachypnic, non-dyspnic  Conversant  Heart: regular rate and rhythm, S1S2 present, no murmur, rub or gallop  Lungs: clear to ausculatation bilaterally  No wheezing, crackless, or rhonchi  No accessory muscle use or respiratory distress  Abd: soft, non-tender, non-distended  NABS, no guarding, rebound or peritoneal signs  Back- paraspinal muscle spasm  No neurological deficits noted  Extremities: no clubbing, cyanosis or edema  2+pedal pulses bilaterally  Full range of motion  Neuro: awake, alert and oriented  Cranial nerves 2-12 intact  Strength and sensation grossly intact  Skin: warm and dry: no petechiae, purpura and rash      LABS:     Results from last 7 days  Lab Units 08/20/18  1942   WBC Thousand/uL 12 34*   HEMOGLOBIN g/dL 13 2   HEMATOCRIT % 40 3   PLATELETS Thousands/uL 405*       Results from last 7 days  Lab Units 08/20/18  1942   SODIUM mmol/L 137   POTASSIUM mmol/L 4 4   CHLORIDE mmol/L 104   CO2 mmol/L 26   BUN mg/dL 8   CREATININE mg/dL 0 83   GLUCOSE RANDOM mg/dL 96   CALCIUM mg/dL 9 5     No intake or output data in the 24 hours ending 08/22/18 1234        Current Facility-Administered Medications:  acetaminophen 650 mg Oral Q6H PRN Bharati Pila, PA-RICHARD   aluminum-magnesium hydroxide-simethicone 30 mL Oral Q6H PRN Bharati Pila, LIV   bisacodyl 5 mg Oral Daily PRN Oziel Roberto MD   diazepam 5 mg Intravenous Q6H PRN Bharati PilLIV vazquez   enoxaparin 40 mg Subcutaneous Daily Bharati PilLIV vazquez   ketorolac 15 mg Intravenous Q6H PRN Oziel Roberto MD   lidocaine 1 patch Transdermal Daily Bharati Rouse PA-C   [START ON 8/23/2018] methylPREDNISolone 16 mg Oral Daily Oziel Roberto MD   Followed by       Wellington Higginbotham ON 8/24/2018] methylPREDNISolone 12 mg Oral Daily Oziel Roberto MD   Followed by       Wellington Higginbotham ON 8/25/2018] methylPREDNISolone 8 mg Oral Daily Oziel Roberto MD   Followed by       Wellington Higginbotham ON 8/26/2018] methylPREDNISolone 4 mg Oral Daily Oziel Roberto MD   naproxen 500 mg Oral Q12H Sakshi Gallegos MD   nicotine 14 mg Transdermal Daily Bharati PilLIV vazquez   ondansetron 4 mg Intravenous Q6H PRN Bharati Rouse PA-C

## 2018-08-23 VITALS
RESPIRATION RATE: 18 BRPM | WEIGHT: 211 LBS | OXYGEN SATURATION: 99 % | TEMPERATURE: 98 F | DIASTOLIC BLOOD PRESSURE: 68 MMHG | BODY MASS INDEX: 35.11 KG/M2 | HEART RATE: 80 BPM | SYSTOLIC BLOOD PRESSURE: 115 MMHG

## 2018-08-23 PROCEDURE — 97530 THERAPEUTIC ACTIVITIES: CPT

## 2018-08-23 PROCEDURE — 99232 SBSQ HOSP IP/OBS MODERATE 35: CPT | Performed by: HOSPITALIST

## 2018-08-23 PROCEDURE — 99239 HOSP IP/OBS DSCHRG MGMT >30: CPT | Performed by: HOSPITALIST

## 2018-08-23 PROCEDURE — 97116 GAIT TRAINING THERAPY: CPT

## 2018-08-23 RX ORDER — NICOTINE 21 MG/24HR
1 PATCH, TRANSDERMAL 24 HOURS TRANSDERMAL DAILY
Qty: 28 PATCH | Refills: 0 | Status: SHIPPED | OUTPATIENT
Start: 2018-08-24 | End: 2021-03-21

## 2018-08-23 RX ORDER — METHYLPREDNISOLONE 4 MG/1
4 TABLET ORAL DAILY
Qty: 1 TABLET | Refills: 0 | Status: SHIPPED | OUTPATIENT
Start: 2018-08-26 | End: 2018-08-27

## 2018-08-23 RX ORDER — CYCLOBENZAPRINE HCL 10 MG
10 TABLET ORAL 3 TIMES DAILY PRN
Status: DISCONTINUED | OUTPATIENT
Start: 2018-08-23 | End: 2018-08-23 | Stop reason: HOSPADM

## 2018-08-23 RX ORDER — ACETAMINOPHEN 325 MG/1
TABLET ORAL
Qty: 30 TABLET | Refills: 0
Start: 2018-08-23 | End: 2021-03-21

## 2018-08-23 RX ORDER — CYCLOBENZAPRINE HCL 10 MG
10 TABLET ORAL 3 TIMES DAILY PRN
Qty: 30 TABLET | Refills: 0 | Status: SHIPPED | OUTPATIENT
Start: 2018-08-23 | End: 2021-03-21

## 2018-08-23 RX ORDER — NAPROXEN 500 MG/1
500 TABLET ORAL EVERY 12 HOURS SCHEDULED
Qty: 30 TABLET | Refills: 0 | Status: SHIPPED | OUTPATIENT
Start: 2018-08-23 | End: 2021-03-21

## 2018-08-23 RX ORDER — METHYLPREDNISOLONE 8 MG/1
8 TABLET ORAL DAILY
Qty: 1 TABLET | Refills: 0 | Status: SHIPPED | OUTPATIENT
Start: 2018-08-25 | End: 2018-08-23 | Stop reason: HOSPADM

## 2018-08-23 RX ADMIN — NICOTINE 14 MG: 14 PATCH, EXTENDED RELEASE TRANSDERMAL at 08:05

## 2018-08-23 RX ADMIN — METHYLPREDNISOLONE 16 MG: 16 TABLET ORAL at 08:08

## 2018-08-23 RX ADMIN — LIDOCAINE 1 PATCH: 50 PATCH TOPICAL at 08:06

## 2018-08-23 RX ADMIN — Medication 5 MG: at 08:06

## 2018-08-23 RX ADMIN — CYCLOBENZAPRINE HYDROCHLORIDE 10 MG: 10 TABLET, FILM COATED ORAL at 15:51

## 2018-08-23 RX ADMIN — NAPROXEN 500 MG: 500 TABLET ORAL at 08:06

## 2018-08-23 RX ADMIN — ENOXAPARIN SODIUM 40 MG: 40 INJECTION SUBCUTANEOUS at 08:06

## 2018-08-23 NOTE — SOCIAL WORK
Cm received notice that patient is unable to afford medications at discharge due to no insurance  CM discussed this with Nithin and MD SUMNER price checked with [facility] list and follow-up with patient  Patient is able to afford medications through [facility]  CM faxed script for RW to [facility]; Patient received a RW at discharge  Patient is set up for Home Pt through [facility] Monday  No additional needs  CM to follow as needed

## 2018-08-23 NOTE — PROGRESS NOTES
Progress Note - Chanell Figueroa 24 y o  female MRN: [id number]    Unit/Bed#: E5 -01 Encounter: [id number]    Principal Problem:    Back pain  Active Problems:    Tobacco use      Assessment/Plan:  1-intractable low back pain with radiculopathy--patient presented with difficulty turning as well as ambulating  This is now slowly improved  Patient on a back brace which has helped  MRI does not reveal evidence of any cord impingement  Continue Medrol Dosepak  Continue ice  Will await Physical therapy recommendation  Was initially suggested STR-     2-ongoing tobacco use-counseled with regards to cessation--patient agreeable to a nicotine patch      3-obesity-Lifestyle modification and weight reduction recommended      PT recommends home with home PT  Subjective:  Patient states that use of a quick drop back brace has helped her pain somewhat however she is still having 4/10 pain on attempted ambulation  Will continue on physical therapy  No MRI does not reveal evidence of any compressive myelopathy  Recommended continuing physical therapy with brace in advance activity as tolerated  Disposition pending home with PT versus STR  Physical Exam:   Vitals: Blood pressure 102/69, pulse 75, temperature 98 °F (36 7 °C), temperature source Temporal, resp  rate 18, weight 95 7 kg (211 lb), SpO2 98 %  ,Body mass index is 35 11 kg/m²  Gen:  Pleasant, non-tachypnic, non-dyspnic  Conversant  Heart: regular rate and rhythm, S1S2 present, no murmur, rub or gallop  Lungs: clear to ausculatation bilaterally  No wheezing, crackless, or rhonchi  No accessory muscle use or respiratory distress  Abd: soft, non-tender, non-distended  NABS, no guarding, rebound or peritoneal signs  Extremities: no clubbing, cyanosis or edema  2+pedal pulses bilaterally  Full range of motion  Back-paraspinal muscle spasm  No EHL weakness  Neuro: awake, alert and oriented  Cranial nerves 2-12 intact    Strength and sensation grossly intact  Skin: warm and dry: no petechiae, purpura and rash      LABS:     Results from last 7 days  Lab Units 08/20/18 1942   WBC Thousand/uL 12 34*   HEMOGLOBIN g/dL 13 2   HEMATOCRIT % 40 3   PLATELETS Thousands/uL 405*       Results from last 7 days  Lab Units 08/20/18 1942   SODIUM mmol/L 137   POTASSIUM mmol/L 4 4   CHLORIDE mmol/L 104   CO2 mmol/L 26   BUN mg/dL 8   CREATININE mg/dL 0 83   GLUCOSE RANDOM mg/dL 96   CALCIUM mg/dL 9 5     No intake or output data in the 24 hours ending 08/23/18 0942        Current Facility-Administered Medications:  acetaminophen 650 mg Oral Q6H PRN Shayna Aguilar PA-C   aluminum-magnesium hydroxide-simethicone 30 mL Oral Q6H PRN Shayna Aguilar PA-C   bisacodyl 5 mg Oral Daily PRN Shade Up MD   diazepam 5 mg Intravenous Q6H PRN Shayna Aguilar PA-C   enoxaparin 40 mg Subcutaneous Daily Shayna Aguilar PA-C   lidocaine 1 patch Transdermal Daily Shayna Aguilar PA-C   [START ON 8/24/2018] methylPREDNISolone 12 mg Oral Daily Shade Up MD   Followed by       Dacia Lewis ON 8/25/2018] methylPREDNISolone 8 mg Oral Daily Shade Up MD   Followed by       Dacia Lewis ON 8/26/2018] methylPREDNISolone 4 mg Oral Daily Shade Up MD   naproxen 500 mg Oral Q12H Kathryn Humphries MD   nicotine 14 mg Transdermal Daily Shayna Aguilar PA-C   ondansetron 4 mg Intravenous Q6H PRN Shayna Aguilar PA-C       Family update- wife in the room-updated

## 2018-08-23 NOTE — PLAN OF CARE
Problem: PHYSICAL THERAPY ADULT  Goal: Performs mobility at highest level of function for planned discharge setting  See evaluation for individualized goals  Treatment/Interventions: Functional transfer training, LE strengthening/ROM, Therapeutic exercise, Endurance training, Patient/family training, Equipment eval/education, Bed mobility, Gait training, Spoke to nursing, OT, Family  Equipment Recommended:  (continue to assess)       See flowsheet documentation for full assessment, interventions and recommendations  Outcome: Progressing  Prognosis: Good  Problem List: Decreased strength, Decreased endurance, Decreased mobility, Pain, Impaired balance  Assessment: Pt resting in bed at time of PT session  Pt reports felling " better" at time of PT treatment session and is willing to participate in PT  Pt able to perform all bed mobility and transfers with less A required compared to previous session  Slight VC required for hand placement with transfers, however pt demonstrates good carryover of log roll technique in bed from previous session  Pt able to tolerate increased ambulation distances this session with S and the use of a RW  No gross LOB noted with gait training  Step through gait pattern observed  Pt attempted stair negotiation this session and pt was able to ascend 10 steps with the use of b/l hand trails and S  No LOB noted with stair training  Slight VC needed for proper gait sequence with stair negotiation  Pt assisted back to room via transport chair  Pt continues to require increased time to complete all mobility secondary to fatigue and pain  Pt positioned back into bed at conclusion of PT session with all needs within reach  Pt denies any further questions at this time and denies any mobility concerns about returning to home at this time  Pt denies any further questions at this time  PT will continue to follow  D/C recommendation when medically cleared is home PT      Barriers to Discharge: None  Barriers to Discharge Comments: 4 NEYDA; multi-level home  Recommendation: Home PT     PT - OK to Discharge: Yes (when medically cleared )    See flowsheet documentation for full assessment

## 2018-08-23 NOTE — PHYSICAL THERAPY NOTE
PHYSICAL THERAPY NOTE          Patient Name: Vince CABELLO Date: 8/23/2018 08/23/18 1540   Pain Assessment   Pain Assessment 0-10   Pain Score 5   Pain Type Acute pain   Pain Location Back   Pain Orientation Mid   Pain Descriptors Aching   Pain Frequency Constant/continuous   Pain Onset Ongoing   Clinical Progression Gradually improving   Effect of Pain on Daily Activities increased pain with activity    Patient's Stated Pain Goal No pain   Hospital Pain Intervention(s) Ambulation/increased activity;Repositioned   Response to Interventions tolerated    Restrictions/Precautions   Weight Bearing Precautions Per Order No   Braces or Orthoses Other (Comment)  (quick draw brace )   Other Precautions Fall Risk;Pain   General   Chart Reviewed Yes   Response to Previous Treatment Patient with no complaints from previous session  Family/Caregiver Present No   Cognition   Overall Cognitive Status WFL   Arousal/Participation Alert   Attention Within functional limits   Orientation Level Oriented to person;Oriented to place;Oriented to time   Memory Within functional limits   Following Commands Follows one step commands without difficulty   Subjective   Subjective Pt willing to participate in PT    Bed Mobility   Supine to Sit 5  Supervision   Additional items Increased time required;Verbal cues   Sit to Supine 5  Supervision   Additional items Increased time required;Verbal cues   Transfers   Sit to Stand 5  Supervision   Additional items Increased time required;Verbal cues   Stand to Sit 5  Supervision   Additional items Increased time required;Verbal cues   Additional Comments VC needed for hand placement and safety    Ambulation/Elevation   Gait pattern Short stride; Excessively slow; Foward flexed; Inconsistent libia   Gait Assistance 5  Supervision   Assistive Device Rolling walker   Distance 30ft x 2    Stair Management Assistance 5  Supervision   Additional items Assist x 1   Stair Management Technique Two rails; Nonreciprocal   Number of Stairs 10   Balance   Static Sitting Fair +   Static Standing Fair   Ambulatory Fair   Endurance Deficit   Endurance Deficit Yes   Endurance Deficit Description fatigue, pain    Activity Tolerance   Activity Tolerance Patient limited by fatigue;Patient limited by pain   Nurse Made Aware Pt appropriate to be seen and mobilize per Deep Mendoza RN   Assessment   Prognosis Good   Problem List Decreased strength;Decreased endurance;Decreased mobility;Pain; Impaired balance   Assessment Pt resting in bed at time of PT session  Pt reports felling " better" at time of PT treatment session and is willing to participate in PT  Pt able to perform all bed mobility and transfers with less A required compared to previous session  Slight VC required for hand placement with transfers, however pt demonstrates good carryover of log roll technique in bed from previous session  Pt able to tolerate increased ambulation distances this session with S and the use of a RW  No gross LOB noted with gait training  Step through gait pattern observed  Pt attempted stair negotiation this session and pt was able to ascend 10 steps with the use of b/l hand trails and S  No LOB noted with stair training  Slight VC needed for proper gait sequence with stair negotiation  Pt assisted back to room via transport chair  Pt continues to require increased time to complete all mobility secondary to fatigue and pain  Pt positioned back into bed at conclusion of PT session with all needs within reach  Pt denies any further questions at this time and denies any mobility concerns about returning to home at this time  Pt denies any further questions at this time  PT will continue to follow  D/C recommendation when medically cleared is home PT       Barriers to Discharge None   Goals   Patient Goals " to go home'   LTG Expiration Date 08/31/18 Treatment Day 2   Plan   Treatment/Interventions Functional transfer training;LE strengthening/ROM; Elevations; Therapeutic exercise; Endurance training;Patient/family training;Equipment eval/education; Bed mobility;Gait training;Spoke to nursing;Spoke to MD   Progress Progressing toward goals   PT Frequency Other (Comment)  (4-5x a week )   Recommendation   Recommendation Home PT   Equipment Recommended Walker  (RW)   PT - OK to Discharge Yes  (when medically cleared )   Yoana Flowers PT

## 2018-08-23 NOTE — DISCHARGE SUMMARY
Discharge Summary - Medical Izabel Anthony 24 y o  female MRN: [id number]    [facility] MRI [address]/E5 -* Encounter: [id number]    BRIEF OVERVIEW      Admission Date: 8/20/2018       Discharge Date:  08/23/2018      Admitting Diagnosis:  Intractable low back pain    Primary Discharge Diagnosis  Principal Problem:    Back pain intractable  Active Problems:    Tobacco use  Radiculopathy    Service:  [facility] Internal Medicine    Consulting Providers   Orthopedics--Dr Latonya Sullivan    Procedures Performed   MRI    LUMBAR DISC SPACES:     L1-L2:  Normal      L2-L3:  Normal      L3-L4:  Disc desiccation without loss of disc height  Slight annular bulging with a small central annular fissure  No canal stenosis, foraminal narrowing or discrete nerve impingement      L4-L5:  Slight disc desiccation without loss of disc height  Mild annular bulging with a tiny central disc protrusion  There is an annular fissure within the left paramedian aspect of the annulus  No significant canal stenosis, foraminal narrowing or   discrete nerve impingement      L5-S1:  Normal disc height and signal   Small focal left foraminal and lateral disc protrusion  There is no canal stenosis  Slight foraminal narrowing without discrete nerve impingement        Assessment and plan on date of discharge    1-intractable low back pain with radiculopathy--patient presented with difficulty turning as well as ambulating  This is now slowly improved  Patient recommended a back brace which has helped    MRI does not reveal evidence of any cord impingement    Continue Medrol Dosepak  Cyclobenzaprine   Continue ice  Will await Physical therapy recommendation    Was initially suggested STR-     2-ongoing tobacco use-counseled with regards to cessation--patient agreeable to a nicotine patch      3-obesity-Lifestyle modification and weight reduction recommended      PT recommends home with home PT     Discharge Condition: Improved    Ortho input  Assessment / Plan  Intractable Lumbar spine pain     · Continue with ice and analgesics as needed  · Continue to progress physical therapy with quick-draw back brace, and advance activity as tolerated  · recommend follow-up with pain management as outpatient      Discharge Disposition: Home/Self Care    Discharge summary:     Blanco Salter is a 28-year-old female who was admitted to [facility] on 08/20/2018 with intractable low back pain with pain radiating down the right leg  The inciting event was her picking up a 10 lb dog bowl when she heard a pop and notice lower back pain however the pain was bearable at that time and she was able to drive to her mother's place  She then took a nap  After she awoke and tried to get up she could not get out of the bed secondary to intractable pain  The pain was rated as 10 on 10 severity with radiation down the right leg  No sensory weakness  No bladder bowel incontinence  No neurological deficits were noted but she was unable to lift her right leg secondary to pain  An MRI of the lumbar spine was done and this revealed mild disc protrusions L4-5 with without evidence of any cord impingement  She was seen by Orthopedics who recommended a back brace which did help the pain somewhat however she still had some pain and stiffness climb being stairs  This has now improved somewhat and she has been cleared to go home with home PT  She has been recommended follow-up with a pain management specialist if the pain is not better in 3-4 weeks time  She remains stable for discharge  And will follow up with her PCP in a week's time        Discharge Medications   Please see Medical Reconciliation Discharge Form    Discharge Follow Up Appointments:   PCP    Discharge  Statement   Total Time Spent today including physical exam, discussion with patient and family, and discharge arrangements/care 37 minutes

## 2018-08-25 NOTE — PHYSICIAN ADVISOR
Current patient class: Inpatient  The patient is currently on Hospital Day: 4 at [facility]      The patient was admitted to the hospital at [phone] on 8/22/18 for the following diagnosis:  Back pain [M54 9]  Acute bilateral low back pain without sciatica [M54 5]       There is documentation in the medical record of an expected length of stay of at least 2 midnights  The patient is therefore expected to satisfy the 2 midnight benchmark and given the 2 midnight presumption is appropriate for INPATIENT ADMISSION  Given this expectation of a satisfying stay, CMS instructs us that the patient is most often appropriate for inpatient admission under part A provided medical necessity is documented in the chart  After review of the relevant documentation, labs, vital signs and test results, the patient is appropriate for INPATIENT ADMISSION  Admission to the hospital as an inpatient is a complex decision making process which requires the practitioner to consider the patients presenting complaint, history and physical examination and all relevant testing  With this in mind, in this case, the patient was deemed appropriate for INPATIENT ADMISSION  After review of the documentation and testing available at the time of the admission I concur with this clinical determination of medical necessity  Rationale is as follows: The patient is a 24 yrs old Female who presented to the ED at 8/20/2018  3:03 PM with a chief complaint of Back Pain (Patient was putting something in the trunk of her car this morning and felt something pull, has had back pain since; reports no relief with Tylenol; denies urinary complaints )     Given the need for further hospitalization, and along with the documentation of medical necessity present in the chart, the patient is appropriate for inpatient admission    The patient is expected to satisfy the 2 midnight benchmark, and will require further acute medical care  The patient does have comorbid conditions which increases the risk for significant adverse outcome  Given this the patient is appropriate for inpatient admission  The patients vitals on arrival were ED Triage Vitals [08/20/18 1508]   Temperature Pulse Respirations Blood Pressure SpO2   98 2 °F (36 8 °C) 87 18 118/77 100 %      Temp Source Heart Rate Source Patient Position - Orthostatic VS BP Location FiO2 (%)   Oral Monitor Lying Right arm --      Pain Score       Worst Possible Pain           Past Medical History:   Diagnosis Date    Asthma      Past Surgical History:   Procedure Laterality Date    CYST REMOVAL      SHOULDER SURGERY      R shoulder           Consults have been placed to:   IP CONSULT TO ORTHOPEDIC SURGERY    Vitals:    08/22/18 1548 08/22/18 2326 08/23/18 0700 08/23/18 1516   BP: 130/59 103/54 102/69 115/68   BP Location: Left arm Left arm Left arm Left arm   Pulse: 78 70 75 80   Resp: 18 18 18 18   Temp: 98 2 °F (36 8 °C) 97 8 °F (36 6 °C) 98 °F (36 7 °C) 98 °F (36 7 °C)   TempSrc: Temporal Temporal Temporal Temporal   SpO2: 97% 98% 98% 99%   Weight:           Most recent labs:    No results for input(s): WBC, HGB, HCT, PLT, K, NA, CALCIUM, BUN, CREATININE, LIPASE, AMYLASE, INR, TROPONINI, CKTOTAL, AST, ALT, ALKPHOS, BILITOT in the last 72 hours  Scheduled Meds:  Continuous Infusions:  No current facility-administered medications for this encounter  PRN Meds:      Surgical procedures (if appropriate):

## 2020-01-31 NOTE — PROGRESS NOTES
Daily Note     Today's date: 2018  Patient name: Rosa Banegas  : 1990  MRN: [id number]  Referring provider: Lourdes Melendrez MD  Dx:   Encounter Diagnosis     ICD-10-CM    1  Pain of right clavicle M89 8X1        Start Time: 930  Stop Time: 1010  Total time in clinic (min): 40 minutes    Subjective: Pt reports feeling ok today, states just stiffness in upper back         Objective: See treatment diary below    Precautions: none     Daily Treatment Diary      Manual                    Shoulder PROM                       Upper trap contract relax                        T/S PA [facility]                                                                       Exercise Diary      UBE       2'/2' 2'/2' 2'/2'  3'/3' 3'/3'  3'/3'  3'/3'   Pulleys  5'  5'  5'   5'  5'   5'   5'  5'   Upper trap stretch  3 x 30" ea  3 x 30"  30"x3   3x30"  3x30"           Doorway stretch  3 x 30"  3 x 30" 30"x3   3x30"  3x30"    3x30" 3x30" 3x30"   TB rows  OTB x 10 c 3" hold  OTB x 15 c 3" hold  OTB 3" x15    OTB 3" x15  BTB 20x3"    BTB 20x3"  Purple 20x3"  Purple 20x3"   TB ext  OTB x 10 c 3" hold  OTB x 15 c 3" hold  OTB 3"x15    OTB 3" x15  BTB 20x3"    BTB 20x3"  Purple 20x3"  Purple 20x3"   TB ER  NV   OTB x15  OTB  x15    OTB 3" x15  OTB 20x3"   OTB 20x3" Green 20x3"  Green 20x3"   Shoulder AROM (pain free, to 90 deg @ mirror) Scaption/ABD  NV  NV  3"  x10 ea   10x3"  15x ea           D1 flex/ext TB  YTB x 10 ea  OTB x 10  OTB x15    OTB x15  OTB 20x           D2 flex/ext TB  YTB x 10 ea OTB x 10  OTB x15    OTB x15  OTB 20x           Chin tuck c biofeedback  seated x 10 c 10" hold(progress NV)  hooklying x 10 to yellow, x 10 to purple  h/l  10" x10   @20  mmHg  10" x 10 @20, to purp/org ea 10" x 10 @20, to purp/org ea  10x10", 20 --> purple            TB IR              GTB  20  GTB 20x  GTB 20x Green 20x3"    body blade              flex 2 x 30" cx favorable for labor no further leaking forebag palpable  GBS negative  mirena number given  Kick counts baby active   flex 2 x 30" Flex 2x30" Flex 2x30"    scap stabilization c med ball 30" ea               Red 30' ea Red 30" ea Red 30x ea   BTB strap               3x30" 3x30" 3x30"     TB BTB raise              YTB  X 20  YTB  20x ORG 20x  ORG 20x                                                                                                                                 Modalities   5/30 6/11                   CP PRN  10' post  10' post                                                                        Assessment: Tolerated treatment well  Patient demonstrated fatigue post treatment and would benefit from continued PT  Pt shows increased function and decreased symptoms post PT performed mobilizations  Pt will benefit from further skilled PT to increase strength, flexibility and function  Continue to progress as able  Plan: Progress treatment as tolerated  Continue scap stabilization and RTC strength

## 2021-03-20 ENCOUNTER — HOSPITAL ENCOUNTER (EMERGENCY)
Facility: HOSPITAL | Age: 31
Discharge: HOME | End: 2021-03-20
Attending: EMERGENCY MEDICINE | Admitting: EMERGENCY MEDICINE
Payer: COMMERCIAL

## 2021-03-20 ENCOUNTER — APPOINTMENT (EMERGENCY)
Dept: RADIOLOGY | Facility: HOSPITAL | Age: 31
End: 2021-03-20
Payer: COMMERCIAL

## 2021-03-20 VITALS
TEMPERATURE: 97.9 F | OXYGEN SATURATION: 100 % | WEIGHT: 150 LBS | HEART RATE: 65 BPM | DIASTOLIC BLOOD PRESSURE: 70 MMHG | SYSTOLIC BLOOD PRESSURE: 120 MMHG | HEIGHT: 65 IN | BODY MASS INDEX: 24.99 KG/M2 | RESPIRATION RATE: 20 BRPM

## 2021-03-20 DIAGNOSIS — Z87.09 HISTORY OF ASTHMA: ICD-10-CM

## 2021-03-20 DIAGNOSIS — R11.2 NAUSEA & VOMITING: Primary | ICD-10-CM

## 2021-03-20 DIAGNOSIS — K44.9 HIATAL HERNIA: ICD-10-CM

## 2021-03-20 LAB
ALBUMIN SERPL BCP-MCNC: 4.5 G/DL (ref 3.5–5)
ALP SERPL-CCNC: 61 U/L (ref 46–116)
ALT SERPL W P-5'-P-CCNC: 28 U/L (ref 12–78)
ANION GAP SERPL CALCULATED.3IONS-SCNC: 5 MMOL/L (ref 4–13)
AST SERPL W P-5'-P-CCNC: 13 U/L (ref 5–45)
BASOPHILS # BLD AUTO: 0.06 THOUSANDS/ΜL (ref 0–0.1)
BASOPHILS NFR BLD AUTO: 1 % (ref 0–1)
BILIRUB SERPL-MCNC: 0.55 MG/DL (ref 0.2–1)
BUN SERPL-MCNC: 16 MG/DL (ref 5–25)
CALCIUM SERPL-MCNC: 9.5 MG/DL (ref 8.3–10.1)
CHLORIDE SERPL-SCNC: 106 MMOL/L (ref 100–108)
CO2 SERPL-SCNC: 28 MMOL/L (ref 21–32)
CREAT SERPL-MCNC: 0.92 MG/DL (ref 0.6–1.3)
EOSINOPHIL # BLD AUTO: 0.02 THOUSAND/ΜL (ref 0–0.61)
EOSINOPHIL NFR BLD AUTO: 0 % (ref 0–6)
ERYTHROCYTE [DISTWIDTH] IN BLOOD BY AUTOMATED COUNT: 13.8 % (ref 11.6–15.1)
GFR SERPL CREATININE-BSD FRML MDRD: 85 ML/MIN/1.73SQ M
GLUCOSE SERPL-MCNC: 111 MG/DL (ref 65–140)
HCG SERPL QL: NEGATIVE
HCT VFR BLD AUTO: 41.3 % (ref 34.8–46.1)
HGB BLD-MCNC: 13.5 G/DL (ref 11.5–15.4)
IMM GRANULOCYTES # BLD AUTO: 0.05 THOUSAND/UL (ref 0–0.2)
IMM GRANULOCYTES NFR BLD AUTO: 0 % (ref 0–2)
LIPASE SERPL-CCNC: 77 U/L (ref 73–393)
LYMPHOCYTES # BLD AUTO: 2.69 THOUSANDS/ΜL (ref 0.6–4.47)
LYMPHOCYTES NFR BLD AUTO: 20 % (ref 14–44)
MCH RBC QN AUTO: 28.5 PG (ref 26.8–34.3)
MCHC RBC AUTO-ENTMCNC: 32.7 G/DL (ref 31.4–37.4)
MCV RBC AUTO: 87 FL (ref 82–98)
MONOCYTES # BLD AUTO: 1.38 THOUSAND/ΜL (ref 0.17–1.22)
MONOCYTES NFR BLD AUTO: 10 % (ref 4–12)
NEUTROPHILS # BLD AUTO: 9.05 THOUSANDS/ΜL (ref 1.85–7.62)
NEUTS SEG NFR BLD AUTO: 69 % (ref 43–75)
NRBC BLD AUTO-RTO: 0 /100 WBCS
PLATELET # BLD AUTO: 324 THOUSANDS/UL (ref 149–390)
PMV BLD AUTO: 9.9 FL (ref 8.9–12.7)
POTASSIUM SERPL-SCNC: 3.5 MMOL/L (ref 3.5–5.3)
PROT SERPL-MCNC: 8.2 G/DL (ref 6.4–8.2)
RBC # BLD AUTO: 4.73 MILLION/UL (ref 3.81–5.12)
SODIUM SERPL-SCNC: 139 MMOL/L (ref 136–145)
WBC # BLD AUTO: 13.25 THOUSAND/UL (ref 4.31–10.16)

## 2021-03-20 PROCEDURE — 84703 CHORIONIC GONADOTROPIN ASSAY: CPT | Performed by: EMERGENCY MEDICINE

## 2021-03-20 PROCEDURE — 96374 THER/PROPH/DIAG INJ IV PUSH: CPT

## 2021-03-20 PROCEDURE — 96361 HYDRATE IV INFUSION ADD-ON: CPT

## 2021-03-20 PROCEDURE — 99284 EMERGENCY DEPT VISIT MOD MDM: CPT

## 2021-03-20 PROCEDURE — 74177 CT ABD & PELVIS W/CONTRAST: CPT

## 2021-03-20 PROCEDURE — 80053 COMPREHEN METABOLIC PANEL: CPT | Performed by: EMERGENCY MEDICINE

## 2021-03-20 PROCEDURE — 85025 COMPLETE CBC W/AUTO DIFF WBC: CPT | Performed by: EMERGENCY MEDICINE

## 2021-03-20 PROCEDURE — 96375 TX/PRO/DX INJ NEW DRUG ADDON: CPT

## 2021-03-20 PROCEDURE — 36415 COLL VENOUS BLD VENIPUNCTURE: CPT | Performed by: EMERGENCY MEDICINE

## 2021-03-20 PROCEDURE — 76705 ECHO EXAM OF ABDOMEN: CPT | Performed by: EMERGENCY MEDICINE

## 2021-03-20 PROCEDURE — 83690 ASSAY OF LIPASE: CPT | Performed by: EMERGENCY MEDICINE

## 2021-03-20 PROCEDURE — 99284 EMERGENCY DEPT VISIT MOD MDM: CPT | Performed by: EMERGENCY MEDICINE

## 2021-03-20 PROCEDURE — G1004 CDSM NDSC: HCPCS

## 2021-03-20 RX ORDER — METOCLOPRAMIDE 10 MG/1
10 TABLET ORAL EVERY 6 HOURS
Qty: 20 TABLET | Refills: 0 | Status: SHIPPED | OUTPATIENT
Start: 2021-03-20 | End: 2021-03-21

## 2021-03-20 RX ORDER — FAMOTIDINE 20 MG/1
20 TABLET, FILM COATED ORAL 2 TIMES DAILY
Qty: 30 TABLET | Refills: 0 | Status: SHIPPED | OUTPATIENT
Start: 2021-03-20 | End: 2021-03-21

## 2021-03-20 RX ORDER — ONDANSETRON 2 MG/ML
4 INJECTION INTRAMUSCULAR; INTRAVENOUS ONCE
Status: COMPLETED | OUTPATIENT
Start: 2021-03-20 | End: 2021-03-20

## 2021-03-20 RX ORDER — METOCLOPRAMIDE HYDROCHLORIDE 5 MG/ML
10 INJECTION INTRAMUSCULAR; INTRAVENOUS ONCE
Status: COMPLETED | OUTPATIENT
Start: 2021-03-20 | End: 2021-03-20

## 2021-03-20 RX ADMIN — SODIUM CHLORIDE 1000 ML: 0.9 INJECTION, SOLUTION INTRAVENOUS at 04:39

## 2021-03-20 RX ADMIN — ONDANSETRON 4 MG: 2 INJECTION INTRAMUSCULAR; INTRAVENOUS at 04:39

## 2021-03-20 RX ADMIN — IOHEXOL 100 ML: 350 INJECTION, SOLUTION INTRAVENOUS at 07:02

## 2021-03-20 RX ADMIN — METOCLOPRAMIDE 10 MG: 5 INJECTION, SOLUTION INTRAMUSCULAR; INTRAVENOUS at 06:01

## 2021-03-20 NOTE — ED ATTENDING ATTESTATION
3/20/2021  INaomie MD, saw and evaluated the patient  I have discussed the patient with the resident/non-physician practitioner and agree with the resident's/non-physician practitioner's findings, Plan of Care, and MDM as documented in the resident's/non-physician practitioner's note, except where noted  All available labs and Radiology studies were reviewed  I was present for key portions of any procedure(s) performed by the resident/non-physician practitioner and I was immediately available to provide assistance  At this point I agree with the current assessment done in the Emergency Department  I have conducted an independent evaluation of this patient a history and physical is as follows: This is a 68-year-old female who presents to the emergency department with abdominal pain and vomiting  Patient states the symptoms started 2 days ago  States that this started suddenly, and have been persistent and unchanged since  She states that she cannot get comfortable  She cannot keep anything down  Patient states that she is vomiting food as well as some yellow bile  The patient has not had diarrhea, states she is not passing gas either  No history of intra-abdominal surgeries  No chest pain or shortness of breath  Pain is described as constant, aching, and in her epigastrium  Does not radiate  Patient does not have urinary symptoms  Does not have vaginal discharge  Patient has had an episode like this in the past 2 years ago, which resolved on its own  Review of systems otherwise negative in 12 systems reviewed  On exam the patient appears uncomfortable  Vital signs were reviewed  Patient is awake, alert, interactive  The patient's pupils are equally round reactive to light  Oropharynx is clear with moist mucous membranes  Neck is supple and nontender with no adenopathy or JVD  Heart is regular with no murmurs, rubs, or gallops    Lungs are clear and equal with no wheezes, rales, or rhonchi  Abdomen is soft with mild epigastric tenderness with no masses, rebound, guarding  There is no CVA tenderness  The patient was completely exposed  There is no skin breakdown  There are no rashes or skin changes  Extremities are warm and well perfused with good pulses  The patient has normal strength, sensation, and cranial nerves  Impression:  Abdominal pain, vomiting  IV access established  Will plan to give antiemetics, pain medications, check labs, ultrasound to assess gallbladder    If patient has no resolution of symptoms, will CT to rule out obstructive pathology  ED Course         Critical Care Time  Procedures

## 2021-03-20 NOTE — ED NOTES
PT rang call bell to inform us she was vomiting  ADELSO Grady notified       Merlinda Coupe  03/20/21 1472

## 2021-03-20 NOTE — ED PROVIDER NOTES
History  Chief Complaint   Patient presents with    Vomiting     patient c/o vomiting x 2 days, also reports upper abdominal pain  Denies fever     68-year-old female without significant past medical history presenting for evaluation of vomiting for the past 2 days  In addition, she reports upper epigastric abdominal tightness/discomfort associated with vomiting episodes  She is unable to count how many episodes of vomiting she has had  Has had difficulty even keeping water down but has been trying to stay hydrated  Hasn't had any associated diarrhea  Does state that she hasn't passed flatus in 3 days and hasn't had a BM in 3 days  Does not report any history of similar  No sick contacts  Denies any concerns about covid-19, no respiratory symptoms, no fevers or chills  ROS otherwise neg as below  History provided by:  Patient   used: No        None       Past Medical History:   Diagnosis Date    Asthma        Past Surgical History:   Procedure Laterality Date    CYST REMOVAL      SHOULDER SURGERY      R shoulder    TONSILLECTOMY         History reviewed  No pertinent family history  I have reviewed and agree with the history as documented  E-Cigarette/Vaping     E-Cigarette/Vaping Substances     Social History     Tobacco Use    Smoking status: Current Every Day Smoker     Packs/day: 1 00    Smokeless tobacco: Never Used   Substance Use Topics    Alcohol use: Not Currently     Frequency: Never     Drinks per session: Patient refused     Binge frequency: Never     Comment: socailly    Drug use: Yes     Types: Marijuana        Review of Systems   Constitutional: Negative for chills, fatigue and fever  HENT: Negative for sore throat  Eyes: Negative for redness and visual disturbance  Respiratory: Negative for cough, shortness of breath and wheezing  Cardiovascular: Negative for chest pain     Gastrointestinal: Positive for abdominal pain, constipation, nausea and vomiting  Negative for diarrhea  Genitourinary: Negative for difficulty urinating, dysuria and menstrual problem  Musculoskeletal: Negative for back pain and myalgias  Skin: Negative for pallor and rash  Neurological: Negative for syncope, weakness, light-headedness and headaches  Psychiatric/Behavioral: Negative for confusion  The patient is not nervous/anxious  Physical Exam  ED Triage Vitals   Temperature Pulse Respirations Blood Pressure SpO2   03/20/21 0414 03/20/21 0414 03/20/21 0414 03/20/21 0414 03/20/21 0414   97 9 °F (36 6 °C) 61 20 120/69 100 %      Temp Source Heart Rate Source Patient Position - Orthostatic VS BP Location FiO2 (%)   03/20/21 0414 03/20/21 0414 03/20/21 0628 03/20/21 0628 --   Oral Monitor Lying Right arm       Pain Score       03/20/21 0414       Worst Possible Pain             Orthostatic Vital Signs  Vitals:    03/20/21 0414 03/20/21 0628   BP: 120/69 120/70   Pulse: 61 65   Patient Position - Orthostatic VS:  Lying       Physical Exam  Vitals signs and nursing note reviewed  Constitutional:       General: She is not in acute distress  Appearance: Normal appearance  She is not ill-appearing or diaphoretic  HENT:      Head: Normocephalic and atraumatic  Right Ear: External ear normal       Left Ear: External ear normal       Nose: Nose normal       Mouth/Throat:      Mouth: Mucous membranes are moist       Pharynx: Oropharynx is clear  Eyes:      Conjunctiva/sclera: Conjunctivae normal       Pupils: Pupils are equal, round, and reactive to light  Neck:      Musculoskeletal: Normal range of motion  Cardiovascular:      Rate and Rhythm: Normal rate and regular rhythm  Pulmonary:      Effort: Pulmonary effort is normal  No respiratory distress  Breath sounds: No wheezing or rales  Abdominal:      General: Abdomen is flat  There is no distension  Palpations: Abdomen is soft  There is no mass  Tenderness:  There is abdominal tenderness (epigastrium and LUQ)  Hernia: No hernia is present  Musculoskeletal:      Right lower leg: No edema  Left lower leg: No edema  Skin:     General: Skin is warm and dry  Neurological:      General: No focal deficit present  Mental Status: She is alert     Psychiatric:         Mood and Affect: Mood normal          ED Medications  Medications   ondansetron (ZOFRAN) injection 4 mg (4 mg Intravenous Given 3/20/21 0439)   sodium chloride 0 9 % bolus 1,000 mL (0 mL Intravenous Stopped 3/20/21 0649)   metoclopramide (REGLAN) injection 10 mg (10 mg Intravenous Given 3/20/21 0601)   iohexol (OMNIPAQUE) 350 MG/ML injection (MULTI-DOSE) 100 mL (100 mL Intravenous Given 3/20/21 0702)       Diagnostic Studies  Results Reviewed     Procedure Component Value Units Date/Time    hCG, qualitative pregnancy [[id number]]  (Normal) Collected: 03/20/21 0438    Lab Status: Final result Specimen: Blood from Arm, Right Updated: 03/20/21 0511     Preg, Serum Negative    Comprehensive metabolic panel [[id number]] Collected: 03/20/21 0438    Lab Status: Final result Specimen: Blood from Arm, Right Updated: 03/20/21 0510     Sodium 139 mmol/L      Potassium 3 5 mmol/L      Chloride 106 mmol/L      CO2 28 mmol/L      ANION GAP 5 mmol/L      BUN 16 mg/dL      Creatinine 0 92 mg/dL      Glucose 111 mg/dL      Calcium 9 5 mg/dL      AST 13 U/L      ALT 28 U/L      Alkaline Phosphatase 61 U/L      Total Protein 8 2 g/dL      Albumin 4 5 g/dL      Total Bilirubin 0 55 mg/dL      eGFR 85 ml/min/1 73sq m     Narrative:      [place] guidelines for Chronic Kidney Disease (CKD):     Stage 1 with normal or high GFR (GFR > 90 mL/min/1 73 square meters)    Stage 2 Mild CKD (GFR = 60-89 mL/min/1 73 square meters)    Stage 3A Moderate CKD (GFR = 45-59 mL/min/1 73 square meters)    Stage 3B Moderate CKD (GFR = 30-44 mL/min/1 73 square meters)    Stage 4 Severe CKD (GFR = 15-29 mL/min/1 73 square meters)    Stage 5 End Stage CKD (GFR <15 mL/min/1 73 square meters)  Note: GFR calculation is accurate only with a steady state creatinine    Lipase [[id number]]  (Normal) Collected: 03/20/21 0438    Lab Status: Final result Specimen: Blood from Arm, Right Updated: 03/20/21 0510     Lipase 77 u/L     CBC and differential [[id number]]  (Abnormal) Collected: 03/20/21 0438    Lab Status: Final result Specimen: Blood from Arm, Right Updated: 03/20/21 0450     WBC 13 25 Thousand/uL      RBC 4 73 Million/uL      Hemoglobin 13 5 g/dL      Hematocrit 41 3 %      MCV 87 fL      MCH 28 5 pg      MCHC 32 7 g/dL      RDW 13 8 %      MPV 9 9 fL      Platelets 302 Thousands/uL      nRBC 0 /100 WBCs      Neutrophils Relative 69 %      Immat GRANS % 0 %      Lymphocytes Relative 20 %      Monocytes Relative 10 %      Eosinophils Relative 0 %      Basophils Relative 1 %      Neutrophils Absolute 9 05 Thousands/µL      Immature Grans Absolute 0 05 Thousand/uL      Lymphocytes Absolute 2 69 Thousands/µL      Monocytes Absolute 1 38 Thousand/µL      Eosinophils Absolute 0 02 Thousand/µL      Basophils Absolute 0 06 Thousands/µL                  CT abdomen pelvis with contrast   Final Result by Shaun Munoz DO (03/20 1280)   Incidental findings as described above  No acute CT abnormality to account for the patient's abdominal symptoms  If clinically warranted, follow-up GI and/or general surgery consultation is recommended              Workstation performed: [id number]               Procedures  POC Biliary US    Date/Time: 3/20/2021 6:25 AM  Performed by: Jay Jay Shah MD  Authorized by: Jay Jay Shah MD     Patient location:  ED  Performed by:  Resident  Other Assisting Provider: Yes (comment) (Dr Flor Nava)    Procedure details:     Exam Type:  Diagnostic    Indications: epigastric pain      Assessment for:  Cholecystitis and cholelithiasis    Views obtained: gallbladder (transverse and longitudinal) and common bile duct      Image quality: limited diagnostic      Image availability:  Images available in PACS  Findings:     Cholelithiasis: not identified      Common bile duct:  Unable to visualize    Gallbladder wall:  Normal    Pericholecystic fluid: not identified      Sonographic Medina's sign: negative    Interpretation:     Biliary ultrasound impressions: normal gallbladder ultrasound    Comments:      Normal           ED Course                             SBIRT 22yo+      Most Recent Value   SBIRT (24 yo +)   In order to provide better care to our patients, we are screening all of our patients for alcohol and drug use  Would it be okay to ask you these screening questions? Unable to answer at this time Filed at: 03/20/2021 0727                [facility]  Number of Diagnoses or Management Options  Hiatal hernia:   History of asthma:   Nausea & vomiting:   Diagnosis management comments: 30 yo F presenting for epigastric pain and vomiting with associated constipation  Patient requiring multiple doses of anti-emetics in the ED with ongoing nausea  Labs unremarkable  Bedside US does not show any acute pathology in the RUQ  Given patient's ongoing symptoms and stating that she has not had any flatus in 3 days, will scan for obstruction  At time of sign out patient pending imaging studies  Expect she will be stable for discharge if symptoms are well controlled         Disposition  Final diagnoses:   Nausea & vomiting   Hiatal hernia   History of asthma     Time reflects when diagnosis was documented in both MDM as applicable and the Disposition within this note     Time User Action Codes Description Comment    3/20/2021  7:32 AM Clarance Asper T Add [R11 2] Nausea & vomiting     3/20/2021  7:33 AM Clarance Asper T Add [K44 9] Hiatal hernia     3/20/2021  7:37 AM Shoshana Whittaker Add [Z87 09] History of asthma       ED Disposition     ED Disposition Condition Date/Time Comment    Discharge Stable Sat Mar 20, 2021  7:31 AM Delaney Malik discharge to home/self care  Follow-up Information     Follow up With Specialties Details Why Contact Info Additional [facility] Schedule an appointment as soon as possible for a visit  For follow up regarding your symptoms [address] [facility] [address] [address] [phone] Sh[facility], [address], [place], [place], [address]    Eugenio Camarillo MD Family Medicine Schedule an appointment as soon as possible for a visit  For follow up regarding your symptoms [address]  [address]        [address] [facility] [address] [facility] Emergency Medicine   [address] [id number]  [address] [facility] [address] [facility], [address] [facility] [address], [place], [place], [address]   [phone]          Discharge Medication List as of 3/20/2021  7:38 AM      START taking these medications    Details   famotidine (PEPCID) 20 mg tablet Take 1 tablet (20 mg total) by mouth 2 (two) times a day, Starting Sat 3/20/2021, Print      metoclopramide (REGLAN) 10 mg tablet Take 1 tablet (10 mg total) by mouth every 6 (six) hours for 5 days, Starting Sat 3/20/2021, Until Thu 3/25/2021, Print         CONTINUE these medications which have NOT CHANGED    Details   acetaminophen (TYLENOL) 325 mg tablet 650 mg q 6 hours p r n  pain/fever, No Print      cyclobenzaprine (FLEXERIL) 10 mg tablet Take 1 tablet (10 mg total) by mouth 3 (three) times a day as needed for muscle spasms, Starting Thu 8/23/2018, Print      naproxen (NAPROSYN) 500 mg tablet Take 1 tablet (500 mg total) by mouth every 12 (twelve) hours, Starting Thu 8/23/2018, Print      nicotine (NICODERM CQ) 14 mg/24hr TD 24 hr patch Place 1 patch on the skin daily, Starting Fri 8/24/2018, Print           No discharge procedures on file      PDMP Review None           ED Provider  Attending physically available and evaluated Delaney Malik I managed the patient along with the ED Attending      Electronically Signed by         Yuridia Ward MD  03/23/21 2082

## 2021-03-21 ENCOUNTER — APPOINTMENT (EMERGENCY)
Dept: ULTRASOUND IMAGING | Facility: HOSPITAL | Age: 31
End: 2021-03-21
Payer: COMMERCIAL

## 2021-03-21 ENCOUNTER — HOSPITAL ENCOUNTER (OUTPATIENT)
Facility: HOSPITAL | Age: 31
Setting detail: OBSERVATION
Discharge: HOME | End: 2021-03-23
Attending: EMERGENCY MEDICINE | Admitting: INTERNAL MEDICINE
Payer: COMMERCIAL

## 2021-03-21 DIAGNOSIS — I49.3 VENTRICULAR ECTOPY: ICD-10-CM

## 2021-03-21 DIAGNOSIS — R00.1 BRADYCARDIA: ICD-10-CM

## 2021-03-21 DIAGNOSIS — R10.13 EPIGASTRIC ABDOMINAL PAIN: ICD-10-CM

## 2021-03-21 DIAGNOSIS — R10.9 INTRACTABLE ABDOMINAL PAIN: ICD-10-CM

## 2021-03-21 DIAGNOSIS — D72.829 LEUKOCYTOSIS, UNSPECIFIED TYPE: ICD-10-CM

## 2021-03-21 DIAGNOSIS — R11.2 NAUSEA AND VOMITING: Primary | ICD-10-CM

## 2021-03-21 DIAGNOSIS — E87.6 HYPOKALEMIA: ICD-10-CM

## 2021-03-21 DIAGNOSIS — R11.2 INTRACTABLE NAUSEA AND VOMITING: ICD-10-CM

## 2021-03-21 LAB
ALBUMIN SERPL BCP-MCNC: 4.7 G/DL (ref 3.5–5)
ALP SERPL-CCNC: 63 U/L (ref 46–116)
ALT SERPL W P-5'-P-CCNC: 29 U/L (ref 12–78)
ANION GAP SERPL CALCULATED.3IONS-SCNC: 9 MMOL/L (ref 4–13)
AST SERPL W P-5'-P-CCNC: 12 U/L (ref 5–45)
BASOPHILS # BLD AUTO: 0.08 THOUSANDS/ΜL (ref 0–0.1)
BASOPHILS NFR BLD AUTO: 1 % (ref 0–1)
BILIRUB SERPL-MCNC: 0.6 MG/DL (ref 0.2–1)
BILIRUB UR QL STRIP: NEGATIVE
BUN SERPL-MCNC: 11 MG/DL (ref 5–25)
CALCIUM SERPL-MCNC: 9.5 MG/DL (ref 8.3–10.1)
CHLORIDE SERPL-SCNC: 101 MMOL/L (ref 100–108)
CLARITY UR: CLEAR
CO2 SERPL-SCNC: 30 MMOL/L (ref 21–32)
COLOR UR: YELLOW
CREAT SERPL-MCNC: 0.85 MG/DL (ref 0.6–1.3)
EOSINOPHIL # BLD AUTO: 0.01 THOUSAND/ΜL (ref 0–0.61)
EOSINOPHIL NFR BLD AUTO: 0 % (ref 0–6)
ERYTHROCYTE [DISTWIDTH] IN BLOOD BY AUTOMATED COUNT: 13.2 % (ref 11.6–15.1)
EXT PREG TEST URINE: NEGATIVE
EXT. CONTROL ED NAV: NORMAL
FLUAV RNA RESP QL NAA+PROBE: NEGATIVE
FLUBV RNA RESP QL NAA+PROBE: NEGATIVE
GFR SERPL CREATININE-BSD FRML MDRD: 94 ML/MIN/1.73SQ M
GLUCOSE SERPL-MCNC: 110 MG/DL (ref 65–140)
GLUCOSE UR STRIP-MCNC: NEGATIVE MG/DL
HCT VFR BLD AUTO: 40.7 % (ref 34.8–46.1)
HGB BLD-MCNC: 13.5 G/DL (ref 11.5–15.4)
HGB UR QL STRIP.AUTO: NEGATIVE
IMM GRANULOCYTES # BLD AUTO: 0.04 THOUSAND/UL (ref 0–0.2)
IMM GRANULOCYTES NFR BLD AUTO: 0 % (ref 0–2)
KETONES UR STRIP-MCNC: ABNORMAL MG/DL
LEUKOCYTE ESTERASE UR QL STRIP: NEGATIVE
LIPASE SERPL-CCNC: 90 U/L (ref 73–393)
LYMPHOCYTES # BLD AUTO: 2.19 THOUSANDS/ΜL (ref 0.6–4.47)
LYMPHOCYTES NFR BLD AUTO: 20 % (ref 14–44)
MAGNESIUM SERPL-MCNC: 2.1 MG/DL (ref 1.6–2.6)
MCH RBC QN AUTO: 28.7 PG (ref 26.8–34.3)
MCHC RBC AUTO-ENTMCNC: 33.2 G/DL (ref 31.4–37.4)
MCV RBC AUTO: 86 FL (ref 82–98)
MONOCYTES # BLD AUTO: 0.72 THOUSAND/ΜL (ref 0.17–1.22)
MONOCYTES NFR BLD AUTO: 7 % (ref 4–12)
NEUTROPHILS # BLD AUTO: 7.71 THOUSANDS/ΜL (ref 1.85–7.62)
NEUTS SEG NFR BLD AUTO: 72 % (ref 43–75)
NITRITE UR QL STRIP: NEGATIVE
NRBC BLD AUTO-RTO: 0 /100 WBCS
PH UR STRIP.AUTO: 8 [PH]
PLATELET # BLD AUTO: 259 THOUSANDS/UL (ref 149–390)
PMV BLD AUTO: 10.8 FL (ref 8.9–12.7)
POTASSIUM SERPL-SCNC: 2.8 MMOL/L (ref 3.5–5.3)
PROT SERPL-MCNC: 8.6 G/DL (ref 6.4–8.2)
PROT UR STRIP-MCNC: NEGATIVE MG/DL
RBC # BLD AUTO: 4.71 MILLION/UL (ref 3.81–5.12)
RSV RNA RESP QL NAA+PROBE: NEGATIVE
SARS-COV-2 RNA RESP QL NAA+PROBE: NEGATIVE
SODIUM SERPL-SCNC: 140 MMOL/L (ref 136–145)
SP GR UR STRIP.AUTO: 1.01 (ref 1–1.03)
TROPONIN I SERPL-MCNC: <0.02 NG/ML
UROBILINOGEN UR QL STRIP.AUTO: 1 E.U./DL
WBC # BLD AUTO: 10.75 THOUSAND/UL (ref 4.31–10.16)

## 2021-03-21 PROCEDURE — 36415 COLL VENOUS BLD VENIPUNCTURE: CPT | Performed by: PHYSICIAN ASSISTANT

## 2021-03-21 PROCEDURE — 96375 TX/PRO/DX INJ NEW DRUG ADDON: CPT

## 2021-03-21 PROCEDURE — 99285 EMERGENCY DEPT VISIT HI MDM: CPT

## 2021-03-21 PROCEDURE — 80053 COMPREHEN METABOLIC PANEL: CPT | Performed by: PHYSICIAN ASSISTANT

## 2021-03-21 PROCEDURE — 99285 EMERGENCY DEPT VISIT HI MDM: CPT | Performed by: PHYSICIAN ASSISTANT

## 2021-03-21 PROCEDURE — 83735 ASSAY OF MAGNESIUM: CPT | Performed by: PHYSICIAN ASSISTANT

## 2021-03-21 PROCEDURE — 76705 ECHO EXAM OF ABDOMEN: CPT

## 2021-03-21 PROCEDURE — 93005 ELECTROCARDIOGRAM TRACING: CPT

## 2021-03-21 PROCEDURE — 96365 THER/PROPH/DIAG IV INF INIT: CPT

## 2021-03-21 PROCEDURE — 81025 URINE PREGNANCY TEST: CPT | Performed by: PHYSICIAN ASSISTANT

## 2021-03-21 PROCEDURE — 83690 ASSAY OF LIPASE: CPT | Performed by: PHYSICIAN ASSISTANT

## 2021-03-21 PROCEDURE — 85025 COMPLETE CBC W/AUTO DIFF WBC: CPT | Performed by: PHYSICIAN ASSISTANT

## 2021-03-21 PROCEDURE — 81003 URINALYSIS AUTO W/O SCOPE: CPT | Performed by: PHYSICIAN ASSISTANT

## 2021-03-21 PROCEDURE — 84484 ASSAY OF TROPONIN QUANT: CPT | Performed by: PHYSICIAN ASSISTANT

## 2021-03-21 PROCEDURE — 96361 HYDRATE IV INFUSION ADD-ON: CPT

## 2021-03-21 PROCEDURE — 96366 THER/PROPH/DIAG IV INF ADDON: CPT

## 2021-03-21 PROCEDURE — 0241U HB NFCT DS VIR RESP RNA 4 TRGT: CPT | Performed by: PHYSICIAN ASSISTANT

## 2021-03-21 PROCEDURE — 99220 PR INITIAL OBSERVATION CARE/DAY 70 MINUTES: CPT | Performed by: NURSE PRACTITIONER

## 2021-03-21 RX ORDER — NICOTINE 21 MG/24HR
1 PATCH, TRANSDERMAL 24 HOURS TRANSDERMAL DAILY
Status: DISCONTINUED | OUTPATIENT
Start: 2021-03-22 | End: 2021-03-23 | Stop reason: HOSPADM

## 2021-03-21 RX ORDER — ACETAMINOPHEN 325 MG/1
650 TABLET ORAL EVERY 6 HOURS PRN
Status: DISCONTINUED | OUTPATIENT
Start: 2021-03-21 | End: 2021-03-23 | Stop reason: HOSPADM

## 2021-03-21 RX ORDER — SODIUM CHLORIDE 9 MG/ML
125 INJECTION, SOLUTION INTRAVENOUS CONTINUOUS
Status: DISCONTINUED | OUTPATIENT
Start: 2021-03-21 | End: 2021-03-21

## 2021-03-21 RX ORDER — POTASSIUM CHLORIDE 14.9 MG/ML
20 INJECTION INTRAVENOUS
Status: DISPENSED | OUTPATIENT
Start: 2021-03-21 | End: 2021-03-21

## 2021-03-21 RX ORDER — ONDANSETRON 2 MG/ML
4 INJECTION INTRAMUSCULAR; INTRAVENOUS ONCE
Status: COMPLETED | OUTPATIENT
Start: 2021-03-21 | End: 2021-03-21

## 2021-03-21 RX ORDER — METOCLOPRAMIDE HYDROCHLORIDE 5 MG/ML
10 INJECTION INTRAMUSCULAR; INTRAVENOUS ONCE
Status: COMPLETED | OUTPATIENT
Start: 2021-03-21 | End: 2021-03-21

## 2021-03-21 RX ORDER — PANTOPRAZOLE SODIUM 40 MG/1
40 INJECTION, POWDER, FOR SOLUTION INTRAVENOUS
Status: DISCONTINUED | OUTPATIENT
Start: 2021-03-22 | End: 2021-03-22

## 2021-03-21 RX ORDER — DIPHENHYDRAMINE HYDROCHLORIDE 50 MG/ML
25 INJECTION INTRAMUSCULAR; INTRAVENOUS ONCE
Status: COMPLETED | OUTPATIENT
Start: 2021-03-21 | End: 2021-03-21

## 2021-03-21 RX ORDER — METOCLOPRAMIDE HYDROCHLORIDE 5 MG/ML
10 INJECTION INTRAMUSCULAR; INTRAVENOUS EVERY 6 HOURS PRN
Status: DISCONTINUED | OUTPATIENT
Start: 2021-03-21 | End: 2021-03-23 | Stop reason: HOSPADM

## 2021-03-21 RX ORDER — SODIUM CHLORIDE AND POTASSIUM CHLORIDE .9; .15 G/100ML; G/100ML
125 SOLUTION INTRAVENOUS CONTINUOUS
Status: DISCONTINUED | OUTPATIENT
Start: 2021-03-21 | End: 2021-03-23 | Stop reason: HOSPADM

## 2021-03-21 RX ADMIN — METOCLOPRAMIDE HYDROCHLORIDE 10 MG: 5 INJECTION INTRAMUSCULAR; INTRAVENOUS at 17:07

## 2021-03-21 RX ADMIN — DIPHENHYDRAMINE HYDROCHLORIDE 25 MG: 50 INJECTION, SOLUTION INTRAMUSCULAR; INTRAVENOUS at 17:07

## 2021-03-21 RX ADMIN — POTASSIUM CHLORIDE 20 MEQ: 14.9 INJECTION, SOLUTION INTRAVENOUS at 17:13

## 2021-03-21 RX ADMIN — FAMOTIDINE 20 MG: 10 INJECTION INTRAVENOUS at 15:52

## 2021-03-21 RX ADMIN — SODIUM CHLORIDE 125 ML/HR: 0.9 INJECTION, SOLUTION INTRAVENOUS at 16:55

## 2021-03-21 RX ADMIN — METOCLOPRAMIDE HYDROCHLORIDE 10 MG: 5 INJECTION INTRAMUSCULAR; INTRAVENOUS at 21:29

## 2021-03-21 RX ADMIN — SODIUM CHLORIDE 1000 ML: 0.9 INJECTION, SOLUTION INTRAVENOUS at 15:52

## 2021-03-21 RX ADMIN — ONDANSETRON 4 MG: 2 INJECTION INTRAMUSCULAR; INTRAVENOUS at 15:52

## 2021-03-21 RX ADMIN — SODIUM CHLORIDE AND POTASSIUM CHLORIDE 125 ML/HR: .9; .15 SOLUTION INTRAVENOUS at 22:30

## 2021-03-21 RX ADMIN — ACETAMINOPHEN 650 MG: 325 TABLET, FILM COATED ORAL at 22:31

## 2021-03-21 NOTE — ED NOTES
Pt mother standing in baxter  Per mother pt is complaining of her arm burning  Pt requesting IV fluids and Potassium to be stopped  Medications stopped provider aware        Nish Vizcarra RN  03/21/21 1911

## 2021-03-21 NOTE — LETTER
[address] [facility]  [address] [facility] [address]  Dept: [phone]    March 23, 2021     Patient: Jhony Buchanan   YOB: 1990   Date of Visit: 3/21/2021       To Whom it May Concern:    Jhony Buchanan is under my professional care  She was seen in the hospital from 3/21/2021   to 03/23/21  She may return to work on Friday, 3/26/21, without limitations  If you have any questions or concerns, please don't hesitate to call           Sincerely,          Samina Rodriguez, DO

## 2021-03-21 NOTE — ED PROVIDER NOTES
History  Chief Complaint   Patient presents with    Abdominal Pain     pt padma seen in [place] yesterday for abodminal pain, was told she has a hernia  also last bowel movement was 4 days ago  pt was told to come back to ED if symptoms dont resovled    Nausea     was given zofran after d/c with no relief     31year old female presents for evaluation of abdominal pain and continued nausea/vomiting  Pt notes she was seen at another emergency room yesterday for the same symptoms  She was discharged with prescriptions for reglan and pepcid but did not get those filled yet  She has had no meds today  She reports she hasn't been able to eat or drink or keep anything down  She is unsure of how many episodes of vomiting but reports "numerous"  She describes bilious vomiting  No blood reported  She also reports constipation and states her last bowel movement was 3-4 days ago  Denies diarrhea  Denies sick contacts  She does report recent travel from [place] to the area  No known exposure to covid  Denies fever, chills, cough or congestion  PMH includes asthma  No prior abdominal surgeries reported  She had CT scan yesterday and was told she had a hiatal hernia  No prior GI evaluation for her symptoms  She does report frequent episodes of vomiting and abdominal pain but no formal diagnosis        History provided by:  Patient   used: No    Abdominal Pain  Pain location:  Epigastric  Pain radiates to:  Does not radiate  Duration:  3 days  Chronicity:  New  Context: retching    Context: not previous surgeries, not recent illness, not recent travel, not sick contacts, not suspicious food intake and not trauma    Relieved by:  None tried  Worsened by:  Eating  Ineffective treatments:  None tried  Associated symptoms: constipation, nausea and vomiting    Associated symptoms: no chest pain, no chills, no cough, no diarrhea, no dysuria, no fatigue, no fever, no hematuria, no shortness of breath and no sore throat    Risk factors: has not had multiple surgeries, not pregnant and no recent hospitalization    Nausea  The primary symptoms include abdominal pain, nausea and vomiting  Primary symptoms do not include fever, fatigue, diarrhea, dysuria, myalgias or rash  The illness is also significant for constipation  The illness does not include chills or back pain  Prior to Admission Medications   Prescriptions Last Dose Informant Patient Reported? Taking?   acetaminophen (TYLENOL) 325 mg tablet   No No   Si mg q 6 hours p r n  pain/fever   Patient not taking: Reported on 3/20/2021   cyclobenzaprine (FLEXERIL) 10 mg tablet Not Taking at Unknown time  No No   Sig: Take 1 tablet (10 mg total) by mouth 3 (three) times a day as needed for muscle spasms   Patient not taking: Reported on 3/20/2021   famotidine (PEPCID) 20 mg tablet Past Week at Unknown time  No Yes   Sig: Take 1 tablet (20 mg total) by mouth 2 (two) times a day   metoclopramide (REGLAN) 10 mg tablet Past Week at Unknown time  No Yes   Sig: Take 1 tablet (10 mg total) by mouth every 6 (six) hours for 5 days   naproxen (NAPROSYN) 500 mg tablet Not Taking at Unknown time  No No   Sig: Take 1 tablet (500 mg total) by mouth every 12 (twelve) hours   Patient not taking: Reported on 3/20/2021   nicotine (NICODERM CQ) 14 mg/24hr TD 24 hr patch Not Taking at Unknown time  No No   Sig: Place 1 patch on the skin daily   Patient not taking: Reported on 3/20/2021      Facility-Administered Medications: None       Past Medical History:   Diagnosis Date    Asthma        Past Surgical History:   Procedure Laterality Date    CYST REMOVAL      SHOULDER SURGERY      R shoulder       History reviewed  No pertinent family history  I have reviewed and agree with the history as documented      E-Cigarette/Vaping     E-Cigarette/Vaping Substances     Social History     Tobacco Use    Smoking status: Current Every Day Smoker     Packs/day: 1 00    Smokeless tobacco: Never Used   Substance Use Topics    Alcohol use: Yes     Comment: socailly    Drug use: No       Review of Systems   Constitutional: Negative  Negative for chills, fatigue and fever  HENT: Negative  Negative for congestion, rhinorrhea and sore throat  Eyes: Negative  Negative for visual disturbance  Respiratory: Negative  Negative for cough, shortness of breath and wheezing  Cardiovascular: Negative  Negative for chest pain, palpitations and leg swelling  Gastrointestinal: Positive for abdominal pain, constipation, nausea and vomiting  Negative for diarrhea  Genitourinary: Negative  Negative for dysuria, flank pain, frequency and hematuria  Musculoskeletal: Negative  Negative for back pain and myalgias  Skin: Negative  Negative for rash  Neurological: Negative  Negative for dizziness, light-headedness and headaches  Psychiatric/Behavioral: Negative  Negative for confusion  All other systems reviewed and are negative  Physical Exam  Physical Exam  Vitals signs and nursing note reviewed  Constitutional:       General: She is not in acute distress  Appearance: She is well-developed  She is not toxic-appearing  HENT:      Head: Normocephalic and atraumatic  Right Ear: Hearing and external ear normal       Left Ear: Hearing and external ear normal       Nose: Nose normal       Mouth/Throat:      Pharynx: Oropharynx is clear  Uvula midline  No oropharyngeal exudate  Eyes:      General: No scleral icterus  Extraocular Movements: Extraocular movements intact  Conjunctiva/sclera: Conjunctivae normal       Pupils: Pupils are equal, round, and reactive to light  Neck:      Musculoskeletal: Normal range of motion and neck supple  Trachea: Trachea and phonation normal  No tracheal deviation  Cardiovascular:      Rate and Rhythm: Normal rate and regular rhythm  Pulses: Normal pulses        Heart sounds: Normal heart sounds, S1 normal and S2 normal  No murmur  Pulmonary:      Effort: Pulmonary effort is normal  No respiratory distress  Breath sounds: Normal breath sounds  No wheezing, rhonchi or rales  Abdominal:      General: Bowel sounds are normal  There is no distension  Palpations: Abdomen is soft  Tenderness: There is abdominal tenderness in the epigastric area  There is no right CVA tenderness, left CVA tenderness, guarding or rebound  Hernia: No hernia is present  Musculoskeletal: Normal range of motion  General: No tenderness  Skin:     General: Skin is warm and dry  Capillary Refill: Capillary refill takes less than 2 seconds  Findings: No rash  Neurological:      General: No focal deficit present  Mental Status: She is alert and oriented to person, place, and time  GCS: GCS eye subscore is 4  GCS verbal subscore is 5  GCS motor subscore is 6  Cranial Nerves: No cranial nerve deficit  Sensory: No sensory deficit  Motor: No abnormal muscle tone     Psychiatric:         Mood and Affect: Mood normal          Speech: Speech normal          Behavior: Behavior normal          Vital Signs  ED Triage Vitals [03/21/21 1440]   Temperature Pulse Respirations Blood Pressure SpO2   98 2 °F (36 8 °C) 56 18 129/77 98 %      Temp Source Heart Rate Source Patient Position - Orthostatic VS BP Location FiO2 (%)   Temporal Monitor Sitting Right arm --      Pain Score       --           Vitals:    03/21/21 1900 03/21/21 1930 03/21/21 2000 03/21/21 2030   BP: 113/82 136/77 119/75 118/67   Pulse:    66   Patient Position - Orthostatic VS:             Visual Acuity      ED Medications  Medications   potassium chloride 20 mEq IVPB (premix) (0 mEq Intravenous Hold 3/21/21 1930)   sodium chloride 0 9 % infusion (125 mL/hr Intravenous New Bag 3/21/21 1655)   sodium chloride 0 9 % bolus 1,000 mL (0 mL Intravenous Stopped 3/21/21 1652)   ondansetron (ZOFRAN) injection 4 mg (4 mg Intravenous Given 3/21/21 1552)   famotidine (PEPCID) injection 20 mg (20 mg Intravenous Given 3/21/21 1552)   metoclopramide (REGLAN) injection 10 mg (10 mg Intravenous Given 3/21/21 1707)   diphenhydrAMINE (BENADRYL) injection 25 mg (25 mg Intravenous Given 3/21/21 1707)       Diagnostic Studies  Results Reviewed     Procedure Component Value Units Date/Time    COVID19, Influenza A/B, RSV PCR, [facility] [[id number]]  (Normal) Collected: 03/21/21 1736    Lab Status: Final result Specimen: Nasopharyngeal Swab Updated: 03/21/21 1819     SARS-CoV-2 Negative     INFLUENZA A PCR Negative     INFLUENZA B PCR Negative     RSV PCR Negative    Narrative: This test has been authorized by FDA under an EUA (Emergency Use Assay) for use by authorized laboratories  Clinical caution and judgement should be used with the interpretation of these results with consideration of the clinical impression and other laboratory testing  Testing reported as "Positive" or "Negative" has been proven to be accurate according to standard laboratory validation requirements  All testing is performed with control materials showing appropriate reactivity at standard intervals      UA w Reflex to Microscopic w Reflex to Culture [[id number]]  (Abnormal) Collected: 03/21/21 1634    Lab Status: Final result Specimen: Urine, Clean Catch Updated: 03/21/21 1642     Color, UA Yellow     Clarity, UA Clear     Specific Gravity, UA 1 015     pH, UA 8 0     Leukocytes, UA Negative     Nitrite, UA Negative     Protein, UA Negative mg/dl      Glucose, UA Negative mg/dl      Ketones, UA Trace mg/dl      Urobilinogen, UA 1 0 E U /dl      Bilirubin, UA Negative     Blood, UA Negative    POCT pregnancy, urine [[id number]]  (Normal) Resulted: 03/21/21 1634    Lab Status: Final result Updated: 03/21/21 1638     EXT PREG TEST UR (Ref: Negative) negative     Control valid    Comprehensive metabolic panel [[id number]]  (Abnormal) Collected: 03/21/21 1539    Lab Status: Final result Specimen: Blood from Arm, Right Updated: 03/21/21 1626     Sodium 140 mmol/L      Potassium 2 8 mmol/L      Chloride 101 mmol/L      CO2 30 mmol/L      ANION GAP 9 mmol/L      BUN 11 mg/dL      Creatinine 0 85 mg/dL      Glucose 110 mg/dL      Calcium 9 5 mg/dL      AST 12 U/L      ALT 29 U/L      Alkaline Phosphatase 63 U/L      Total Protein 8 6 g/dL      Albumin 4 7 g/dL      Total Bilirubin 0 60 mg/dL      eGFR 94 ml/min/1 73sq m     Narrative:      National Kidney Disease Foundation guidelines for Chronic Kidney Disease (CKD):     Stage 1 with normal or high GFR (GFR > 90 mL/min/1 73 square meters)    Stage 2 Mild CKD (GFR = 60-89 mL/min/1 73 square meters)    Stage 3A Moderate CKD (GFR = 45-59 mL/min/1 73 square meters)    Stage 3B Moderate CKD (GFR = 30-44 mL/min/1 73 square meters)    Stage 4 Severe CKD (GFR = 15-29 mL/min/1 73 square meters)    Stage 5 End Stage CKD (GFR <15 mL/min/1 73 square meters)  Note: GFR calculation is accurate only with a steady state creatinine    Troponin I [[id number]]  (Normal) Collected: 03/21/21 1539    Lab Status: Final result Specimen: Blood from Arm, Right Updated: 03/21/21 1601     Troponin I <0 02 ng/mL     CBC and differential [[id number]]  (Abnormal) Collected: 03/21/21 1539    Lab Status: Final result Specimen: Blood from Arm, Right Updated: 03/21/21 1555     WBC 10 75 Thousand/uL      RBC 4 71 Million/uL      Hemoglobin 13 5 g/dL      Hematocrit 40 7 %      MCV 86 fL      MCH 28 7 pg      MCHC 33 2 g/dL      RDW 13 2 %      MPV 10 8 fL      Platelets 990 Thousands/uL      nRBC 0 /100 WBCs      Neutrophils Relative 72 %      Immat GRANS % 0 %      Lymphocytes Relative 20 %      Monocytes Relative 7 %      Eosinophils Relative 0 %      Basophils Relative 1 %      Neutrophils Absolute 7 71 Thousands/µL      Immature Grans Absolute 0 04 Thousand/uL      Lymphocytes Absolute 2 19 Thousands/µL      Monocytes Absolute 0 72 Thousand/µL      Eosinophils Absolute 0 01 Thousand/µL      Basophils Absolute 0 08 Thousands/µL     Lipase [[id number]]  (Normal) Collected: 03/21/21 1539    Lab Status: Final result Specimen: Blood from Arm, Right Updated: 03/21/21 1553     Lipase 90 u/L     Magnesium [[id number]]  (Normal) Collected: 03/21/21 1539    Lab Status: Final result Specimen: Blood from Arm, Right Updated: 03/21/21 1553     Magnesium 2 1 mg/dL                  US right upper quadrant   Final Result by Dellis Scheuermann, MD (03/21 1631)      No acute abnormality  Workstation performed: [id number]                    Procedures  ECG 12 Lead Documentation Only    Date/Time: 3/21/2021 3:45 PM  Performed by: Gavino Markham PA-C  Authorized by: Gavino Markham PA-C     Indications / Diagnosis:  Abd pain, n/v  ECG reviewed by me, the ED Provider: yes    Patient location:  ED  Previous ECG:     Previous ECG:  Unavailable    Comparison to cardiac monitor: Yes    Interpretation:     Interpretation: non-specific    Rate:     ECG rate:  50    ECG rate assessment: bradycardic    Rhythm:     Rhythm: sinus bradycardia    Ectopy:     Ectopy: none    QRS:     QRS axis:  Normal    QRS intervals:  Normal  Conduction:     Conduction: normal    ST segments:     ST segments:  Normal  T waves:     T waves: normal    Comments:      , QRS 98, QT/QTc 442/402; no acute ischemic changes  No prior for comparison  ED Course  ED Course as of Mar 21 2058   Donita Solomon Mar 21, 2021   1508 Reviewed ED visit, labs and imaging from yesterday's ER visit 3/20/21  CT abd/pelv showed a hiatal hernia but otherwise no acute findings  1608 Decreased from 13 25 yesterday   WBC(!): 10 75   1609 Hemoglobin: 13 5   1609 Platelet Count: 249   1609 Lipase: 90   1609 Magnesium: 2 1   1609 Troponin I: <0 02   1624 US performed and pending interpretation        1630 Glucose, Random: 110   1630 Creatinine: 0 85   1630 BUN: 11   1630 Sodium: 140   1630 Will replete with IV potassium since pt is unable to tolerate PO    Potassium(!): 2 8   1630 Chloride: 101   1630 CO2: 30   1630 Anion Gap: 9   1630 Calcium: 9 5   1630 AST: 12   1630 ALT: 29   1630 Alkaline Phosphatase: 63   1630 Total Protein(!): 8 6   1630 Albumin: 4 7   1630 TOTAL BILIRUBIN: 0 60   1630 eGFR: 94   1635 IMPRESSION:     No acute abnormality  US right upper quadrant   1641 Results discussed with pt and family member  She feels unchanged  Continues with nausea and pain  Pt still does not feel she can tolerate by mouth  Will replenish potassium via IV and further treat symptomatically  If pt improves and is able to tolerate PO, anticipate discharge home with outpatient GI follow up  However, if still symptomatic, will plan to admit for intractable nausea/vomiting  She was questioned about any marijuana use and did admit to me that she occasionally using recreationally but denies any recent use       1654 PREGNANCY TEST URINE: negative   1654 Otherwise no evidence of infection   Ketones, UA(!): Trace   1826 SARS-COV-2: Negative   1826 INFLU A PCR: Negative   1827 INFLU B PCR: Negative   1827 RSV PCR: Negative   1830 Pt updated  Feeling improved  Will give ice chips and water to PO challenge  Potassium still infusing  1906 Pt reported IV potassium is now bothering her even with fluids running  She does not want any further potassium by IV  If pt agreeable, will give PO and if able to tolerate, discharge home  2000 Not feeling well, still complaining of epigastric pain; wants to stay; unable to tolerate the ice chips or ice water  2037 Tiger text sent to on call ENRIQUETA Still NP to discuss admission  2045 Pt accepted for observation admission to service of Dr Adriana Price  2057 Pt in agreeance with admission/treatment plan  Will admit for intractable nausea/vomiting and epigastric pain  Will need further monitoring/supplementation of potassium  Hypokalemia felt to be related to her nausea/vomiting    Pt afebrile and hemodynamically stable  Admitted in stable condition  HEART Risk Score      Most Recent Value   Heart Score Risk Calculator   History  0 Filed at: 03/21/2021 1738   ECG  0 Filed at: 03/21/2021 1738   Age  0 Filed at: 03/21/2021 1738   Risk Factors  0 Filed at: 03/21/2021 1738   Troponin  0 Filed at: 03/21/2021 1738   HEART Score  0 Filed at: 03/21/2021 1738                      SBIRT 20yo+      Most Recent Value   SBIRT (23 yo +)   In order to provide better care to our patients, we are screening all of our patients for alcohol and drug use  Would it be okay to ask you these screening questions? Yes Filed at: 03/21/2021 1443   Initial Alcohol Screen: US AUDIT-C    1  How often do you have a drink containing alcohol?  0 Filed at: 03/21/2021 1443   2  How many drinks containing alcohol do you have on a typical day you are drinking? 0 Filed at: 03/21/2021 1443   3a  Male UNDER 65: How often do you have five or more drinks on one occasion? 0 Filed at: 03/21/2021 1443   3b  FEMALE Any Age, or MALE 65+: How often do you have 4 or more drinks on one occassion? 0 Filed at: 03/21/2021 1443   Audit-C Score  0 Filed at: 03/21/2021 1443   ISAIAH: How many times in the past year have you    Used an illegal drug or used a prescription medication for non-medical reasons?   Never Filed at: 03/21/2021 1443                    MDM  Number of Diagnoses or Management Options  Epigastric abdominal pain: established and worsening  Hypokalemia: new and requires workup  Nausea and vomiting: established and worsening     Amount and/or Complexity of Data Reviewed  Clinical lab tests: ordered and reviewed  Tests in the radiology section of CPT®: ordered and reviewed  Decide to obtain previous medical records or to obtain history from someone other than the patient: yes  Obtain history from someone other than the patient: yes  Review and summarize past medical records: yes  Discuss the patient with other providers: yes (Attending, [facility])  Independent visualization of images, tracings, or specimens: yes    Patient Progress  Patient progress: stable      Disposition  Final diagnoses:   Nausea and vomiting   Epigastric abdominal pain   Hypokalemia     Time reflects when diagnosis was documented in both MDM as applicable and the Disposition within this note     Time User Action Codes Description Comment    3/21/2021  6:56 PM Dairl Layman Add [R11 2] Nausea and vomiting     3/21/2021  6:56 PM Dairl Layman Add [R10 13] Epigastric abdominal pain     3/21/2021  6:56 PM Dairl Layman Add [E87 6] Hypokalemia       ED Disposition     ED Disposition Condition Date/Time Comment    Admit Stable Sun Mar 21, 2021  8:46 PM Case was discussed with Chester Edwards NP and the patient's admission status was agreed to be Admission Status: observation status to the service of Dr Yoshi Peguero  Follow-up Information    None         Patient's Medications   Discharge Prescriptions    No medications on file     No discharge procedures on file      PDMP Review     None          ED Provider  Electronically Signed by           Eleazar Marques PA-C  03/21/21 4356

## 2021-03-22 ENCOUNTER — APPOINTMENT (OUTPATIENT)
Dept: NON INVASIVE DIAGNOSTICS | Facility: HOSPITAL | Age: 31
End: 2021-03-22
Payer: COMMERCIAL

## 2021-03-22 PROBLEM — I49.3 VENTRICULAR ECTOPY: Status: ACTIVE | Noted: 2021-03-22

## 2021-03-22 PROBLEM — R07.9 CHEST PAIN: Status: ACTIVE | Noted: 2021-03-22

## 2021-03-22 LAB
ALBUMIN SERPL BCP-MCNC: 3.5 G/DL (ref 3.5–5)
ALP SERPL-CCNC: 50 U/L (ref 46–116)
ALT SERPL W P-5'-P-CCNC: 27 U/L (ref 12–78)
ANION GAP SERPL CALCULATED.3IONS-SCNC: 10 MMOL/L (ref 4–13)
ANION GAP SERPL CALCULATED.3IONS-SCNC: 8 MMOL/L (ref 4–13)
AST SERPL W P-5'-P-CCNC: 18 U/L (ref 5–45)
B-HCG SERPL-ACNC: <2 MIU/ML
BILIRUB SERPL-MCNC: 0.5 MG/DL (ref 0.2–1)
BUN SERPL-MCNC: 12 MG/DL (ref 5–25)
BUN SERPL-MCNC: 8 MG/DL (ref 5–25)
CALCIUM SERPL-MCNC: 8.5 MG/DL (ref 8.3–10.1)
CALCIUM SERPL-MCNC: 8.6 MG/DL (ref 8.3–10.1)
CHLORIDE SERPL-SCNC: 106 MMOL/L (ref 100–108)
CHLORIDE SERPL-SCNC: 106 MMOL/L (ref 100–108)
CO2 SERPL-SCNC: 24 MMOL/L (ref 21–32)
CO2 SERPL-SCNC: 25 MMOL/L (ref 21–32)
CREAT SERPL-MCNC: 0.71 MG/DL (ref 0.6–1.3)
CREAT SERPL-MCNC: 0.77 MG/DL (ref 0.6–1.3)
D DIMER PPP FEU-MCNC: 0.42 UG/ML FEU
ERYTHROCYTE [DISTWIDTH] IN BLOOD BY AUTOMATED COUNT: 13.2 % (ref 11.6–15.1)
GFR SERPL CREATININE-BSD FRML MDRD: 105 ML/MIN/1.73SQ M
GFR SERPL CREATININE-BSD FRML MDRD: 116 ML/MIN/1.73SQ M
GLUCOSE SERPL-MCNC: 93 MG/DL (ref 65–140)
GLUCOSE SERPL-MCNC: 99 MG/DL (ref 65–140)
HAV IGM SER QL: NORMAL
HBV CORE IGM SER QL: NORMAL
HBV SURFACE AG SER QL: NORMAL
HCT VFR BLD AUTO: 36.8 % (ref 34.8–46.1)
HCV AB SER QL: NORMAL
HGB BLD-MCNC: 11.8 G/DL (ref 11.5–15.4)
MAGNESIUM SERPL-MCNC: 1.9 MG/DL (ref 1.6–2.6)
MCH RBC QN AUTO: 28 PG (ref 26.8–34.3)
MCHC RBC AUTO-ENTMCNC: 32.1 G/DL (ref 31.4–37.4)
MCV RBC AUTO: 87 FL (ref 82–98)
PHOSPHATE SERPL-MCNC: 3.5 MG/DL (ref 2.7–4.5)
PLATELET # BLD AUTO: 262 THOUSANDS/UL (ref 149–390)
PMV BLD AUTO: 10.1 FL (ref 8.9–12.7)
POTASSIUM SERPL-SCNC: 3.4 MMOL/L (ref 3.5–5.3)
POTASSIUM SERPL-SCNC: 4 MMOL/L (ref 3.5–5.3)
PROT SERPL-MCNC: 6.7 G/DL (ref 6.4–8.2)
RBC # BLD AUTO: 4.21 MILLION/UL (ref 3.81–5.12)
SODIUM SERPL-SCNC: 139 MMOL/L (ref 136–145)
SODIUM SERPL-SCNC: 140 MMOL/L (ref 136–145)
TROPONIN I SERPL-MCNC: <0.02 NG/ML
WBC # BLD AUTO: 9.96 THOUSAND/UL (ref 4.31–10.16)

## 2021-03-22 PROCEDURE — 84702 CHORIONIC GONADOTROPIN TEST: CPT | Performed by: INTERNAL MEDICINE

## 2021-03-22 PROCEDURE — 84484 ASSAY OF TROPONIN QUANT: CPT | Performed by: INTERNAL MEDICINE

## 2021-03-22 PROCEDURE — 93005 ELECTROCARDIOGRAM TRACING: CPT

## 2021-03-22 PROCEDURE — 99225 PR SBSQ OBSERVATION CARE/DAY 25 MINUTES: CPT | Performed by: PHYSICIAN ASSISTANT

## 2021-03-22 PROCEDURE — 85379 FIBRIN DEGRADATION QUANT: CPT | Performed by: INTERNAL MEDICINE

## 2021-03-22 PROCEDURE — 36415 COLL VENOUS BLD VENIPUNCTURE: CPT | Performed by: NURSE PRACTITIONER

## 2021-03-22 PROCEDURE — 99244 OFF/OP CNSLTJ NEW/EST MOD 40: CPT | Performed by: INTERNAL MEDICINE

## 2021-03-22 PROCEDURE — 93306 TTE W/DOPPLER COMPLETE: CPT | Performed by: INTERNAL MEDICINE

## 2021-03-22 PROCEDURE — 80074 ACUTE HEPATITIS PANEL: CPT | Performed by: INTERNAL MEDICINE

## 2021-03-22 PROCEDURE — 80053 COMPREHEN METABOLIC PANEL: CPT | Performed by: NURSE PRACTITIONER

## 2021-03-22 PROCEDURE — 84100 ASSAY OF PHOSPHORUS: CPT | Performed by: NURSE PRACTITIONER

## 2021-03-22 PROCEDURE — C9113 INJ PANTOPRAZOLE SODIUM, VIA: HCPCS | Performed by: NURSE PRACTITIONER

## 2021-03-22 PROCEDURE — 80048 BASIC METABOLIC PNL TOTAL CA: CPT | Performed by: NURSE PRACTITIONER

## 2021-03-22 PROCEDURE — C9113 INJ PANTOPRAZOLE SODIUM, VIA: HCPCS | Performed by: PHYSICIAN ASSISTANT

## 2021-03-22 PROCEDURE — 85027 COMPLETE CBC AUTOMATED: CPT | Performed by: NURSE PRACTITIONER

## 2021-03-22 PROCEDURE — 93306 TTE W/DOPPLER COMPLETE: CPT

## 2021-03-22 PROCEDURE — 84484 ASSAY OF TROPONIN QUANT: CPT | Performed by: NURSE PRACTITIONER

## 2021-03-22 PROCEDURE — 83735 ASSAY OF MAGNESIUM: CPT | Performed by: NURSE PRACTITIONER

## 2021-03-22 RX ORDER — PANTOPRAZOLE SODIUM 40 MG/1
40 INJECTION, POWDER, FOR SOLUTION INTRAVENOUS EVERY 12 HOURS SCHEDULED
Status: DISCONTINUED | OUTPATIENT
Start: 2021-03-22 | End: 2021-03-23 | Stop reason: HOSPADM

## 2021-03-22 RX ORDER — KETOROLAC TROMETHAMINE 30 MG/ML
15 INJECTION, SOLUTION INTRAMUSCULAR; INTRAVENOUS ONCE
Status: COMPLETED | OUTPATIENT
Start: 2021-03-22 | End: 2021-03-22

## 2021-03-22 RX ORDER — ONDANSETRON 2 MG/ML
4 INJECTION INTRAMUSCULAR; INTRAVENOUS EVERY 4 HOURS PRN
Status: DISCONTINUED | OUTPATIENT
Start: 2021-03-22 | End: 2021-03-23 | Stop reason: HOSPADM

## 2021-03-22 RX ORDER — ALBUTEROL SULFATE 90 UG/1
2 AEROSOL, METERED RESPIRATORY (INHALATION) EVERY 4 HOURS PRN
Status: DISCONTINUED | OUTPATIENT
Start: 2021-03-22 | End: 2021-03-23 | Stop reason: HOSPADM

## 2021-03-22 RX ORDER — POTASSIUM CHLORIDE 20 MEQ/1
40 TABLET, EXTENDED RELEASE ORAL
Status: COMPLETED | OUTPATIENT
Start: 2021-03-22 | End: 2021-03-22

## 2021-03-22 RX ADMIN — POTASSIUM CHLORIDE 40 MEQ: 1500 TABLET, EXTENDED RELEASE ORAL at 04:17

## 2021-03-22 RX ADMIN — ACETAMINOPHEN 650 MG: 325 TABLET, FILM COATED ORAL at 23:08

## 2021-03-22 RX ADMIN — SODIUM CHLORIDE AND POTASSIUM CHLORIDE 125 ML/HR: .9; .15 SOLUTION INTRAVENOUS at 11:20

## 2021-03-22 RX ADMIN — PANTOPRAZOLE SODIUM 40 MG: 40 INJECTION, POWDER, FOR SOLUTION INTRAVENOUS at 11:22

## 2021-03-22 RX ADMIN — KETOROLAC TROMETHAMINE 15 MG: 30 INJECTION, SOLUTION INTRAMUSCULAR; INTRAVENOUS at 08:59

## 2021-03-22 RX ADMIN — PANTOPRAZOLE SODIUM 40 MG: 40 INJECTION, POWDER, FOR SOLUTION INTRAVENOUS at 20:18

## 2021-03-22 RX ADMIN — NICOTINE 1 PATCH: 21 PATCH, EXTENDED RELEASE TRANSDERMAL at 11:25

## 2021-03-22 RX ADMIN — METOCLOPRAMIDE HYDROCHLORIDE 10 MG: 5 INJECTION INTRAMUSCULAR; INTRAVENOUS at 04:16

## 2021-03-22 RX ADMIN — MORPHINE SULFATE 2 MG: 2 INJECTION, SOLUTION INTRAMUSCULAR; INTRAVENOUS at 11:22

## 2021-03-22 RX ADMIN — POTASSIUM CHLORIDE 40 MEQ: 1500 TABLET, EXTENDED RELEASE ORAL at 01:24

## 2021-03-22 RX ADMIN — METOCLOPRAMIDE HYDROCHLORIDE 10 MG: 5 INJECTION INTRAMUSCULAR; INTRAVENOUS at 20:54

## 2021-03-22 RX ADMIN — ALUMINA, MAGNESIA, AND SIMETHICONE ORAL SUSPENSION REGULAR STRENGTH 10 ML: 1200; 1200; 120 SUSPENSION ORAL at 17:13

## 2021-03-22 NOTE — CASE MANAGEMENT
Cm met with the patient to evaluate the patients prior function and living situation and any barriers to d/c and form a safe d/c plan  Cm also evaluated the patient for any services in the home or needs for services  Pt states she lives alone in apartment  Only 2 [address]  She is independent has no services or assistive devices  She drives but mother available for transport home  PCP [facility]  Pharmacy is [id number] [address]  Plans at this time are to d/c home with o/p follow up

## 2021-03-22 NOTE — UTILIZATION REVIEW
Initial Clinical Review    Admission: Date/Time/Statement:   Admission Orders (From admission, onward)     Ordered        03/21/21 2049  Place in Observation  Once         03/21/21 2047  Place in Observation  Once                   Orders Placed This Encounter   Procedures    Place in Observation     Standing Status:   Standing     Number of Occurrences:   1     Order Specific Question:   Level of Care     Answer:   Med Surg [16]    Place in Observation     Standing Status:   Standing     Number of Occurrences:   1     Order Specific Question:   Level of Care     Answer:   Med Surg [16]     ED Arrival Information     Expected Arrival Acuity Means of Arrival Escorted By Service Admission Type    - 3/21/2021 14:35 Urgent Ambulance [facility] General Medicine Urgent    Arrival Complaint    Abd Pain;Nausea        Chief Complaint   Patient presents with    Abdominal Pain     pt waas seen in [place] yesterday for abodminal pain, was told she has a hernia  also last bowel movement was 4 days ago  pt was told to come back to ED if symptoms dont resovled    Nausea     was given zofran after d/c with no relief     Assessment/Plan:   31year old female presents for evaluation of abdominal pain and continued nausea/vomiting  Pt notes she was seen at another emergency room yesterday for the same symptoms  She was discharged with prescriptions for reglan and pepcid but did not get those filled yet  She has had no meds today  She reports she hasn't been able to eat or drink or keep anything down  She is unsure of how many episodes of vomiting but reports "numerous"  She describes bilious vomiting  No blood reported  She also reports constipation and states her last bowel movement was 3-4 days ago  Denies diarrhea  Denies sick contacts  She does report recent travel from [place] to the area  No known exposure to covid  Denies fever, chills, cough or congestion      PMH includes asthma  No prior abdominal surgeries reported  She had CT scan yesterday and was told she had a hiatal hernia  No prior GI evaluation for her symptoms  She does report frequent episodes of vomiting and abdominal pain but no formal diagnosis  Epigastric pain  Assessment & Plan  Chief complaint epigastric pain accompanied with nausea vomiting  Patient seen [facility] yesterday for the same-was discharged home with Reglan  CT collected on 03/20 no acute findings  Ultrasound of the right upper quadrant no acute findings  Surgical soft diet  Reglan p r n    Normal saline with 20 K at 125 mL an hour     Leukocytosis  Assessment & Plan  As evidence by white blood cell count of 10 75  No signs of acute infection  Most likely reactive  Trend CBC q d      Hypokalemia  Assessment & Plan  As evidence by potassium level of 2 8 emergency room  Telemetry monitoring ordered  Trend BMP  Replete as needed     Tobacco use  Assessment & Plan  Recommend tobacco cessation  Nicotine replacement patch ordered    ED Triage Vitals   Temperature Pulse Respirations Blood Pressure SpO2   03/21/21 1440 03/21/21 1440 03/21/21 1440 03/21/21 1440 03/21/21 1440   98 2 °F (36 8 °C) 56 18 129/77 98 %      Temp Source Heart Rate Source Patient Position - Orthostatic VS BP Location FiO2 (%)   03/21/21 1440 03/21/21 1440 03/21/21 1440 03/21/21 1440 --   Temporal Monitor Sitting Right arm       Pain Score       03/21/21 2109       5          Wt Readings from Last 1 Encounters:   03/21/21 68 kg (150 lb)     Additional Vital Signs:   Date/Time  Temp  Pulse  Resp  BP  MAP (mmHg)  SpO2  O2 Device  Patient Position - Orthostatic VS   03/22/21 0800  --  48Abnormal   16  141/68  95  99 %  None (Room air)  Lying   03/22/21 0700  --  53Abnormal   19  99/69  80  98 %  --  --   03/22/21 0400  --  56  19  122/73  93  98 %  --  --   03/22/21 0345  --  51Abnormal   19  --  --  98 %  --  --   03/22/21 0300  --  54Abnormal   17  104/62  77  97 %  --  --   03/22/21 0200  -- 46Abnormal   17  105/57  77  98 %  --  --   03/22/21 0130  --  47Abnormal   28Abnormal   113/56  80  97 %  --  --   03/22/21 0100  --  52Abnormal   17  109/72  87  98 %  --  --   03/22/21 0030  --  61  23Abnormal   123/55  75  98 %  --  --   03/21/21 2245  --  51Abnormal   19  --  --  97 %  --  --   03/21/21 2230  --  67  27Abnormal   104/56  75  98 %  --  --   03/21/21 2215  --  54Abnormal   22  --  --  97 %  --  --   03/21/21 2200  --  58  24Abnormal   106/62   79  98 %  --  --     Pertinent Labs/Diagnostic Test Results:   Results from last 7 days   Lab Units 03/21/21  1736   SARS-COV-2  Negative     Results from last 7 days   Lab Units 03/22/21 0523 03/21/21  1539 03/20/21  0438   WBC Thousand/uL 9 96 10 75* 13 25*   HEMOGLOBIN g/dL 11 8 13 5 13 5   HEMATOCRIT % 36 8 40 7 41 3   PLATELETS Thousands/uL 262 259 324   NEUTROS ABS Thousands/µL  --  7 71* 9 05*     Results from last 7 days   Lab Units 03/22/21 0523 03/21/21  1539 03/20/21  0438   SODIUM mmol/L 139 140 139   POTASSIUM mmol/L 4 0 2 8* 3 5   CHLORIDE mmol/L 106 101 106   CO2 mmol/L 25 30 28   ANION GAP mmol/L 8 9 5   BUN mg/dL 12 11 16   CREATININE mg/dL 0 71 0 85 0 92   EGFR ml/min/1 73sq m 116 94 85   CALCIUM mg/dL 8 6 9 5 9 5   MAGNESIUM mg/dL 1 9 2 1  --    PHOSPHORUS mg/dL 3 5  --   --      Results from last 7 days   Lab Units 03/22/21 0523 03/21/21  1539 03/20/21  0438   AST U/L 18 12 13   ALT U/L 27 29 28   ALK PHOS U/L 50 63 61   TOTAL PROTEIN g/dL 6 7 8 6* 8 2   ALBUMIN g/dL 3 5 4 7 4 5   TOTAL BILIRUBIN mg/dL 0 50 0 60 0 55     Results from last 7 days   Lab Units 03/22/21  0523 03/21/21  1539 03/20/21  0438   GLUCOSE RANDOM mg/dL 99 110 111     Results from last 7 days   Lab Units 03/21/21  1539   TROPONIN I ng/mL <0 02     Results from last 7 days   Lab Units 03/21/21  1539 03/20/21  0438   LIPASE u/L 90 77     Results from last 7 days   Lab Units 03/21/21  1634   CLARITY UA  Clear   COLOR UA  Yellow   SPEC GRAV UA  1 015   PH UA  8 0 GLUCOSE UA mg/dl Negative   KETONES UA mg/dl Trace*   BLOOD UA  Negative   PROTEIN UA mg/dl Negative   NITRITE UA  Negative   BILIRUBIN UA  Negative   UROBILINOGEN UA E U /dl 1 0   LEUKOCYTES UA  Negative     Results from last 7 days   Lab Units 03/21/21  1736   INFLUENZA A PCR  Negative   INFLUENZA B PCR  Negative   RSV PCR  Negative     3/21   RUQ=  No acute abnormality      ED Treatment:   Medication Administration from 03/21/2021 1435 to 03/22/2021 0836       Date/Time Order Dose Route Action     03/21/2021 1552 sodium chloride 0 9 % bolus 1,000 mL 1,000 mL Intravenous New Bag     03/21/2021 1552 ondansetron (ZOFRAN) injection 4 mg 4 mg Intravenous Given     03/21/2021 1552 famotidine (PEPCID) injection 20 mg 20 mg Intravenous Given     03/21/2021 1713 potassium chloride 20 mEq IVPB (premix) 20 mEq Intravenous New Bag     03/21/2021 1655 sodium chloride 0 9 % infusion 125 mL/hr Intravenous New Bag     03/21/2021 1707 metoclopramide (REGLAN) injection 10 mg 10 mg Intravenous Given     03/21/2021 1707 diphenhydrAMINE (BENADRYL) injection 25 mg 25 mg Intravenous Given     03/22/2021 0416 metoclopramide (REGLAN) injection 10 mg 10 mg Intravenous Given     03/21/2021 2129 metoclopramide (REGLAN) injection 10 mg 10 mg Intravenous Given     03/21/2021 2231 acetaminophen (TYLENOL) tablet 650 mg 650 mg Oral Given     03/21/2021 2230 sodium chloride 0 9 % with KCl 20 mEq/L infusion (premix) 125 mL/hr Intravenous New Bag     03/22/2021 0417 potassium chloride (K-DUR,KLOR-CON) CR tablet 40 mEq 40 mEq Oral Given     03/22/2021 0124 potassium chloride (K-DUR,KLOR-CON) CR tablet 40 mEq 40 mEq Oral Given        Past Medical History:   Diagnosis Date    Asthma      Present on Admission:   Tobacco use   Hypokalemia   Leukocytosis   Epigastric pain    Admitting Diagnosis: Abdominal pain [R10 9]  Nausea [R11 0]  Age/Sex: 31 y o  female  Admission Orders:  Contact & airborne isolation  Telemetry  Consult cardio  Consult GI    Scheduled Medications:  ketorolac, 15 mg, Intravenous, Once  nicotine, 1 patch, Transdermal, Daily  pantoprazole, 40 mg, Intravenous, Q24H BRIDGETT    Continuous IV Infusions:  sodium chloride 0 9 % with KCl 20 mEq/L, 125 mL/hr, Intravenous, Continuous    PRN Meds:  acetaminophen, 650 mg, Oral, Q6H PRN  metoclopramide, 10 mg, Intravenous, Q6H PRN      Network Utilization Review Department  ATTENTION: Please call with any questions or concerns to [phone] and carefully listen to the prompts so that you are directed to the right person  All voicemails are confidential   Puneet Javier all requests for admission clinical reviews, approved or denied determinations and any other requests to dedicated fax number below belonging to the campus where the patient is receiving treatment   List of dedicated fax numbers for the Facilities:  [address] DENIALS (Administrative/Medical Necessity) [phone]   [address] (Maternity/NICU/Pediatrics) [phone] [address] [facility] [address] Afsaneh Salazar 4564 (  Kassi Antony [facility]) [address] [place] [address] Donna Osullivan 1481 [address] [place], [place] [address] [phone]

## 2021-03-22 NOTE — ED NOTES
Starting around 0100 pt began having PVCs that eventually led to a run of VT (9 PVCs captured on rhythm strip)  Adi longed hospitalist Swank about same and attached picture from monitor of run, who called back at 0111  Spoke with her about pt not tolerating IV K+ earlier but that she has been taking PO meds  Same placed order for K+ PO and pt tolerated fine       Rosana Wasserman RN  03/22/21 6583

## 2021-03-22 NOTE — ED NOTES
Pt c/o nausea and feeling like she's going to vomit  Mandan at bedside  Reglan given  Tylenol PO held until nausea resolves       Jaylen Carrillo RN  03/21/21 6341

## 2021-03-22 NOTE — ASSESSMENT & PLAN NOTE
As evidence by white blood cell count of 10 75- now resolved  No signs of acute infection  Most likely reactive  Trend CBC q d

## 2021-03-22 NOTE — ED NOTES
Tiger Text sent at 2140 to [facility] r/t pt c/o nausea, and VS (current HR 46, /63, 98% RA)       Tez Bedoya RN  03/21/21 2146

## 2021-03-22 NOTE — ASSESSMENT & PLAN NOTE
As evidence by white blood cell count of 10 75  No signs of acute infection  Most likely reactive  Trend CBC q d

## 2021-03-22 NOTE — PROGRESS NOTES
[address]  Progress Note - Prabha Patterson 1990, 31 y o  female MRN: [id number]  Unit/Bed#: 407-01 Encounter: [id number]  Primary Care Provider: Alex Álvarez MD   Date and time admitted to hospital: 3/21/2021  2:35 PM    * Epigastric pain  Assessment & Plan  Chief complaint epigastric pain accompanied with nausea vomiting  Patient seen [facility] ED on 3/20/21 for the same-was discharged home with Reglan  CT collected on 03/20 no acute findings  Ultrasound of the right upper quadrant no acute findings  Surgical soft diet- will switch to clear liquid diet  Reglan p r n  Normal saline with 20 K at 125 mL an hour  GI consulted- spoke with GI on call, will plan for EGD tomorrow  Ventricular ectopy  Assessment & Plan  Likely in the setting of hypokalemia  Cardiology consultation appreciated  Echocardiogram pending     Hypokalemia  Assessment & Plan  As evidence by potassium level of 2 8 emergency room  Resolved at 4 0 with supplementation  Continue to monitor    Tobacco use  Assessment & Plan  Recommend tobacco cessation  Nicotine replacement patch ordered    Leukocytosis  Assessment & Plan  As evidence by white blood cell count of 10 75- now resolved  No signs of acute infection  Most likely reactive  Trend CBC q d  VTE Pharmacologic Prophylaxis:   Pharmacologic: low risk  Mechanical VTE Prophylaxis in Place: Yes    Patient Centered Rounds: I have performed bedside rounds with nursing staff today  Discussions with Specialists or Other Care Team Provider: nursing, CM, cardiology, GI    Education and Discussions with Family / Patient: patient    Time Spent for Care: 20 minutes  More than 50% of total time spent on counseling and coordination of care as described above      Current Length of Stay: 0 day(s)    Current Patient Status: Observation   Certification Statement: The patient will continue to require additional inpatient hospital stay due to continued workup of epigastric pain, N/V with GI consult and planned EGD tomorrow    Discharge Plan: TBD    Code Status: Level 1 - Full Code      Subjective: The patient was seen and examined  The patient states she continues to have epigastric pain and feels mildly nauseated  Vomiting resolved with medications  Objective:     Vitals:   Temp (24hrs), Av 3 °F (36 8 °C), Min:97 9 °F (36 6 °C), Max:98 7 °F (37 1 °C)    Temp:  [97 9 °F (36 6 °C)-98 7 °F (37 1 °C)] 97 9 °F (36 6 °C)  HR:  [46-67] 50  Resp:  [14-28] 16  BP: ()/(55-82) 116/68  SpO2:  [97 %-100 %] 100 %  Body mass index is 25 02 kg/m²  Input and Output Summary (last 24 hours): Intake/Output Summary (Last 24 hours) at 3/22/2021 1603  Last data filed at 3/22/2021 1230  Gross per 24 hour   Intake 1000 ml   Output 300 ml   Net 700 ml       Physical Exam:     Physical Exam  Vitals signs and nursing note reviewed  Constitutional:       General: She is awake  Appearance: Normal appearance  Cardiovascular:      Rate and Rhythm: Normal rate and regular rhythm  Pulmonary:      Effort: Pulmonary effort is normal       Breath sounds: Normal breath sounds  No wheezing, rhonchi or rales  Abdominal:      General: Bowel sounds are normal       Palpations: Abdomen is soft  Tenderness: There is abdominal tenderness in the epigastric area  There is no guarding or rebound  Skin:     General: Skin is warm and dry  Neurological:      General: No focal deficit present  Mental Status: She is alert and oriented to person, place, and time  Psychiatric:         Attention and Perception: Attention normal          Mood and Affect: Mood normal          Speech: Speech normal          Behavior: Behavior is cooperative           Additional Data:     Labs:    Results from last 7 days   Lab Units 21  0523 21  1539   WBC Thousand/uL 9 96 10 75*   HEMOGLOBIN g/dL 11 8 13 5   HEMATOCRIT % 36 8 40 7   PLATELETS Thousands/uL 262 259   NEUTROS PCT %  --  72   LYMPHS PCT %  --  20   MONOS PCT %  --  7   EOS PCT %  --  0     Results from last 7 days   Lab Units 03/22/21  0523   SODIUM mmol/L 139   POTASSIUM mmol/L 4 0   CHLORIDE mmol/L 106   CO2 mmol/L 25   BUN mg/dL 12   CREATININE mg/dL 0 71   ANION GAP mmol/L 8   CALCIUM mg/dL 8 6   ALBUMIN g/dL 3 5   TOTAL BILIRUBIN mg/dL 0 50   ALK PHOS U/L 50   ALT U/L 27   AST U/L 18   GLUCOSE RANDOM mg/dL 99                           * I Have Reviewed All Lab Data Listed Above  * Additional Pertinent Lab Tests Reviewed: All Labs Within Last 24 Hours Reviewed    Imaging:    Imaging Reports Reviewed Today Include: none  Imaging Personally Reviewed by Myself Includes:  none    Recent Cultures (last 7 days):           Last 24 Hours Medication List:   Current Facility-Administered Medications   Medication Dose Route Frequency Provider Last Rate    acetaminophen  650 mg Oral Q6H PRN GERALD Marrero      al mag oxide-diphenhydramine-lidocaine viscous  10 mL Swish & Swallow Q4H PRN Raymon Melton PA-C      metoclopramide  10 mg Intravenous Q6H PRN GERALD Marrero      nicotine  1 patch Transdermal Daily Dayna Y SwankGERALD      ondansetron  4 mg Intravenous Q4H PRN Raymon Melton PA-C      pantoprazole  40 mg Intravenous Q12H [facility] Raymon Melton PA-C      sodium chloride 0 9 % with KCl 20 mEq/L  125 mL/hr Intravenous Continuous Dayna Y Swank, LISANP 125 mL/hr (03/22/21 1120)        Today, Patient Was Seen By: Raymon Melton PA-C    ** Please Note: Dictation voice to text software may have been used in the creation of this document   **

## 2021-03-22 NOTE — PLAN OF CARE
Problem: PAIN - ADULT  Goal: Verbalizes/displays adequate comfort level or baseline comfort level  Description: Interventions:  - Encourage patient to monitor pain and request assistance  - Assess pain using appropriate pain scale  - Administer analgesics based on type and severity of pain and evaluate response  - Implement non-pharmacological measures as appropriate and evaluate response  - Consider cultural and social influences on pain and pain management  - Notify physician/advanced practitioner if interventions unsuccessful or patient reports new pain  Outcome: Progressing     Problem: INFECTION - ADULT  Goal: Absence or prevention of progression during hospitalization  Description: INTERVENTIONS:  - Assess and monitor for signs and symptoms of infection  - Monitor lab/diagnostic results  - Monitor all insertion sites, i e  indwelling lines, tubes, and drains  - Administer medications as ordered  - Instruct and encourage patient and family to use good hand hygiene technique  - Identify and instruct in appropriate isolation precautions for identified infection/condition  Outcome: Progressing     Problem: SAFETY ADULT  Goal: Patient will remain free of falls  Description: INTERVENTIONS:  - Assess patient frequently for physical needs  -  Identify cognitive and physical deficits and behaviors that affect risk of falls    -  [place] fall precautions as indicated by assessment   - Educate patient/family on patient safety including physical limitations  - Instruct patient to call for assistance with activity based on assessment  - Modify environment to reduce risk of injury  - Consider OT/PT consult to assist with strengthening/mobility  Outcome: Progressing  Goal: Maintain or return to baseline ADL function  Description: INTERVENTIONS:  -  Assess patient's ability to carry out ADLs; assess patient's baseline for ADL function and identify physical deficits which impact ability to perform ADLs (bathing, care of mouth/teeth, toileting, grooming, dressing, etc )  - Assess/evaluate cause of self-care deficits   - Assess range of motion  - Assess patient's mobility; develop plan if impaired  - Assess patient's need for assistive devices and provide as appropriate  - Encourage maximum independence but intervene and supervise when necessary  - Involve family in performance of ADLs  - Assess for home care needs following discharge   - Consider OT consult to assist with ADL evaluation and planning for discharge  - Provide patient education as appropriate  Outcome: Progressing  Goal: Maintain or return mobility status to optimal level  Description: INTERVENTIONS:  - Assess patient's baseline mobility status (ambulation, transfers, stairs, etc )    - Identify cognitive and physical deficits and behaviors that affect mobility  - Identify mobility aids required to assist with transfers and/or ambulation (gait belt, sit-to-stand, lift, walker, cane, etc )  - [place] fall precautions as indicated by assessment  - Record patient progress and toleration of activity level on Mobility SBAR; progress patient to next Phase/Stage  - Instruct patient to call for assistance with activity based on assessment  - Consider rehabilitation consult to assist with strengthening/weightbearing, etc   Outcome: Progressing     Problem: DISCHARGE PLANNING  Goal: Discharge to home or other facility with appropriate resources  Description: INTERVENTIONS:  - Identify barriers to discharge w/patient and caregiver  - Arrange for needed discharge resources and transportation as appropriate  - Identify discharge learning needs (meds, wound care, etc )  - Refer to Case Management Department for coordinating discharge planning if the patient needs post-hospital services based on physician/advanced practitioner order or complex needs related to functional status, cognitive ability, or social support system  Outcome: Progressing     Problem: Knowledge Deficit  Goal: Patient/family/caregiver demonstrates understanding of disease process, treatment plan, medications, and discharge instructions  Description: Complete learning assessment and assess knowledge base    Interventions:  - Provide teaching at level of understanding  - Provide teaching via preferred learning methods  Outcome: Progressing     Problem: GASTROINTESTINAL - ADULT  Goal: Minimal or absence of nausea and/or vomiting  Description: INTERVENTIONS:  - Administer IV fluids if ordered to ensure adequate hydration  - Maintain NPO status until nausea and vomiting are resolved  - Nasogastric tube if ordered  - Administer ordered antiemetic medications as needed  - Provide nonpharmacologic comfort measures as appropriate  - Advance diet as tolerated, if ordered  - Consider nutrition services referral to assist patient with adequate nutrition and appropriate food choices  Outcome: Progressing  Goal: Maintains or returns to baseline bowel function  Description: INTERVENTIONS:  - Assess bowel function  - Encourage oral fluids to ensure adequate hydration  - Administer IV fluids if ordered to ensure adequate hydration  - Administer ordered medications as needed  - Encourage mobilization and activity  - Consider nutritional services referral to assist patient with adequate nutrition and appropriate food choices  Outcome: Progressing  Goal: Maintains adequate nutritional intake  Description: INTERVENTIONS:  - Monitor percentage of each meal consumed  - Identify factors contributing to decreased intake, treat as appropriate  - Assist with meals as needed  - Monitor I&O, weight, and lab values if indicated  - Obtain nutrition services referral as needed  Outcome: Progressing     Problem: METABOLIC, FLUID AND ELECTROLYTES - ADULT  Goal: Electrolytes maintained within normal limits  Description: INTERVENTIONS:  - Monitor labs and assess patient for signs and symptoms of electrolyte imbalances  - Administer electrolyte replacement as ordered  - Monitor response to electrolyte replacements, including repeat lab results as appropriate  - Instruct patient on fluid and nutrition as appropriate  Outcome: Progressing  Goal: Fluid balance maintained  Description: INTERVENTIONS:  - Monitor labs   - Monitor I/O and WT  - Instruct patient on fluid and nutrition as appropriate  - Assess for signs & symptoms of volume excess or deficit  Outcome: Progressing

## 2021-03-22 NOTE — CONSULTS
Consultation - Cardiology   [address] [facility] 31 y o  female MRN: [id number]  Unit/Bed#: RM11 Encounter: [id number]    Inpatient consult to Cardiology  Consult performed by: Yonathan Wise PA-C  Consult ordered by: Yoav Arce DO            Physician Requesting Consult: Astrid Rivas DO  Reason for Consult / Principal Problem: ventricular ectopy    Assessment/Plan:  1  Ventricular ectopy   - likely in the setting of hypokalemia (K+ 2 8 on admission)   - no ectopy was seen on telemetry during our conversation today, potassium improved to 4 0   - will check an echocardiogram for completeness   - keep K + >4 and magnesium >2   - if echo is unremarkable no further cardiac evaluation is needed at this time    2  Intractable nausea and vomiting   - no definitive etiology noted on imaging studies   - management per primary team, GI consulted    History of Present Illness   HPI: Tiesha Harper is a 31y o  year old female with asthma and tobacco/marijuana use with no prior cardiac history  The patient presented to the ER yesterday for the evaluation of nausea, vomiting, and abdominal pain  She was seen here the day prior with similar complaints  At that time a CT abdomen/pelvis did not show any acute abnormality but a hiatal hernia was noted  She has not been able to keep anything down  She has been constipated  Yesterday she also reports getting "stabbing" pain in the center of her chest  This is associated with a feeling of shortness of breath  She describes the sensation of her heart racing  She denies lightheadedness, dizziness, and syncope  She denies lower extremity edema  She has no past cardiac history  She denies a family history of arrhythmias, coronary artery disease, or heart failure  Upon admission this time she had a RUQ ultrasound which did not show any acute abnormality  Her potassium was noted to be 2 8 which has been replenished to 4 0 today   She was noted to have frequent ventricular ectopy on telemetry  Thus cardiology was consulted for further evaluation and management  Review of Systems   Constitution: Negative for chills and fever  HENT: Negative  Cardiovascular: Positive for chest pain and palpitations  Negative for dyspnea on exertion, irregular heartbeat, leg swelling, near-syncope, orthopnea, paroxysmal nocturnal dyspnea and syncope  Respiratory: Positive for shortness of breath  Negative for cough  Gastrointestinal: Positive for constipation, nausea and vomiting  Negative for diarrhea  Genitourinary: Negative  Neurological: Negative for dizziness and light-headedness  All other systems reviewed and are negative  Historical Information   Past Medical History:   Diagnosis Date    Asthma      Past Surgical History:   Procedure Laterality Date    CYST REMOVAL      SHOULDER SURGERY      R shoulder     Social History     Substance and Sexual Activity   Alcohol Use Yes    Comment: socailly     Social History     Substance and Sexual Activity   Drug Use Yes    Types: Marijuana     Social History     Tobacco Use   Smoking Status Current Every Day Smoker    Packs/day: 1 00   Smokeless Tobacco Never Used     Family History: non-contributory    Meds/Allergies   all current active meds have been reviewed  sodium chloride 0 9 % with KCl 20 mEq/L, 125 mL/hr, Last Rate: 125 mL/hr (21 2230)        No Known Allergies    Objective   Vitals: Blood pressure 141/68, pulse (!) 48, temperature 98 2 °F (36 8 °C), temperature source Temporal, resp  rate 16, height 5' 5" (1 651 m), weight 68 kg (150 lb), last menstrual period 2021, SpO2 99 %  , Body mass index is 24 96 kg/m² ,   Orthostatic Blood Pressures      Most Recent Value   Blood Pressure  141/68 filed at 2021 0800   Patient Position - Orthostatic VS  Lying filed at 2021 6657        Systolic (10YXF), ZMA:038 , Min:99 , FD     Diastolic (74SNA), REU:29, Min:55, Max:82      Intake/Output Summary (Last 24 hours) at 3/22/2021 9407  Last data filed at 3/21/2021 1652  Gross per 24 hour   Intake 1300 ml   Output --   Net 1300 ml       Weight (last 2 days)     Date/Time   Weight    03/21/21 1440   68 (150)              Invasive Devices     Peripheral Intravenous Line            Peripheral IV 03/21/21 Left Antecubital less than 1 day                  Physical Exam  Vitals signs reviewed  Constitutional:       General: She is not in acute distress  Appearance: She is not diaphoretic  HENT:      Head: Normocephalic and atraumatic  Eyes:      Pupils: Pupils are equal, round, and reactive to light  Neck:      Musculoskeletal: Normal range of motion  Vascular: No carotid bruit  Cardiovascular:      Rate and Rhythm: Normal rate and regular rhythm  Pulses:           Radial pulses are 2+ on the right side and 2+ on the left side  Dorsalis pedis pulses are 2+ on the right side and 2+ on the left side  Heart sounds: S1 normal and S2 normal  No murmur  Pulmonary:      Effort: Pulmonary effort is normal  No respiratory distress  Breath sounds: Normal breath sounds  No wheezing or rales  Abdominal:      General: There is no distension  Palpations: Abdomen is soft  Tenderness: There is abdominal tenderness  Musculoskeletal: Normal range of motion  General: No deformity  Right lower leg: No edema  Left lower leg: No edema  Skin:     General: Skin is warm and dry  Findings: No erythema  Neurological:      General: No focal deficit present  Mental Status: She is alert and oriented to person, place, and time     Psychiatric:         Mood and Affect: Mood normal          Behavior: Behavior normal              Laboratory Results:  Results from last 7 days   Lab Units 03/21/21  1539   TROPONIN I ng/mL <0 02       CBC with diff:   Results from last 7 days   Lab Units 03/22/21  0523 03/21/21  1539 03/20/21  0438   WBC Thousand/uL 9 96 10 75* 13 25*   HEMOGLOBIN g/dL 11 8 13 5 13 5   HEMATOCRIT % 36 8 40 7 41 3   MCV fL 87 86 87   PLATELETS Thousands/uL 262 259 324   MCH pg 28 0 28 7 28 5   MCHC g/dL 32 1 33 2 32 7   RDW % 13 2 13 2 13 8   MPV fL 10 1 10 8 9 9   NRBC AUTO /100 WBCs  --  0 0         CMP:  Results from last 7 days   Lab Units 03/22/21  0523 03/21/21  1539 03/20/21  0438   POTASSIUM mmol/L 4 0 2 8* 3 5   CHLORIDE mmol/L 106 101 106   CO2 mmol/L 25 30 28   BUN mg/dL 12 11 16   CREATININE mg/dL 0 71 0 85 0 92   CALCIUM mg/dL 8 6 9 5 9 5   AST U/L 18 12 13   ALT U/L 27 29 28   ALK PHOS U/L 50 63 61   EGFR ml/min/1 73sq m 116 94 85         BMP:  Results from last 7 days   Lab Units 03/22/21  0523 03/21/21  1539 03/20/21  0438   POTASSIUM mmol/L 4 0 2 8* 3 5   CHLORIDE mmol/L 106 101 106   CO2 mmol/L 25 30 28   BUN mg/dL 12 11 16   CREATININE mg/dL 0 71 0 85 0 92   CALCIUM mg/dL 8 6 9 5 9 5       BNP:  No results for input(s): BNP in the last 72 hours  Magnesium:   Results from last 7 days   Lab Units 03/22/21  0523 03/21/21  1539   MAGNESIUM mg/dL 1 9 2 1       Coags:       TSH:       Hemoglobin A1C       Lipid Profile:         Cardiac testing:   No results found for this or any previous visit  No results found for this or any previous visit  No results found for this or any previous visit  No results found for this or any previous visit  Imaging: I have personally reviewed pertinent reports  Us Right Upper Quadrant    Result Date: 3/21/2021  Narrative: RIGHT UPPER QUADRANT ULTRASOUND INDICATION:     abd pain, n/v  COMPARISON:  3/20/2021 TECHNIQUE:   Real-time ultrasound of the right upper quadrant was performed with a curvilinear transducer with both volumetric sweeps and still imaging techniques  FINDINGS: PANCREAS:  Visualized portions of the pancreas are within normal limits  AORTA AND IVC:  Visualized portions are normal for patient age  LIVER: Size:  Moderately enlarged though likely related to Riedel's lobe compared with prior CT    The liver measures 19 4 cm in the midclavicular line  Contour:  Surface contour is smooth  Parenchyma:  Echogenicity and echotexture are within normal limits  No evidence of suspicious mass  Limited imaging of the main portal vein shows it to be patent and hepatopetal   BILIARY: No gallbladder findings  No intrahepatic biliary dilatation  CBD measures 3 mm  No choledocholithiasis  KIDNEY: Right kidney measures 10 3 x 3 9 x 5 2 cm  Within normal limits  ASCITES:   None  Impression: No acute abnormality  Workstation performed: [id number]     Ct Abdomen Pelvis With Contrast    Result Date: 3/20/2021  Narrative: CT ABDOMEN AND PELVIS WITH IV CONTRAST INDICATION:   Nausea/vomiting;  Epigastric pain;  r/o obstruction  29-year-old female presenting for vomiting for past 2 days  Epigastric abdominal tightness  Sudden onset of abdominal pain and vomiting 2 days ago  COMPARISON:  None  TECHNIQUE:  CT examination of the abdomen and pelvis was performed  Axial, sagittal, and coronal 2D reformatted images were created from the source data and submitted for interpretation  Radiation dose length product (DLP) for this visit:  385 07 mGy-cm   This examination, like all CT scans performed in the [facility], was performed utilizing techniques to minimize radiation dose exposure, including the use of iterative  reconstruction and automated exposure control  IV Contrast:  100 mL of iohexol (OMNIPAQUE) Enteric Contrast:  Enteric contrast was not administered  FINDINGS: ABDOMEN LOWER CHEST:  No clinically significant abnormality identified in the visualized lower chest  LIVER/BILIARY TREE:  One or more subcentimeter sharply circumscribed low-density hepatic lesion(s) are noted, too small to accurately characterize, but statistically most likely to represent subcentimeter hepatic cysts  No suspicious solid hepatic lesion is identified  Hepatic contours are normal   No biliary dilatation   GALLBLADDER:  No calcified gallstones  No pericholecystic inflammatory change  SPLEEN:  Unremarkable  PANCREAS:  Unremarkable  ADRENAL GLANDS:  Unremarkable  KIDNEYS/URETERS:  Unremarkable  No hydronephrosis  STOMACH AND BOWEL:  Evaluation of the GI tract is limited due to lack of oral contrast material  Stomach is distended and filled with ingested food products and air  Hiatal hernia  No evidence of small bowel obstruction  The colon is relatively decompressed, limiting evaluation  Hyperdense material in the cecum, likely ingested medication  APPENDIX: Appendix segmentally visualized  No findings to suggest appendicitis  ABDOMINOPELVIC CAVITY:  There is a small volume of free pelvic fluid, likely physiologic given the patient's age and gender  No pneumoperitoneum  No lymphadenopathy  VESSELS:  Unremarkable for patient's age  PELVIS REPRODUCTIVE ORGANS:  Unremarkable for patient's age  URINARY BLADDER:  Unremarkable  ABDOMINAL WALL/INGUINAL REGIONS:  Unremarkable  OSSEOUS STRUCTURES:  No acute fracture or destructive osseous lesion  Spinal degenerative changes are noted  Impression: Incidental findings as described above  No acute CT abnormality to account for the patient's abdominal symptoms  If clinically warranted, follow-up GI and/or general surgery consultation is recommended   Workstation performed: [id number]     Us Bedside Procedure    Result Date: 3/20/2021  Narrative: 1 2 840 [id number] 2 224 [id number]      EKG reviewed personally:   Telemetry reviewed personally: sinus bradycardia, PVC's, slow ventricular runs    Assessment:  Principal Problem:    Epigastric pain  Active Problems:    Tobacco use    Hypokalemia    Leukocytosis    Ventricular ectopy    Chest pain      Code Status: Level 1 - Full Code

## 2021-03-22 NOTE — ASSESSMENT & PLAN NOTE
"Came to room at 1150 for med pass and found pt dressed in street clothes, IV ripped out, wandering halls attempting to leave AMA. Escorted pt back to room to discuss situation, Charge RN and MD notified. Both present to discuss risks of leaving AMA and benefits of staying in hospital. Pt repeatedly stated \"I just want to sign the paper and go home.\" When asked what his plan was if he left AMA and the infection got worse, pt stated \"I will probably kill myself.\" Pt was asked if he had a plan and means to kill himself and he stated \"I have been thinking about it\" and \"lots of 'em\" when asked if he had guns. MD informed pt he would not be able to leave AMA after stating he wanted he wanted to kill himself. He then said \"I was just kidding. I probably don't have the guts to do it anyway\" and \"I don't have any guns\" and attempted to leave the [facility] floor AMA. Security notified and pt was escorted back to room.     Legal hold initiated at 1218.     Security searched belongings, potentially dangerous items removed from room, pt placed back into hospital clothing, belongings locked in legal hold cabinet. NAM notified for safety sitter.     Will perform PHQ and SADPERSONS     Pt states he has 5 cats at home and is very concerned about them starving. SW notified.   " Likely in the setting of hypokalemia  Cardiology consultation appreciated  Echocardiogram pending

## 2021-03-22 NOTE — ED NOTES
Patient ate very little breakfast  She also reports chest pressure began again        Shweta Francis, ADELSO  03/22/21 8444

## 2021-03-22 NOTE — ED NOTES
Pt placed on cardiac, SPO2, and BP monitoring  Pt used bedpan with no difficulty   Pt has no complaints at this time     Berny Mojica, ADELSO  03/21/21 7194

## 2021-03-22 NOTE — ASSESSMENT & PLAN NOTE
Chief complaint epigastric pain accompanied with nausea vomiting  Patient seen [facility] ED on 3/20/21 for the same-was discharged home with Reglan  CT collected on 03/20 no acute findings  Ultrasound of the right upper quadrant no acute findings  Surgical soft diet- will switch to clear liquid diet  Reglan p r n  Normal saline with 20 K at 125 mL an hour  GI consulted- spoke with GI on call, will plan for EGD tomorrow

## 2021-03-22 NOTE — ASSESSMENT & PLAN NOTE
As evidence by potassium level of 2 8 emergency room  Telemetry monitoring ordered  Trend BMP  Replete as needed

## 2021-03-22 NOTE — ASSESSMENT & PLAN NOTE
As evidence by potassium level of 2 8 emergency room  Resolved at 4 0 with supplementation  Continue to monitor

## 2021-03-22 NOTE — H&P
[address] 1990, 31 y o  female MRN: [id number]  Unit/Bed#: RM11 Encounter: [id number]  Primary Care Provider: Mariangel Qureshi MD   Date and time admitted to hospital: 3/21/2021  2:35 PM    * Epigastric pain  Assessment & Plan  Chief complaint epigastric pain accompanied with nausea vomiting  Patient seen [facility] yesterday for the same-was discharged home with Reglan  CT collected on 03/20 no acute findings  Ultrasound of the right upper quadrant no acute findings  Surgical soft diet  Reglan p r n  Normal saline with 20 K at 125 mL an hour    Leukocytosis  Assessment & Plan  As evidence by white blood cell count of 10 75  No signs of acute infection  Most likely reactive  Trend CBC q d  Hypokalemia  Assessment & Plan  As evidence by potassium level of 2 8 emergency room  Telemetry monitoring ordered  Trend BMP  Replete as needed    Tobacco use  Assessment & Plan  Recommend tobacco cessation  Nicotine replacement patch ordered      VTE Prophylaxis: Low risk activity as tolerated  / reason for no mechanical VTE prophylaxis Low risk activity as tolerated   Code Status:  Full  POLST: POLST is not applicable to this patient    Anticipated Length of Stay:  Patient will be admitted on an Observation basis with an anticipated length of stay of  less than 2 midnights  Justification for Hospital Stay:  Epigastric pain with nausea vomiting, hypokalemia    Total Time for Visit, including Counseling / Coordination of Care: 45 minutes  Greater than 50% of this total time spent on direct patient counseling and coordination of care  Chief Complaint:   Abdominal pain accompanied with nausea and vomiting    History of Present Illness:    Garcia Villalobos is a 31 y o  female with a past medical history of asthma presented to the emergency room for evaluation of persistent abdominal pain accompanied with nausea vomiting    Patient still has mid epigastric pain denies radiation admits to persistent nausea and vomiting  Images and labs were collected patient was found to be hypokalemic with a potassium of 2 8  Chart review indicates patient was seated [facility] yesterday was discharged with Zofran  Patient denies any relief with the Zofran  Images and labs were collected emergency room-see below  No significant findings noted on images  Patient appears mildly clinically dry  Patient will be admitted for observation hydration and electrolyte monitoring  Consider GI consult    Review of Systems:    Review of Systems   Constitutional: Positive for chills and fever  Gastrointestinal: Positive for abdominal pain, nausea and vomiting  All other systems reviewed and are negative  Past Medical and Surgical History:     Past Medical History:   Diagnosis Date    Asthma        Past Surgical History:   Procedure Laterality Date    CYST REMOVAL      SHOULDER SURGERY      R shoulder       Meds/Allergies:    Prior to Admission medications    Medication Sig Start Date End Date Taking?  Authorizing Provider   famotidine (PEPCID) 20 mg tablet Take 1 tablet (20 mg total) by mouth 2 (two) times a day 3/20/21  Yes Taylor dAamson MD   metoclopramide (REGLAN) 10 mg tablet Take 1 tablet (10 mg total) by mouth every 6 (six) hours for 5 days 3/20/21 3/25/21 Yes Taylor Adamson MD   acetaminophen (TYLENOL) 325 mg tablet 650 mg q 6 hours p r n  pain/fever  Patient not taking: Reported on 3/20/2021 8/23/18   Man Oquendo MD   cyclobenzaprine (FLEXERIL) 10 mg tablet Take 1 tablet (10 mg total) by mouth 3 (three) times a day as needed for muscle spasms  Patient not taking: Reported on 3/20/2021 8/23/18   Man Oquendo MD   naproxen (NAPROSYN) 500 mg tablet Take 1 tablet (500 mg total) by mouth every 12 (twelve) hours  Patient not taking: Reported on 3/20/2021 8/23/18   Man Oquendo MD   nicotine (NICODERM CQ) 14 mg/24hr TD 24 hr patch Place 1 patch on the skin daily  Patient not taking: Reported on 3/20/2021 8/24/18   Megha Alejandre MD     I have reviewed home medications using allscripts  Allergies: No Known Allergies    Social History:     Marital Status: Significant Other   Occupation:  Noncontributory    Substance Use History:   Social History     Substance and Sexual Activity   Alcohol Use Yes    Comment: socailly     Social History     Tobacco Use   Smoking Status Current Every Day Smoker    Packs/day: 1 00   Smokeless Tobacco Never Used     Social History     Substance and Sexual Activity   Drug Use No       Family History:    History reviewed  No pertinent family history  Physical Exam:     Vitals:   Blood Pressure: 118/67 (03/21/21 2030)  Pulse: 66 (03/21/21 2030)  Temperature: 98 2 °F (36 8 °C) (03/21/21 1440)  Temp Source: Temporal (03/21/21 1440)  Respirations: 18 (03/21/21 2030)  Height: 5' 5" (165 1 cm) (03/21/21 1440)  Weight - Scale: 68 kg (150 lb) (03/21/21 1440)  SpO2: 99 % (03/21/21 2030)    Physical Exam  Constitutional:       Appearance: Normal appearance  HENT:      Mouth/Throat:      Mouth: Mucous membranes are moist       Pharynx: Oropharynx is clear  Eyes:      Extraocular Movements: Extraocular movements intact  Cardiovascular:      Rate and Rhythm: Normal rate and regular rhythm  Pulmonary:      Effort: Pulmonary effort is normal       Breath sounds: Normal breath sounds  Abdominal:      General: Abdomen is flat  Bowel sounds are normal       Palpations: Abdomen is soft  Tenderness: There is abdominal tenderness  Musculoskeletal:         General: No swelling or tenderness  Skin:     General: Skin is warm and dry  Neurological:      General: No focal deficit present  Mental Status: She is alert and oriented to person, place, and time  GCS: GCS eye subscore is 4  GCS verbal subscore is 5  GCS motor subscore is 6     Psychiatric:         Mood and Affect: Mood normal          Behavior: Behavior normal          Thought Content: Thought content normal          Judgment: Judgment normal          Additional Data:     Lab Results: I have personally reviewed pertinent reports  Results from last 7 days   Lab Units 03/21/21  1539   WBC Thousand/uL 10 75*   HEMOGLOBIN g/dL 13 5   HEMATOCRIT % 40 7   PLATELETS Thousands/uL 259   NEUTROS PCT % 72   LYMPHS PCT % 20   MONOS PCT % 7   EOS PCT % 0     Results from last 7 days   Lab Units 03/21/21  1539   POTASSIUM mmol/L 2 8*   CHLORIDE mmol/L 101   CO2 mmol/L 30   BUN mg/dL 11   CREATININE mg/dL 0 85   CALCIUM mg/dL 9 5   ALK PHOS U/L 63   ALT U/L 29   AST U/L 12           Imaging: I have personally reviewed pertinent reports  Us Right Upper Quadrant    Result Date: 3/21/2021  Narrative: RIGHT UPPER QUADRANT ULTRASOUND INDICATION:     abd pain, n/v  COMPARISON:  3/20/2021 TECHNIQUE:   Real-time ultrasound of the right upper quadrant was performed with a curvilinear transducer with both volumetric sweeps and still imaging techniques  FINDINGS: PANCREAS:  Visualized portions of the pancreas are within normal limits  AORTA AND IVC:  Visualized portions are normal for patient age  LIVER: Size:  Moderately enlarged though likely related to Riedel's lobe compared with prior CT  The liver measures 19 4 cm in the midclavicular line  Contour:  Surface contour is smooth  Parenchyma:  Echogenicity and echotexture are within normal limits  No evidence of suspicious mass  Limited imaging of the main portal vein shows it to be patent and hepatopetal   BILIARY: No gallbladder findings  No intrahepatic biliary dilatation  CBD measures 3 mm  No choledocholithiasis  KIDNEY: Right kidney measures 10 3 x 3 9 x 5 2 cm  Within normal limits  ASCITES:   None  Impression: No acute abnormality  Workstation performed: [id number]     Ct Abdomen Pelvis With Contrast    Result Date: 3/20/2021  Narrative: CT ABDOMEN AND PELVIS WITH IV CONTRAST INDICATION:   Nausea/vomiting;  Epigastric pain;  r/o obstruction  21-year-old female presenting for vomiting for past 2 days  Epigastric abdominal tightness  Sudden onset of abdominal pain and vomiting 2 days ago  COMPARISON:  None  TECHNIQUE:  CT examination of the abdomen and pelvis was performed  Axial, sagittal, and coronal 2D reformatted images were created from the source data and submitted for interpretation  Radiation dose length product (DLP) for this visit:  385 07 mGy-cm   This examination, like all CT scans performed in the [facility], was performed utilizing techniques to minimize radiation dose exposure, including the use of iterative  reconstruction and automated exposure control  IV Contrast:  100 mL of iohexol (OMNIPAQUE) Enteric Contrast:  Enteric contrast was not administered  FINDINGS: ABDOMEN LOWER CHEST:  No clinically significant abnormality identified in the visualized lower chest  LIVER/BILIARY TREE:  One or more subcentimeter sharply circumscribed low-density hepatic lesion(s) are noted, too small to accurately characterize, but statistically most likely to represent subcentimeter hepatic cysts  No suspicious solid hepatic lesion is identified  Hepatic contours are normal   No biliary dilatation  GALLBLADDER:  No calcified gallstones  No pericholecystic inflammatory change  SPLEEN:  Unremarkable  PANCREAS:  Unremarkable  ADRENAL GLANDS:  Unremarkable  KIDNEYS/URETERS:  Unremarkable  No hydronephrosis  STOMACH AND BOWEL:  Evaluation of the GI tract is limited due to lack of oral contrast material  Stomach is distended and filled with ingested food products and air  Hiatal hernia  No evidence of small bowel obstruction  The colon is relatively decompressed, limiting evaluation  Hyperdense material in the cecum, likely ingested medication  APPENDIX: Appendix segmentally visualized  No findings to suggest appendicitis   ABDOMINOPELVIC CAVITY:  There is a small volume of free pelvic fluid, likely physiologic given the patient's age and gender  No pneumoperitoneum  No lymphadenopathy  VESSELS:  Unremarkable for patient's age  PELVIS REPRODUCTIVE ORGANS:  Unremarkable for patient's age  URINARY BLADDER:  Unremarkable  ABDOMINAL WALL/INGUINAL REGIONS:  Unremarkable  OSSEOUS STRUCTURES:  No acute fracture or destructive osseous lesion  Spinal degenerative changes are noted  Impression: Incidental findings as described above  No acute CT abnormality to account for the patient's abdominal symptoms  If clinically warranted, follow-up GI and/or general surgery consultation is recommended  Workstation performed: [id number]     Us Bedside Procedure    Result Date: 3/20/2021  Narrative: 1 2 [id number] 2 224 [id number] 2    [facility] Everywhere Records Reviewed: Yes     ** Please Note: This note has been constructed using a voice recognition system   **

## 2021-03-22 NOTE — ASSESSMENT & PLAN NOTE
Chief complaint epigastric pain accompanied with nausea vomiting  Patient seen [facility] yesterday for the same-was discharged home with Reglan  CT collected on 03/20 no acute findings  Ultrasound of the right upper quadrant no acute findings  Surgical soft diet  Reglan p r n    Normal saline with 20 K at 125 mL an hour

## 2021-03-23 ENCOUNTER — ANESTHESIA EVENT (OUTPATIENT)
Dept: PERIOP | Facility: HOSPITAL | Age: 31
End: 2021-03-23
Payer: COMMERCIAL

## 2021-03-23 ENCOUNTER — APPOINTMENT (OUTPATIENT)
Dept: PERIOP | Facility: HOSPITAL | Age: 31
End: 2021-03-23
Payer: COMMERCIAL

## 2021-03-23 ENCOUNTER — ANESTHESIA (OUTPATIENT)
Dept: PERIOP | Facility: HOSPITAL | Age: 31
End: 2021-03-23
Payer: COMMERCIAL

## 2021-03-23 VITALS
SYSTOLIC BLOOD PRESSURE: 112 MMHG | TEMPERATURE: 98.6 F | HEART RATE: 80 BPM | WEIGHT: 154.32 LBS | HEIGHT: 65 IN | RESPIRATION RATE: 16 BRPM | BODY MASS INDEX: 25.71 KG/M2 | OXYGEN SATURATION: 99 % | DIASTOLIC BLOOD PRESSURE: 63 MMHG

## 2021-03-23 PROBLEM — R10.9 INTRACTABLE ABDOMINAL PAIN: Status: ACTIVE | Noted: 2021-03-23

## 2021-03-23 PROBLEM — K76.9 LIVER LESION: Status: ACTIVE | Noted: 2021-03-23

## 2021-03-23 PROBLEM — R00.1 BRADYCARDIA: Status: ACTIVE | Noted: 2021-03-23

## 2021-03-23 PROBLEM — R11.2 INTRACTABLE NAUSEA AND VOMITING: Status: ACTIVE | Noted: 2021-03-23

## 2021-03-23 LAB
ANION GAP SERPL CALCULATED.3IONS-SCNC: 9 MMOL/L (ref 4–13)
ATRIAL RATE: 46 BPM
ATRIAL RATE: 50 BPM
BASOPHILS # BLD AUTO: 0.07 THOUSANDS/ΜL (ref 0–0.1)
BASOPHILS NFR BLD AUTO: 1 % (ref 0–1)
BUN SERPL-MCNC: 7 MG/DL (ref 5–25)
CALCIUM SERPL-MCNC: 9 MG/DL (ref 8.3–10.1)
CHLORIDE SERPL-SCNC: 106 MMOL/L (ref 100–108)
CO2 SERPL-SCNC: 25 MMOL/L (ref 21–32)
CREAT SERPL-MCNC: 0.71 MG/DL (ref 0.6–1.3)
EOSINOPHIL # BLD AUTO: 0.03 THOUSAND/ΜL (ref 0–0.61)
EOSINOPHIL NFR BLD AUTO: 0 % (ref 0–6)
ERYTHROCYTE [DISTWIDTH] IN BLOOD BY AUTOMATED COUNT: 12.9 % (ref 11.6–15.1)
GFR SERPL CREATININE-BSD FRML MDRD: 116 ML/MIN/1.73SQ M
GLUCOSE P FAST SERPL-MCNC: 91 MG/DL (ref 65–99)
GLUCOSE SERPL-MCNC: 82 MG/DL (ref 65–140)
GLUCOSE SERPL-MCNC: 91 MG/DL (ref 65–140)
HCT VFR BLD AUTO: 40 % (ref 34.8–46.1)
HGB BLD-MCNC: 13.2 G/DL (ref 11.5–15.4)
IMM GRANULOCYTES # BLD AUTO: 0.03 THOUSAND/UL (ref 0–0.2)
IMM GRANULOCYTES NFR BLD AUTO: 0 % (ref 0–2)
LYMPHOCYTES # BLD AUTO: 2.48 THOUSANDS/ΜL (ref 0.6–4.47)
LYMPHOCYTES NFR BLD AUTO: 23 % (ref 14–44)
MAGNESIUM SERPL-MCNC: 1.9 MG/DL (ref 1.6–2.6)
MCH RBC QN AUTO: 28.7 PG (ref 26.8–34.3)
MCHC RBC AUTO-ENTMCNC: 33 G/DL (ref 31.4–37.4)
MCV RBC AUTO: 87 FL (ref 82–98)
MONOCYTES # BLD AUTO: 0.88 THOUSAND/ΜL (ref 0.17–1.22)
MONOCYTES NFR BLD AUTO: 8 % (ref 4–12)
NEUTROPHILS # BLD AUTO: 7.23 THOUSANDS/ΜL (ref 1.85–7.62)
NEUTS SEG NFR BLD AUTO: 68 % (ref 43–75)
NRBC BLD AUTO-RTO: 0 /100 WBCS
P AXIS: 64 DEGREES
P AXIS: 67 DEGREES
PLATELET # BLD AUTO: 277 THOUSANDS/UL (ref 149–390)
PMV BLD AUTO: 10.2 FL (ref 8.9–12.7)
POTASSIUM SERPL-SCNC: 4 MMOL/L (ref 3.5–5.3)
PR INTERVAL: 142 MS
PR INTERVAL: 148 MS
QRS AXIS: 57 DEGREES
QRS AXIS: 60 DEGREES
QRSD INTERVAL: 88 MS
QRSD INTERVAL: 98 MS
QT INTERVAL: 442 MS
QT INTERVAL: 470 MS
QTC INTERVAL: 402 MS
QTC INTERVAL: 411 MS
RBC # BLD AUTO: 4.6 MILLION/UL (ref 3.81–5.12)
SODIUM SERPL-SCNC: 140 MMOL/L (ref 136–145)
T WAVE AXIS: 41 DEGREES
T WAVE AXIS: 43 DEGREES
VENTRICULAR RATE: 46 BPM
VENTRICULAR RATE: 50 BPM
WBC # BLD AUTO: 10.72 THOUSAND/UL (ref 4.31–10.16)

## 2021-03-23 PROCEDURE — 99217 PR OBSERVATION CARE DISCHARGE MANAGEMENT: CPT | Performed by: INTERNAL MEDICINE

## 2021-03-23 PROCEDURE — 99204 OFFICE O/P NEW MOD 45 MIN: CPT | Performed by: INTERNAL MEDICINE

## 2021-03-23 PROCEDURE — 93010 ELECTROCARDIOGRAM REPORT: CPT | Performed by: INTERNAL MEDICINE

## 2021-03-23 PROCEDURE — 43239 EGD BIOPSY SINGLE/MULTIPLE: CPT | Performed by: INTERNAL MEDICINE

## 2021-03-23 PROCEDURE — 88305 TISSUE EXAM BY PATHOLOGIST: CPT | Performed by: PATHOLOGY

## 2021-03-23 PROCEDURE — 85025 COMPLETE CBC W/AUTO DIFF WBC: CPT | Performed by: PHYSICIAN ASSISTANT

## 2021-03-23 PROCEDURE — C9113 INJ PANTOPRAZOLE SODIUM, VIA: HCPCS | Performed by: PHYSICIAN ASSISTANT

## 2021-03-23 PROCEDURE — 80048 BASIC METABOLIC PNL TOTAL CA: CPT | Performed by: PHYSICIAN ASSISTANT

## 2021-03-23 PROCEDURE — 83735 ASSAY OF MAGNESIUM: CPT | Performed by: PHYSICIAN ASSISTANT

## 2021-03-23 PROCEDURE — 82948 REAGENT STRIP/BLOOD GLUCOSE: CPT

## 2021-03-23 RX ORDER — POTASSIUM CHLORIDE 20 MEQ/1
40 TABLET, EXTENDED RELEASE ORAL ONCE
Status: COMPLETED | OUTPATIENT
Start: 2021-03-23 | End: 2021-03-23

## 2021-03-23 RX ORDER — HYDROMORPHONE HCL/PF 1 MG/ML
0.5 SYRINGE (ML) INJECTION EVERY 4 HOURS PRN
Status: DISCONTINUED | OUTPATIENT
Start: 2021-03-23 | End: 2021-03-23 | Stop reason: HOSPADM

## 2021-03-23 RX ORDER — SODIUM CHLORIDE 9 MG/ML
INJECTION, SOLUTION INTRAVENOUS CONTINUOUS PRN
Status: DISCONTINUED | OUTPATIENT
Start: 2021-03-23 | End: 2021-03-23

## 2021-03-23 RX ORDER — MAGNESIUM SULFATE 1 G/100ML
1 INJECTION INTRAVENOUS ONCE
Status: COMPLETED | OUTPATIENT
Start: 2021-03-23 | End: 2021-03-23

## 2021-03-23 RX ORDER — OXYCODONE HYDROCHLORIDE 5 MG/1
5 TABLET ORAL EVERY 4 HOURS PRN
Status: DISCONTINUED | OUTPATIENT
Start: 2021-03-23 | End: 2021-03-23 | Stop reason: HOSPADM

## 2021-03-23 RX ORDER — ACETAMINOPHEN 325 MG/1
650 TABLET ORAL EVERY 6 HOURS PRN
Refills: 0
Start: 2021-03-23

## 2021-03-23 RX ORDER — PROPOFOL 10 MG/ML
INJECTION, EMULSION INTRAVENOUS AS NEEDED
Status: DISCONTINUED | OUTPATIENT
Start: 2021-03-23 | End: 2021-03-23

## 2021-03-23 RX ORDER — LIDOCAINE HYDROCHLORIDE 10 MG/ML
INJECTION, SOLUTION EPIDURAL; INFILTRATION; INTRACAUDAL; PERINEURAL AS NEEDED
Status: DISCONTINUED | OUTPATIENT
Start: 2021-03-23 | End: 2021-03-23

## 2021-03-23 RX ADMIN — ONDANSETRON 4 MG: 2 INJECTION INTRAMUSCULAR; INTRAVENOUS at 08:27

## 2021-03-23 RX ADMIN — PROPOFOL 50 MG: 10 INJECTION, EMULSION INTRAVENOUS at 11:32

## 2021-03-23 RX ADMIN — PROPOFOL 180 MG: 10 INJECTION, EMULSION INTRAVENOUS at 11:28

## 2021-03-23 RX ADMIN — ALBUTEROL SULFATE 2 PUFF: 90 AEROSOL, METERED RESPIRATORY (INHALATION) at 13:57

## 2021-03-23 RX ADMIN — POTASSIUM CHLORIDE 40 MEQ: 1500 TABLET, EXTENDED RELEASE ORAL at 01:40

## 2021-03-23 RX ADMIN — LIDOCAINE HYDROCHLORIDE 100 MG: 10 INJECTION, SOLUTION EPIDURAL; INFILTRATION; INTRACAUDAL; PERINEURAL at 11:28

## 2021-03-23 RX ADMIN — PANTOPRAZOLE SODIUM 40 MG: 40 INJECTION, POWDER, FOR SOLUTION INTRAVENOUS at 08:35

## 2021-03-23 RX ADMIN — SODIUM CHLORIDE AND POTASSIUM CHLORIDE 125 ML/HR: .9; .15 SOLUTION INTRAVENOUS at 04:18

## 2021-03-23 RX ADMIN — ENOXAPARIN SODIUM 40 MG: 40 INJECTION SUBCUTANEOUS at 13:57

## 2021-03-23 RX ADMIN — METOCLOPRAMIDE HYDROCHLORIDE 10 MG: 5 INJECTION INTRAMUSCULAR; INTRAVENOUS at 05:29

## 2021-03-23 RX ADMIN — HYDROMORPHONE HYDROCHLORIDE 0.5 MG: 1 INJECTION, SOLUTION INTRAMUSCULAR; INTRAVENOUS; SUBCUTANEOUS at 09:18

## 2021-03-23 RX ADMIN — NICOTINE 1 PATCH: 21 PATCH, EXTENDED RELEASE TRANSDERMAL at 08:35

## 2021-03-23 RX ADMIN — MAGNESIUM SULFATE HEPTAHYDRATE 1 G: 1 INJECTION, SOLUTION INTRAVENOUS at 08:35

## 2021-03-23 RX ADMIN — ONDANSETRON 4 MG: 2 INJECTION INTRAMUSCULAR; INTRAVENOUS at 01:50

## 2021-03-23 RX ADMIN — SODIUM CHLORIDE: 0.9 INJECTION, SOLUTION INTRAVENOUS at 11:17

## 2021-03-23 RX ADMIN — ALBUTEROL SULFATE 2 PUFF: 90 AEROSOL, METERED RESPIRATORY (INHALATION) at 08:27

## 2021-03-23 NOTE — ASSESSMENT & PLAN NOTE
CT scan of the abdomen/pelvis (03/20/2021):  LIVER/BILIARY TREE:  One or more subcentimeter sharply circumscribed low-density hepatic lesion(s) are noted, too small to accurately characterize, but statistically most likely to represent subcentimeter hepatic cysts  No suspicious solid hepatic lesion is identified  Hepatic contours are normal   No biliary dilatation      · Outpatient surveillance imaging with PCP

## 2021-03-23 NOTE — PROGRESS NOTES
Pt rang and c/o chest pain  Pt vitals stable no changes on telemetry monitor  Pt hr in the 50's-60's  Gloria jolly made aware  Will await further orders

## 2021-03-23 NOTE — ASSESSMENT & PLAN NOTE
· Likely secondary to hypokalemia  · The patient was seen in consultation by Cardiology    · Keep the patient's magnesium at 2 mg/dl and above in the setting of ventricular ectopy  · Keep the patient's potassium at 4 mmol/L and above in the setting of ventricular ectopy  · Recheck blood work on 03/26/2021 as an outpatient with her PCP to monitor her electrolyte levels  · Outpatient follow-up with Cardiology  · Outpatient sleep study to check for obstructive sleep apnea

## 2021-03-23 NOTE — ASSESSMENT & PLAN NOTE
· Mild leukocytosis  · Likely secondary to viral gastroenteritis  · Follow the CBC after discharge with her PCP    Results from last 7 days   Lab Units 03/23/21  0459 03/22/21  0523 03/21/21  1539   WBC Thousand/uL 10 72* 9 96 10 75*   HEMOGLOBIN g/dL 13 2 11 8 13 5   HEMATOCRIT % 40 0 36 8 40 7   PLATELETS Thousands/uL 277 262 259

## 2021-03-23 NOTE — ASSESSMENT & PLAN NOTE
· Resolved with potassium chloride supplementation treatment  · Follow the potassium level and magnesium level with outpatient blood work on 03/26/2021 with her PCP    Results from last 7 days   Lab Units 03/23/21  0459 03/22/21 2109 03/22/21  0523   SODIUM mmol/L 140 140 139   POTASSIUM mmol/L 4 0 3 4* 4 0   CHLORIDE mmol/L 106 106 106   CO2 mmol/L 25 24 25   BUN mg/dL 7 8 12   CREATININE mg/dL 0 71 0 77 0 71   CALCIUM mg/dL 9 0 8 5 8 6

## 2021-03-23 NOTE — ASSESSMENT & PLAN NOTE
· Resolved with potassium chloride supplementation treatment  · Follow the potassium level and magnesium level    Results from last 7 days   Lab Units 03/23/21  0459 03/22/21  2109 03/22/21  0523   SODIUM mmol/L 140 140 139   POTASSIUM mmol/L 4 0 3 4* 4 0   CHLORIDE mmol/L 106 106 106   CO2 mmol/L 25 24 25   BUN mg/dL 7 8 12   CREATININE mg/dL 0 71 0 77 0 71   CALCIUM mg/dL 9 0 8 5 8 6

## 2021-03-23 NOTE — QUICK NOTE
2020-5 from nursing of patient is experiencing chest tightness  Patient evaluated at bedside patient admits to midsternal chest tightness and reports mild shortness of breath  Pain is reproducible to light touch  Additionally patient reports history of asthma and use of an inhaler that she has not taking for some time  Respiratory protocol place  Collect troponin and EKG

## 2021-03-23 NOTE — ASSESSMENT & PLAN NOTE
· Possibly secondary to nausea, vomiting, and abdominal pain  · Also may be secondary to IV pain medications  · The patient was seen in consultation by Cardiology    · Continue to monitor on telemetry

## 2021-03-23 NOTE — ASSESSMENT & PLAN NOTE
· Possibly secondary to nausea, vomiting, and abdominal pain  · Also may be secondary to IV pain medications  · The patient was seen in consultation by Cardiology    · Outpatient sleep study to check for obstructive sleep apnea as the cause of bradycardia  · Outpatient follow-up with Cardiology

## 2021-03-23 NOTE — RESPIRATORY THERAPY NOTE
RT Protocol Note  Tiesha Min 31 y o  female MRN: [id number]  Unit/Bed#: 407-01 Encounter: [id number]    Assessment    Principal Problem:    Epigastric pain  Active Problems:    Tobacco use    Hypokalemia    Leukocytosis    Ventricular ectopy    Chest pain      Home Pulmonary Medications:  Patient had told the CRNP that she did have asthma and once had an inhaler, she does not have any inhalers now   She does not use oxygen or pap therapy       Past Medical History:   Diagnosis Date    Asthma      Social History     Socioeconomic History    Marital status: Significant Other     Spouse name: None    Number of children: None    Years of education: None    Highest education level: None   Occupational History    None   Social Needs    Financial resource strain: None    Food insecurity     Worry: None     Inability: None    Transportation needs     Medical: None     Non-medical: None   Tobacco Use    Smoking status: Current Every Day Smoker     Packs/day: 1 00    Smokeless tobacco: Never Used   Substance and Sexual Activity    Alcohol use: Not Currently     Frequency: Never     Drinks per session: Patient refused     Binge frequency: Never     Comment: socailly    Drug use: Yes     Types: Marijuana    Sexual activity: None   Lifestyle    Physical activity     Days per week: None     Minutes per session: None    Stress: None   Relationships    Social connections     Talks on phone: None     Gets together: None     Attends Protestant service: None     Active member of club or organization: None     Attends meetings of clubs or organizations: None     Relationship status: None    Intimate partner violence     Fear of current or ex partner: None     Emotionally abused: None     Physically abused: None     Forced sexual activity: None   Other Topics Concern    None   Social History Narrative    None       Subjective    Subjective Data: Patient stated her chest hurts and she was a little sob    Objective    Physical Exam:   Assessment Type: Assess only  General Appearance: Awake, Alert  Respiratory Pattern: Normal  Chest Assessment: Chest expansion symmetrical  Bilateral Breath Sounds: Clear  O2 Device: room air    Vitals:  Blood pressure 116/68, pulse 61, temperature 97 9 °F (36 6 °C), resp  rate 18, height 5' 5" (1 651 m), weight 68 2 kg (150 lb 5 7 oz), last menstrual period 03/07/2021, SpO2 99 %  Imaging and other studies: I have personally reviewed pertinent reports  O2 Device: room air     Plan    Respiratory Plan: Home Bronchodilator Patient pathway        Resp Comments: Patient received in her room  She was having blood drawn  Patient stated when I asked her if she took any respiratory medications that she did not  The CRNP went to see patient and the patient stated she had history of asthma and had an inhaler once but does not have one now  I have ordered a prn Ventolin MDI   Patient stated she does not use oxygen or pap therapy

## 2021-03-23 NOTE — CONSULTS
Consultation - [facility]  [address]Tor[address] Grider 31 y o  female MRN: [id number]  Unit/Bed#: 407-01 Encounter: [id number]        ASSESSMENT/PLAN:    nausea/vomiting   epigastric pain    Patient presenting with acute onset of nausea, vomiting and epigastric pain starting last Thursday  There were no precipitating factors  She has not gotten any relief  Symptoms may be secondary to GERD versus peptic ulcer disease versus gastroenteritis  versus gastroparesis as imaging showed a fluid-filled stomach  Given her intractable nausea and vomiting would recommend endoscopy for further evaluation   - NPO  - EGD later today    Consults    Reason for Consult / Principal Problem: Epigastric pain    HPI: Belle5 Nicolas Grider is a 31y o  year old female  Who presented with nausea, vomiting and epigastric abdominal pain since last Thursday  She states her symptoms came on suddenly  She denies prior symptoms  She denies any heartburn or reflux  There were no precipitating factors  She states the vomiting became intractable prompting her to come to the emergency room  She denies any improvement since being here  She denies any sick contacts  She denies NSAIDs  She is a smoker  Denies alcohol  Admits to occasional marijuana use  CT showed stomach was distended and filled with food and air, hiatal hernia, small amount of free pelvic fluid  Review of Systems: as per HPI  Review of Systems   All other systems reviewed and are negative        Historical Information   Past Medical History:   Diagnosis Date    Asthma      Past Surgical History:   Procedure Laterality Date    CYST REMOVAL      SHOULDER SURGERY      R shoulder    TONSILLECTOMY       Social History   Social History     Substance and Sexual Activity   Alcohol Use Not Currently    Frequency: Never    Drinks per session: Patient refused    Binge frequency: Never    Comment: socailly     Social History     Substance and Sexual Activity   Drug Use Yes  Types: Marijuana     Social History     Tobacco Use   Smoking Status Current Every Day Smoker    Packs/day: 1 00   Smokeless Tobacco Never Used     History reviewed  No pertinent family history  Meds/Allergies     No medications prior to admission  Current Facility-Administered Medications   Medication Dose Route Frequency    acetaminophen (TYLENOL) tablet 650 mg  650 mg Oral Q6H PRN    al mag oxide-diphenhydramine-lidocaine viscous (MAGIC MOUTHWASH) suspension 10 mL  10 mL Swish & Swallow Q4H PRN    albuterol (PROVENTIL HFA,VENTOLIN HFA) inhaler 2 puff  2 puff Inhalation Q4H PRN    oxyCODONE (ROXICODONE) IR tablet 5 mg  5 mg Oral Q4H PRN    Or    HYDROmorphone (DILAUDID) injection 0 5 mg  0 5 mg Intravenous Q4H PRN    metoclopramide (REGLAN) injection 10 mg  10 mg Intravenous Q6H PRN    nicotine (NICODERM CQ) 21 mg/24 hr TD 24 hr patch 1 patch  1 patch Transdermal Daily    ondansetron (ZOFRAN) injection 4 mg  4 mg Intravenous Q4H PRN    pantoprazole (PROTONIX) injection 40 mg  40 mg Intravenous Q12H BRIDGETT    sodium chloride 0 9 % with KCl 20 mEq/L infusion (premix)  125 mL/hr Intravenous Continuous       No Known Allergies    Objective     Blood pressure 122/66, pulse (!) 54, temperature 99 4 °F (37 4 °C), resp  rate 18, height 5' 5" (1 651 m), weight 70 kg (154 lb 5 2 oz), last menstrual period 03/07/2021, SpO2 98 %  Intake/Output Summary (Last 24 hours) at 3/23/2021 1109  Last data filed at 3/23/2021 0935  Gross per 24 hour   Intake 1100 ml   Output 500 ml   Net 600 ml       PHYSICAL EXAM     Physical Exam  Constitutional:       Appearance: Normal appearance  She is well-developed  HENT:      Head: Normocephalic and atraumatic  Eyes:      Conjunctiva/sclera: Conjunctivae normal    Neck:      Musculoskeletal: Normal range of motion  Cardiovascular:      Rate and Rhythm: Normal rate and regular rhythm     Pulmonary:      Effort: Pulmonary effort is normal       Breath sounds: Normal breath sounds  Abdominal:      General: Bowel sounds are normal       Palpations: Abdomen is soft  Tenderness: There is abdominal tenderness (  Epigastric)  Musculoskeletal: Normal range of motion  Skin:     General: Skin is warm and dry  Neurological:      Mental Status: She is alert and oriented to person, place, and time  Psychiatric:         Mood and Affect: Mood normal          Behavior: Behavior normal          Lab Results:   CBC:   Lab Results   Component Value Date    WBC 10 72 (H) 03/23/2021    HGB 13 2 03/23/2021    HCT 40 0 03/23/2021    MCV 87 03/23/2021     03/23/2021    MCH 28 7 03/23/2021    MCHC 33 0 03/23/2021    RDW 12 9 03/23/2021    MPV 10 2 03/23/2021    NRBC 0 03/23/2021   ,   CMP:   Lab Results   Component Value Date    K 4 0 03/23/2021     03/23/2021    CO2 25 03/23/2021    BUN 7 03/23/2021    CREATININE 0 71 03/23/2021    CALCIUM 9 0 03/23/2021    EGFR 116 03/23/2021   ,   Lipase: No results found for: LIPASE,  PT/INR: No results found for: PT, INR,   Troponin:   Lab Results   Component Value Date    TROPONINI <0 02 03/22/2021   ,   Imaging Studies: I have personally reviewed pertinent reports  US:  No acute abnormality  CT abd/pelvis:  STOMACH AND BOWEL:    Evaluation of the GI tract is limited due to lack of oral contrast material      Stomach is distended and filled with ingested food products and air  Hiatal hernia      No evidence of small bowel obstruction      The colon is relatively decompressed, limiting evaluation  Hyperdense material in the cecum, likely ingested medication         APPENDIX: Appendix segmentally visualized  No findings to suggest appendicitis           ABDOMINOPELVIC CAVITY:  There is a small volume of free pelvic fluid, likely physiologic given the patient's age and gender  No pneumoperitoneum  No lymphadenopathy  Incidental findings as described above    No acute CT abnormality to account for the patient's abdominal symptoms      If clinically warranted, follow-up GI and/or general surgery consultation is recommended

## 2021-03-23 NOTE — ASSESSMENT & PLAN NOTE
· Possibly secondary to viral gastroenteritis  · The patient was seen in consultation by Gastroenterology and underwent an EGD on 03/23/2021, which was unremarkable  · The patient is unable to tolerate any PO liquids or solid foods at time  · Continue NSS IV fluids with 20 meQ KCl at 125 ml/hr  · Attempt to advance to a clear liquid diet on 03/23/2021  · Continue protonix 40 mg IV every 12 hours  · Pain control  · Anti-emetic treatment    EGD (03/23/2021): IMPRESSION:  Normal esophagus, stomach and duodenum    Gastric biopsies were obtained     RECOMMENDATION:  Follow up pathology results

## 2021-03-23 NOTE — ASSESSMENT & PLAN NOTE
· Troponin levels were less than 0 02 x 3 sets  · The patient was seen in consultation by Cardiology  · No further cardiac ischemic work-up is indicated at this time per Cardiology  · Outpatient follow-up with Cardiology      Echo complete with contrast if indicated  Status: Final result   PACS Images    [facility] images for Echo complete with contrast if indicated   Study Result    [address]  [address], [place] [address]  [phone]     Transthoracic Echocardiogram  2D, M-mode, Doppler, and Color Doppler     Study date:  22-Mar-2021     Patient: Tana Javed  MR number: [id number]  Account number: [de-identified]  : 1990  Age: 31 years  Gender: Female  Status: Outpatient  Location: Bedside  Height: 65 in  Weight: 149 6 lb  BP: 116/ 67 mmHg     Indications: tachycardia     Diagnoses: R00 0 - Tachycardia, unspecified     Sonographer:  Wing Minh RDCS  Primary Physician:  Tia Raymundo MD  Referring Physician:  Duy Conley DO  Group:  [facility]  Interpreting Physician:  Sudhir Shane DO     SUMMARY     LEFT VENTRICLE:  Systolic function was normal  Ejection fraction was estimated to be 65 %  There were no regional wall motion abnormalities      MITRAL VALVE:  There was mild regurgitation      AORTIC VALVE:  There was trace regurgitation      TRICUSPID VALVE:  There was trace regurgitation      IVC, HEPATIC VEINS:  Respirophasic changes were blunted (less than 50% variation)      HISTORY: PRIOR HISTORY: chest pain, current smoker, asthma, shortness of breath, ventricular ectopy noted on telemetry     PROCEDURE: The procedure was performed at the bedside  This was a routine study  The transthoracic approach was used  The study included complete 2D imaging, M-mode, complete spectral Doppler, and color Doppler  Images were obtained from  the parasternal, apical, subcostal, and suprasternal notch acoustic windows   Image quality was adequate      LEFT VENTRICLE: Size was normal  Systolic function was normal  Ejection fraction was estimated to be 65 %  There were no regional wall motion abnormalities  Wall thickness was normal  DOPPLER: Left ventricular diastolic function parameters  were normal      RIGHT VENTRICLE: The size was normal  Systolic function was normal  Wall thickness was normal      LEFT ATRIUM: Size was normal      RIGHT ATRIUM: Size was normal      MITRAL VALVE: Valve structure was normal  There was normal leaflet separation  DOPPLER: The transmitral velocity was within the normal range  There was no evidence for stenosis  There was mild regurgitation      AORTIC VALVE: The valve was trileaflet  Leaflets exhibited normal thickness and normal cuspal separation  DOPPLER: Transaortic velocity was within the normal range  There was no evidence for stenosis  There was trace regurgitation      TRICUSPID VALVE: The valve structure was normal  There was normal leaflet separation  DOPPLER: The transtricuspid velocity was within the normal range  There was no evidence for stenosis  There was trace regurgitation      PULMONIC VALVE: Not well visualized      PERICARDIUM: There was no pericardial effusion   The pericardium was normal in appearance      AORTA: The root exhibited normal size      SYSTEMIC VEINS: IVC: Respirophasic changes were blunted (less than 50% variation)      SYSTEM MEASUREMENT TABLES     2D  %FS: 41 63 %  Ao Diam: 2 64 cm  EDV(Teich): 133 38 ml  EF(Teich): 72 15 %  ESV(Teich): 37 15 ml  IVSd: 0 82 cm  LA Area: 15 35 cm2  LA Diam: 3 09 cm  LVEDV MOD A4C: 113 56 ml  LVEF MOD A4C: 57 1 %  LVESV MOD A4C: 48 72 ml  LVIDd: 5 27 cm  LVIDs: 3 07 cm  LVLd A4C: 8 13 cm  LVLs A4C: 6 92 cm  LVPWd: 0 75 cm  RA Area: 12 04 cm2  RVIDd: 3 01 cm  RWT: 0 28  SV MOD A4C: 64 84 ml  SV(Teich): 96 23 ml     CW  AR Dec [place]: 1 25 m/s2  AR Dec Time: 3251 ms  AR PHT: 942 79 ms  AR Vmax: 4 05 m/s  AR maxP 67 mmHg  AV Vmax: 1 43 m/s  AV maxP 17 mmHg  PV Vmax: 0 85 m/s  PV maxP 89 mmHg  TR Vmax: 2 16 m/s  TR maxP 63 mmHg     MM  TAPSE: 2 22 cm     PW  E' Lat: 0 21 m/s  E' Sept: 0 13 m/s  E/E' Lat: 4 77  E/E' Sept: 7 82  LVOT Vmax: 0 94 m/s  LVOT maxPG: 3 56 mmHg  MV A Rene: 0 67 m/s  MV Dec [place]: 6 56 m/s2  MV DecT: 153 08 ms  MV E Rene: 1 m/s  MV E/A Ratio: 1 49  RVOT Vmax: 0 69 m/s  RVOT maxP 92 mmHg     [facility] Accredited Echocardiography Laboratory     Prepared and electronically signed by  Cristo Abdullahi DO  Signed 22-Mar-2021 16:13:08

## 2021-03-23 NOTE — ANESTHESIA PREPROCEDURE EVALUATION
Procedure:  EGD    Relevant Problems   CARDIO   (+) Chest pain   (+) Ventricular ectopy      MUSCULOSKELETAL   (+) Back pain      TTE (3/22/2021): normal biventricular systolic function, mild MR, trace AI, trace TR    Smoker, asthma    upt neg 3/21/2021         Anesthesia Plan  ASA Score- 2     Anesthesia Type- IV sedation with anesthesia with ASA Monitors  Additional Monitors:   Airway Plan:     Comment: IV sedation,  standard ASA monitors  Risks and benefits discussed with patient; patient consented and agrees to proceed  I saw and evaluated the patient  If seen with CRNA, we have discussed the anesthetic plan and I am in agreement that the plan is appropriate for the patient          Plan Factors-    Induction- intravenous  Postoperative Plan-     Informed Consent- Anesthetic plan and risks discussed with patient  I personally reviewed this patient with the CRNA  Discussed and agreed on the Anesthesia Plan with the CRNA  Christin Trujillo

## 2021-03-23 NOTE — ASSESSMENT & PLAN NOTE
· Mild leukocytosis  · Possibly secondary to viral gastroenteritis  · Follow the CBC    Results from last 7 days   Lab Units 03/23/21  0459 03/22/21  0523 03/21/21  1539   WBC Thousand/uL 10 72* 9 96 10 75*   HEMOGLOBIN g/dL 13 2 11 8 13 5   HEMATOCRIT % 40 0 36 8 40 7   PLATELETS Thousands/uL 277 262 259

## 2021-03-23 NOTE — DISCHARGE SUMMARY
[address] [id number] [place]  Discharge- [facility] 1990, 31 y o  female MRN: [id number]  Unit/Bed#: 407-01 Encounter: [id number]  Primary Care Provider: Donita Mena MD   Date and time admitted to hospital: 3/21/2021  2:35 PM    * Intractable nausea and vomiting  Assessment & Plan  · Likely secondary to viral gastroenteritis  · The patient was seen in consultation by Gastroenterology and underwent an EGD on 03/23/2021, which was unremarkable  · COVID-19 testing was negative  · Treated with continuous IV fluids, IV protonix, IV pain medications, and IV anti-emetic treatment during the hospitalization  · The patient's symptoms resolved by the time of discharge, and she was tolerating a regular solid food diet at the time of discharge   Outpatient follow-up with PCP in regards to this matter    EGD (03/23/2021): IMPRESSION:  Normal esophagus, stomach and duodenum  Gastric biopsies were obtained     RECOMMENDATION:  Follow up pathology results    Intractable abdominal pain  Assessment & Plan  · Likely secondary to viral gastroenteritis  · The patient was seen in consultation by Gastroenterology and underwent an EGD on 03/23/2021, which was unremarkable  · COVID-19 testing was negative  · Treated with continuous IV fluids, IV protonix, IV pain medications, and IV anti-emetic treatment during the hospitalization  · The patient's symptoms resolved by the time of discharge, and she was tolerating a regular solid food diet at the time of discharge   Outpatient follow-up with PCP in regards to this matter    EGD (03/23/2021): IMPRESSION:  Normal esophagus, stomach and duodenum  Gastric biopsies were obtained     RECOMMENDATION:  Follow up pathology results    Bradycardia  Assessment & Plan  · Possibly secondary to nausea, vomiting, and abdominal pain  · Also may be secondary to IV pain medications  · The patient was seen in consultation by Cardiology    · Outpatient sleep study to check for obstructive sleep apnea as the cause of bradycardia  · Outpatient follow-up with Cardiology    Chest pain  Assessment & Plan  · Troponin levels were less than 0 02 x 3 sets  · The patient was seen in consultation by Cardiology  · No further cardiac ischemic work-up is indicated at this time per Cardiology  · Outpatient follow-up with Cardiology      Echo complete with contrast if indicated  Status: Final result   PACS Images    [facility] images for Echo complete with contrast if indicated   Study Result    [address]  [place] [address], [place] [address]  [phone]     Transthoracic Echocardiogram  2D, M-mode, Doppler, and Color Doppler     Study date:  22-Mar-2021     Patient: Gerardo Joyner  MR number: [id number]  Account number: [de-identified]  : 1990  Age: 31 years  Gender: Female  Status: Outpatient  Location: Bedside  Height: 65 in  Weight: 149 6 lb  BP: 116/ 67 mmHg     Indications: tachycardia     Diagnoses: R00 0 - Tachycardia, unspecified     Sonographer:  Jerry Crump RDCS  Primary Physician:  Shannon Mcdaniel MD  Referring Physician:  Coleman Brown DO  Group:  [facility]  Interpreting Physician:  Peg Merritt DO     SUMMARY     LEFT VENTRICLE:  Systolic function was normal  Ejection fraction was estimated to be 65 %  There were no regional wall motion abnormalities      MITRAL VALVE:  There was mild regurgitation      AORTIC VALVE:  There was trace regurgitation      TRICUSPID VALVE:  There was trace regurgitation      IVC, HEPATIC VEINS:  Respirophasic changes were blunted (less than 50% variation)      HISTORY: PRIOR HISTORY: chest pain, current smoker, asthma, shortness of breath, ventricular ectopy noted on telemetry     PROCEDURE: The procedure was performed at the bedside  This was a routine study  The transthoracic approach was used  The study included complete 2D imaging, M-mode, complete spectral Doppler, and color Doppler  Images were obtained from  the parasternal, apical, subcostal, and suprasternal notch acoustic windows  Image quality was adequate      LEFT VENTRICLE: Size was normal  Systolic function was normal  Ejection fraction was estimated to be 65 %  There were no regional wall motion abnormalities  Wall thickness was normal  DOPPLER: Left ventricular diastolic function parameters  were normal      RIGHT VENTRICLE: The size was normal  Systolic function was normal  Wall thickness was normal      LEFT ATRIUM: Size was normal      RIGHT ATRIUM: Size was normal      MITRAL VALVE: Valve structure was normal  There was normal leaflet separation  DOPPLER: The transmitral velocity was within the normal range  There was no evidence for stenosis  There was mild regurgitation      AORTIC VALVE: The valve was trileaflet  Leaflets exhibited normal thickness and normal cuspal separation  DOPPLER: Transaortic velocity was within the normal range  There was no evidence for stenosis  There was trace regurgitation      TRICUSPID VALVE: The valve structure was normal  There was normal leaflet separation  DOPPLER: The transtricuspid velocity was within the normal range  There was no evidence for stenosis  There was trace regurgitation      PULMONIC VALVE: Not well visualized      PERICARDIUM: There was no pericardial effusion   The pericardium was normal in appearance      AORTA: The root exhibited normal size      SYSTEMIC VEINS: IVC: Respirophasic changes were blunted (less than 50% variation)      SYSTEM MEASUREMENT TABLES     2D  %FS: 41 63 %  Ao Diam: 2 64 cm  EDV(Teich): 133 38 ml  EF(Teich): 72 15 %  ESV(Teich): 37 15 ml  IVSd: 0 82 cm  LA Area: 15 35 cm2  LA Diam: 3 09 cm  LVEDV MOD A4C: 113 56 ml  LVEF MOD A4C: 57 1 %  LVESV MOD A4C: 48 72 ml  LVIDd: 5 27 cm  LVIDs: 3 07 cm  LVLd A4C: 8 13 cm  LVLs A4C: 6 92 cm  LVPWd: 0 75 cm  RA Area: 12 04 cm2  RVIDd: 3 01 cm  RWT: 0 28  SV MOD A4C: 64 84 ml  SV(Teich): 96 23 ml     CW  AR Dec [place]: 1 25 m/s2  AR Dec Time: 3251 ms  AR PHT: 942 79 ms  AR Vmax: 4 05 m/s  AR maxP 67 mmHg  AV Vmax: 1 43 m/s  AV maxP 17 mmHg  PV Vmax: 0 85 m/s  PV maxP 89 mmHg  TR Vmax: 2 16 m/s  TR maxP 63 mmHg     MM  TAPSE: 2 22 cm     PW  E' Lat: 0 21 m/s  E' Sept: 0 13 m/s  E/E' Lat: 4 77  E/E' Sept: 7 82  LVOT Vmax: 0 94 m/s  LVOT maxPG: 3 56 mmHg  MV A Rene: 0 67 m/s  MV Dec [place]: 6 56 m/s2  MV DecT: 153 08 ms  MV E Rene: 1 m/s  MV E/A Ratio: 1 49  RVOT Vmax: 0 69 m/s  RVOT maxP 92 mmHg     [facility]     Prepared and electronically signed by  Lady Hermelindo DO  Signed 22-Mar-2021 16:13:08           Ventricular ectopy  Assessment & Plan  · Likely secondary to hypokalemia  · The patient was seen in consultation by Cardiology    · Keep the patient's magnesium at 2 mg/dl and above in the setting of ventricular ectopy  · Keep the patient's potassium at 4 mmol/L and above in the setting of ventricular ectopy  · Recheck blood work on 2021 as an outpatient with her PCP to monitor her electrolyte levels  · Outpatient follow-up with Cardiology  · Outpatient sleep study to check for obstructive sleep apnea    Leukocytosis  Assessment & Plan  · Mild leukocytosis  · Likely secondary to viral gastroenteritis  · Follow the CBC after discharge with her PCP    Results from last 7 days   Lab Units 21  0459 21  0523 21  1539   WBC Thousand/uL 10 72* 9 96 10 75*   HEMOGLOBIN g/dL 13 2 11 8 13 5   HEMATOCRIT % 40 0 36 8 40 7   PLATELETS Thousands/uL 277 262 259         Hypokalemia  Assessment & Plan  · Resolved with potassium chloride supplementation treatment  · Follow the potassium level and magnesium level with outpatient blood work on 2021 with her PCP    Results from last 7 days   Lab Units 21  0459 21  2109 21  0523   SODIUM mmol/L 140 140 139   POTASSIUM mmol/L 4 0 3 4* 4 0   CHLORIDE mmol/L 106 106 106 CO2 mmol/L 25 24 25   BUN mg/dL 7 8 12   CREATININE mg/dL 0 71 0 77 0 71   CALCIUM mg/dL 9 0 8 5 8 6         Tobacco use  Assessment & Plan  · Recommend tobacco cessation  · A nicotine patch was utilized during the hospitalization  Discharging Physician / Practitioner: Phil oLpez DO  PCP: Deysi Presley MD  Admission Date:   Admission Orders (From admission, onward)     Ordered        03/21/21 2049  Place in Observation  Once         03/21/21 2047  Place in Observation  Once                   Discharge Date: 03/23/21      Consultations During Hospital Stay:  · Cardiology  · Gastroenterology    Procedures Performed:   · EGD on 03/23/2021:  IMPRESSION:  Normal esophagus, stomach and duodenum  Gastric biopsies were obtained     RECOMMENDATION:  Follow up pathology results    Significant Findings / Test Results:   Us Right Upper Quadrant    Result Date: 3/21/2021  Impression: No acute abnormality  Workstation performed: [id number]     CT abdomen pelvis with contrast  Status: Final result   PACS Images     Show images for CT abdomen pelvis with contrast   Study Result    CT ABDOMEN AND PELVIS WITH IV CONTRAST     INDICATION:   Nausea/vomiting;  Epigastric pain;  r/o obstruction  37-year-old female presenting for vomiting for past 2 days  Epigastric abdominal tightness  Sudden onset of abdominal pain and vomiting 2 days ago      COMPARISON:  None      TECHNIQUE:  CT examination of the abdomen and pelvis was performed  Axial, sagittal, and coronal 2D reformatted images were created from the source data and submitted for interpretation      Radiation dose length product (DLP) for this visit:  385 07 mGy-cm     This examination, like all CT scans performed in the [facility], was performed utilizing techniques to minimize radiation dose exposure, including the use of iterative   reconstruction and automated exposure control      IV Contrast:  100 mL of iohexol (OMNIPAQUE)  Enteric Contrast: Enteric contrast was not administered            FINDINGS:  ABDOMEN  LOWER CHEST:  No clinically significant abnormality identified in the visualized lower chest            LIVER/BILIARY TREE:  One or more subcentimeter sharply circumscribed low-density hepatic lesion(s) are noted, too small to accurately characterize, but statistically most likely to represent subcentimeter hepatic cysts  No suspicious solid hepatic   lesion is identified  Hepatic contours are normal   No biliary dilatation         GALLBLADDER:  No calcified gallstones  No pericholecystic inflammatory change            SPLEEN:  Unremarkable         PANCREAS:  Unremarkable         ADRENAL GLANDS:  Unremarkable         KIDNEYS/URETERS:  Unremarkable  No hydronephrosis           STOMACH AND BOWEL:    Evaluation of the GI tract is limited due to lack of oral contrast material      Stomach is distended and filled with ingested food products and air  Hiatal hernia      No evidence of small bowel obstruction      The colon is relatively decompressed, limiting evaluation  Hyperdense material in the cecum, likely ingested medication         APPENDIX: Appendix segmentally visualized  No findings to suggest appendicitis           ABDOMINOPELVIC CAVITY:  There is a small volume of free pelvic fluid, likely physiologic given the patient's age and gender  No pneumoperitoneum  No lymphadenopathy         VESSELS:  Unremarkable for patient's age               PELVIS  REPRODUCTIVE ORGANS:  Unremarkable for patient's age         URINARY BLADDER:  Unremarkable            ABDOMINAL WALL/INGUINAL REGIONS:  Unremarkable         OSSEOUS STRUCTURES:  No acute fracture or destructive osseous lesion  Spinal degenerative changes are noted               IMPRESSION:  Incidental findings as described above    No acute CT abnormality to account for the patient's abdominal symptoms      If clinically warranted, follow-up GI and/or general surgery consultation is recommended  Test Results Pending at Discharge (will require follow-up): · Biopsy results from EGD on 03/23/2021 (follow-up with Gastroenterology)     Outpatient Tests Requested:  · CBC with diff , CMP, and Magnesium level on 03/26/2021 with PCP    Complications:  None    Reason for Admission:  Intractable abdominal pain, nausea, and vomiting    Hospital Course:     Akira Nicole is a 31 y o  female patient who originally presented to the hospital on 3/21/2021 due to intractable abdominal pain, nausea, and vomiting  Please see above list of diagnoses and related plan for additional information  Condition at Discharge: good     Discharge Day Visit / Exam:     * Please refer to separate progress note for these details *    Discharge instructions/Information to patient and family:  See after visit summary for information provided to patient and family  Provisions for Follow-Up Care:  See after visit summary for information related to follow-up care and any pertinent home health orders  Disposition:     Home    Planned Readmission:  No     Discharge Statement:  I spent 25 minutes discharging the patient  This time was spent on the day of discharge  I had direct contact with the patient on the day of discharge  Greater than 50% of the total time was spent examining patient, answering all patient questions, arranging and discussing plan of care with patient as well as directly providing post-discharge instructions  Additional time then spent on discharge activities  Discharge Medications:  See after visit summary for reconciled discharge medications provided to patient and family        ** Please Note: This note has been constructed using a voice recognition system **

## 2021-03-23 NOTE — ASSESSMENT & PLAN NOTE
· Likely secondary to viral gastroenteritis  · The patient was seen in consultation by Gastroenterology and underwent an EGD on 03/23/2021, which was unremarkable  · COVID-19 testing was negative  · Treated with continuous IV fluids, IV protonix, IV pain medications, and IV anti-emetic treatment during the hospitalization  · The patient's symptoms resolved by the time of discharge, and she was tolerating a regular solid food diet at the time of discharge   Outpatient follow-up with PCP in regards to this matter    EGD (03/23/2021): IMPRESSION:  Normal esophagus, stomach and duodenum    Gastric biopsies were obtained     RECOMMENDATION:  Follow up pathology results

## 2021-03-23 NOTE — NURSING NOTE
At 0827 patient medicated with zofran for complaints of nausea  Upon reassessment moderate relief of nausea obtained  Also at [address] [facility] patient was given her proventil inhaler for complaints of wheezing, lung assessment after inhaler was clear bilaterally

## 2021-03-23 NOTE — ASSESSMENT & PLAN NOTE
· Likely secondary to hypokalemia  · Telemetry monitoring  · The patient was seen in consultation by Cardiology    · Keep the patient's magnesium at 2 mg/dl and above in the setting of ventricular ectopy  · Keep the patient's potassium at 4 mmol/L and above in the setting of ventricular ectopy

## 2021-03-23 NOTE — DISCHARGE INSTRUCTIONS
Gastroenteritis   WHAT YOU NEED TO KNOW:   Gastroenteritis, or stomach flu, is an infection of the stomach and intestines  DISCHARGE INSTRUCTIONS:   Call 911 for any of the following:   · You have trouble breathing or a very fast pulse  Seek care immediately if:   · You see blood in your diarrhea  · You cannot stop vomiting  · You have not urinated for 12 hours  · You feel like you are going to faint  Contact your healthcare provider if:   · You have a fever  · You continue to vomit or have diarrhea, even after treatment  · You see worms in your diarrhea  · Your mouth or eyes are dry  You are not urinating as much or as often  · You have questions or concerns about your condition or care  Medicines:   · Medicines  may be given to stop vomiting or diarrhea, decrease abdominal cramps, or treat an infection  · Take your medicine as directed  Contact your healthcare provider if you think your medicine is not helping or if you have side effects  Tell him or her if you are allergic to any medicine  Keep a list of the medicines, vitamins, and herbs you take  Include the amounts, and when and why you take them  Bring the list or the pill bottles to follow-up visits  Carry your medicine list with you in case of an emergency  Manage your symptoms:   · Drink liquids as directed  Ask your healthcare provider how much liquid to drink each day, and which liquids are best for you  You may also need to drink an oral rehydration solution (ORS)  An ORS has the right amounts of sugar, salt, and minerals in water to replace body fluids  · Eat bland foods  When you feel hungry, begin eating soft, bland foods  Examples are bananas, clear soup, potatoes, and applesauce  Do not have dairy products, alcohol, sugary drinks, or drinks with caffeine until you feel better  · Rest as much as possible  Slowly start to do more each day when you begin to feel better      Prevent the spread of gastroenteritis:  Gastroenteritis can spread easily  Keep yourself, your family, and your surroundings clean to help prevent the spread of gastroenteritis:  · Wash your hands often  Use soap and water  Wash your hands after you use the bathroom, change a child's diapers, or sneeze  Wash your hands before you prepare or eat food  · Clean surfaces and do laundry often  Wash your clothes and towels separately from the rest of the laundry  Clean surfaces in your home with antibacterial  or bleach  · Clean food thoroughly and cook safely  Wash raw vegetables before you cook  Cook meat, fish, and eggs fully  Do not use the same dishes for raw meat as you do for other foods  Refrigerate any leftover food immediately  · Be aware when you camp or travel  Drink only clean water  Do not drink from rivers or lakes unless you purify or boil the water first  When you travel, drink bottled water and do not add ice  Do not eat fruit that has not been peeled  Do not eat raw fish or meat that is not fully cooked  Follow up with your healthcare provider as directed:  Write down your questions so you remember to ask them during your visits  © Copyright [facility] [address] Information is for End User's use only and may not be sold, redistributed or otherwise used for commercial purposes  All illustrations and images included in CareNotes® are the copyrighted property of [employer]  or [facility] Melissa Amado   The above information is an  only  It is not intended as medical advice for individual conditions or treatments  Talk to your doctor, nurse or pharmacist before following any medical regimen to see if it is safe and effective for you  Gastroenteritis   WHAT YOU NEED TO KNOW:   What is gastroenteritis? Gastroenteritis, or stomach flu, is an infection of the stomach and intestines  It is caused by bacteria, parasites, or viruses       What increases my risk for gastroenteritis? · Close contact with an infected person or animal    · Food poisoning, such as from eggs, raw vegetables, shellfish, or meat that is not fully cooked    · Drinking water that is not clean, such as when you camp or travel    What are the signs and symptoms of gastroenteritis? · Diarrhea or gas    · Nausea, vomiting, or poor appetite    · Abdominal cramps, pain, or gurgling    · Fever    · Tiredness or weakness    · Headaches or muscle aches with any of the above symptoms    How is gastroenteritis diagnosed and treated? Your healthcare provider will examine you  He will check for signs of dehydration  He will ask you how often you are vomiting or have diarrhea  You may need a blood or bowel movement sample tested for the germ causing your gastroenteritis  Gastroenteritis often clears up on its own  Medicines may be given to slow or stop your diarrhea or vomiting  You may also need medicines to treat an infection caused by bacteria or a parasite  How can I manage my gastroenteritis? · Drink liquids as directed  Ask your healthcare provider how much liquid to drink each day, and which liquids are best for you  You may also need to drink an oral rehydration solution (ORS)  An ORS has the right amounts of sugar, salt, and minerals in water to replace body fluids  · Eat bland foods  When you feel hungry, begin eating soft, bland foods  Examples are bananas, clear soup, potatoes, and applesauce  Do not have dairy products, alcohol, sugary drinks, or drinks with caffeine until you feel better  · Rest as much as possible  Slowly start to do more each day when you begin to feel better  How can I prevent gastroenteritis? Gastroenteritis can spread easily  Keep yourself, your family, and your surroundings clean to help prevent the spread of gastroenteritis:  · Wash your hands often  Use soap and water  Wash your hands after you use the bathroom, change a child's diapers, or sneeze   Wash your hands before you prepare or eat food  · Clean surfaces and do laundry often  Wash your clothes and towels separately from the rest of the laundry  Clean surfaces in your home with antibacterial  or bleach  · Clean food thoroughly and cook safely  Wash raw vegetables before you cook  Cook meat, fish, and eggs fully  Do not use the same dishes for raw meat as you do for other foods  Refrigerate any leftover food immediately  · Be aware when you camp or travel  Drink only clean water  Do not drink from rivers or lakes unless you purify or boil the water first  When you travel, drink bottled water and do not add ice  Do not eat fruit that has not been peeled  Do not eat raw fish or meat that is not fully cooked  Call 911 for any of the following:   · You have trouble breathing or a very fast pulse  When should I seek immediate care? · You see blood in your diarrhea  · You cannot stop vomiting  · You have not urinated for 12 hours  · You feel like you are going to faint  When should I contact my healthcare provider? · You have a fever  · You continue to vomit or have diarrhea, even after treatment  · You see worms in your diarrhea  · Your mouth or eyes are dry  You are not urinating as much or as often  · You have questions or concerns about your condition or care  CARE AGREEMENT:   You have the right to help plan your care  Learn about your health condition and how it may be treated  Discuss treatment options with your healthcare providers to decide what care you want to receive  You always have the right to refuse treatment  The above information is an  only  It is not intended as medical advice for individual conditions or treatments  Talk to your doctor, nurse or pharmacist before following any medical regimen to see if it is safe and effective for you    © Copyright [employer] 2020 Information is for End User's use only and may not be sold, redistributed or otherwise used for commercial purposes  All illustrations and images included in CareNotes® are the copyrighted property of A D A M , Inc  or [address] Melissa Amado                   Cigarette Smoking and [facility]:   What are the risks to my health if I smoke tobacco?  Nicotine and other chemicals found in tobacco and e-cigarettes can damage every cell in your body  Even if you are a light smoker, you have an increased risk for cancer, heart disease, and lung disease  If you are pregnant or have diabetes, smoking increases your risk for complications  Nicotine can affect an adolescent's developing brain  This can lead to trouble thinking, learning, or paying attention  What are the benefits to my health if I stop smoking? · You decrease respiratory symptoms such as coughing, wheezing, and shortness of breath  · You reduce your risk for cancers of the lung, mouth, throat, kidney, bladder, pancreas, stomach, and cervix  If you already have cancer, you increase the benefits of chemotherapy  You also reduce your risk for cancer returning or a second cancer from developing  · You reduce your risk for heart disease, blood clots, heart attack, and stroke  · You reduce your risk for lung infections, and diseases such as pneumonia, asthma, chronic bronchitis, and emphysema  · Your circulation improves  More oxygen can be delivered to your body  If you have diabetes, you lower your risk for complications, such as kidney, artery, and eye diseases  You also lower your risk for nerve damage  Nerve damage can lead to amputations, poor vision, and blindness  · You improve your body's ability to heal and to fight infections  · An adolescent can help his or her brain and body develop in a healthy way  Talk to your adolescent about all the health risks of nicotine  If you can, start talking about nicotine when your child is younger than 12 years   This may make it easier for him or her not to start using nicotine as a teenager or adult  Explain to him or her that it is best never to start  It can be hard to try to quit later  What are the health benefits to others if I stop smoking? Tobacco is harmful to nonsmokers who breathe in your secondhand smoke  The following are ways the health of others around you may improve when you stop smoking:  · You lower the risks for lung cancer and heart disease in nonsmoking adults  · If you are pregnant, you lower the risk for miscarriage, early delivery, low birth weight, and stillbirth  You also lower your baby's risk for SIDS, obesity, developmental delay, and neurobehavioral problems, such as ADHD  · If you have children, you lower their risk for ear infections, colds, pneumonia, bronchitis, and asthma  Where can I find more information and support to stop smoking? · [employer]  Phone: 1- 657 - 037-1339  Web Address: www [employer]    CARE AGREEMENT:   You have the right to help plan your care  Learn about your health condition and how it may be treated  Discuss treatment options with your healthcare providers to decide what care you want to receive  You always have the right to refuse treatment  The above information is an  only  It is not intended as medical advice for individual conditions or treatments  Talk to your doctor, nurse or pharmacist before following any medical regimen to see if it is safe and effective for you  © Copyright [address] Information is for End User's use only and may not be sold, redistributed or otherwise used for commercial purposes  All illustrations and images included in CareNotes® are the copyrighted property of [employer]  or [employer]      How to Stop Smoking   WHAT YOU NEED TO KNOW:   You will improve your health and the health of others around you if you stop smoking   Your risk for heart and lung disease, cancer, stroke, heart attack, and vision problems will also decrease  Your adolescent can help prevent or stop harm to his or her brain or body  This will help him or her become a healthy adult  You can benefit from quitting no matter how long you have smoked  DISCHARGE INSTRUCTIONS:   Prepare to stop smoking:  Nicotine is a highly addictive drug found in cigarettes  Withdrawal symptoms can happen when you stop smoking and make it hard to quit  These include anxiety, depression, irritability, trouble sleeping, and increased appetite  You increase your chances of success if you prepare to quit  · Set a quit date  [place] a date that is within the next 2 weeks  Do not pick a day that you think may be stressful or busy  Write down the day or [place] it on your calender  · Tell friends and family that you plan to quit  Explain that you may have withdrawal symptoms when you try to quit  Ask them to support you  They may be able to encourage you and help reduce your stress to make it easier for you to quit  · Make a list of your reasons for quitting  Put the list somewhere you will see it every day, such as your refrigerator  You can look at the list when you have a craving  · Remove all tobacco and nicotine products from your home, car, and workplace  Also, remove anything else that will tempt you to smoke, such as lighters, matches, or ashtrays  Clean your car, home, and places at work that smell like smoke  The smell of smoke can trigger a craving  · Identify triggers that make you want to smoke  This may include activities, feelings, or people  Also write down 1 way you can deal with each of your triggers  For example, if you want to smoke as soon as you wake up, plan another activity during this time, such as exercise  · Make a plan for how you will quit  Learn about the tools that can help you quit, such as medicine, counseling, or nicotine replacement therapy  Choose at least 2 options to help you quit      · Help your adolescent make a plan to quit  The plan will be more successful if your adolescent makes his or her own decisions  Do not try to pressure him or her to quit immediately or in a certain way  Be supportive and offer help if needed  Tools to help you stop smoking:   · Counseling  from a trained healthcare provider can provide you with support and skills to quit smoking  The provider will also teach you to manage your withdrawal symptoms and cravings  You may receive counseling from one counselor, in group therapy, or through phone therapy called a quit line  · Nicotine replacement therapy (NRT)  such as nicotine patches, gum, or lozenges may help reduce your nicotine cravings  You may get these without a doctor's order  Do not use e-cigarettes or smokeless tobacco in place of cigarettes or to help you quit  They still contain nicotine  · Prescription medicines  such as nasal sprays or nicotine inhalers may help reduce your withdrawal symptoms  Other medicines may also be used to reduce your urge to smoke  Ask your healthcare provider about these medicines  You may need to start certain medicines 2 weeks before your quit date for them to work well  · Hypnosis  is a practice that helps guide you through thoughts and feelings  Hypnosis may help decrease your cravings and make you more willing to quit  · Acupuncture therapy  uses very thin needles to balance energy channels in the body  This is thought to help decrease cravings and symptoms of nicotine withdrawal     · Support groups  let you talk to others who are trying to quit or have already quit  It may be helpful to speak with others about how they quit  Manage your cravings:   · Avoid situations, people, and places that tempt you to smoke  Go to nonsmoking places, such as libraries or restaurants  Understand what tempts you and try to avoid these things  · Keep your hands busy  Hold things such as a stress ball or pen      · Put candy or toothpicks in your mouth  Keep lollipops, sugarless gum, or toothpicks with you at all times  · Do not have alcohol or caffeine  These drinks may tempt you to smoke  Drink healthy liquids such as water or juice instead  · Reward yourself when you resist your cravings  Rewards will motivate you and help you stay positive  · Do an activity that distracts you from your craving  Examples include cleaning, creating art, or gardening  Prevent weight gain after you quit:  You may gain a few pounds after you quit smoking  It is healthier for you to gain a few pounds than to continue to smoke  The following can help you prevent weight gain:  · Eat healthy foods  These include fruits, vegetables, whole-grain breads, low-fat dairy products, beans, lean meats, and fish  Eat healthy snacks, such as low-fat yogurt, if you get hungry between meals  · Drink water before, during, and between meals  This will make your stomach feel full and help prevent you from overeating  Ask your healthcare provider how much liquid to drink each day and which liquids are best for you  · Be physically active  Activity may help reduce your cravings and reduce stress  Take a walk or do some kind of physical activity every day  Ask your healthcare provider which activities are right for you  For support and more information:   · [employer]  gov  Phone: 7- 438 - 371-2568  Web Address: www [employer]  [address] Information is for End User's use only and may not be sold, redistributed or otherwise used for commercial purposes  All illustrations and images included in CareNotes® are the copyrighted property of [employer]  or [facility] Melissa Amado   The above information is an  only  It is not intended as medical advice for individual conditions or treatments  Talk to your doctor, nurse or pharmacist before following any medical regimen to see if it is safe and effective for you        How to Stop Smoking   WHAT YOU NEED TO KNOW:   Why should I stop smoking? You will improve your health and the health of others around you if you stop smoking  Your risk for heart and lung disease, cancer, stroke, heart attack, and vision problems will also decrease  Your adolescent can help prevent or stop harm to his or her brain or body  This will help him or her become a healthy adult  You can benefit from quitting no matter how long you have smoked  How can I prepare to stop smoking? Nicotine is a highly addictive drug found in cigarettes  Withdrawal symptoms can happen when you stop smoking and make it hard to quit  These include anxiety, depression, irritability, trouble sleeping, and increased appetite  You increase your chances of success if you prepare to quit  · Set a quit date  [place] a date that is within the next 2 weeks  Do not pick a day that you think may be stressful or busy  Write down the day or [place] it on your calender  · Tell friends and family that you plan to quit  Explain that you may have withdrawal symptoms when you try to quit  Ask them to support you  They may be able to encourage you and help reduce your stress to make it easier for you to quit  · Make a list of your reasons for quitting  Put the list somewhere you will see it every day, such as your refrigerator  You can look at the list when you have a craving  · Remove all tobacco and nicotine products from your home, car, and workplace  Also, remove anything else that will tempt you to smoke, such as lighters, matches, or ashtrays  Clean your car, home, and places at work that smell like smoke  The smell of smoke can trigger a craving  · Identify triggers that make you want to smoke  This may include activities, feelings, or people  Also write down 1 way you can deal with each of your triggers  For example, if you want to smoke as soon as you wake up, plan another activity during this time, such as exercise      · Make a plan for how you will quit  Learn about the tools that can help you quit, such as medicine, counseling, or nicotine replacement therapy  Choose at least 2 options to help you quit  · Help your adolescent make a plan to quit  The plan will be more successful if your adolescent makes his or her own decisions  Do not try to pressure him or her to quit immediately or in a certain way  Be supportive and offer help if needed  What are some tools to help me stop smoking? · Counseling  from a trained healthcare provider can provide you with support and skills to quit smoking  The provider will also teach you to manage your withdrawal symptoms and cravings  You may receive counseling from one counselor, in group therapy, or through phone therapy called a quit line  · Nicotine replacement therapy (NRT)  such as nicotine patches, gum, or lozenges may help reduce your nicotine cravings  You may get these without a doctor's order  Do not use e-cigarettes or smokeless tobacco in place of cigarettes or to help you quit  They still contain nicotine  · Prescription medicines  such as nasal sprays or nicotine inhalers may help reduce your withdrawal symptoms  Other medicines may also be used to reduce your urge to smoke  Ask your healthcare provider about these medicines  You may need to start certain medicines 2 weeks before your quit date for them to work well  · Hypnosis  is a practice that helps guide you through thoughts and feelings  Hypnosis may help decrease your cravings and make you more willing to quit  · Acupuncture therapy  uses very thin needles to balance energy channels in the body  This is thought to help decrease cravings and symptoms of nicotine withdrawal     · Support groups  let you talk to others who are trying to quit or have already quit  It may be helpful to speak with others about how they quit  How can I manage my cravings?    · Avoid situations, people, and places that tempt you to smoke   Go to nonsmoking places, such as libraries or restaurants  Understand what tempts you and try to avoid these things  · Keep your hands busy  Hold things such as a stress ball or pen  · Put candy or toothpicks in your mouth  Keep lollipops, sugarless gum, or toothpicks with you at all times  · Do not have alcohol or caffeine  These drinks may tempt you to smoke  Drink healthy liquids such as water or juice instead  · Reward yourself when you resist your cravings  Rewards will motivate you and help you stay positive  · Do an activity that distracts you from your craving  Examples include cleaning, creating art, or gardening  What should I know about weight gain after I quit? You may gain a few pounds after you quit smoking  It is healthier for you to gain a few pounds than to continue to smoke  The following can help you prevent weight gain:  · Eat healthy foods  These include fruits, vegetables, whole-grain breads, low-fat dairy products, beans, lean meats, and fish  Eat healthy snacks, such as low-fat yogurt, if you get hungry between meals  · Drink water before, during, and between meals  This will make your stomach feel full and help prevent you from overeating  Ask your healthcare provider how much liquid to drink each day and which liquids are best for you  · Be physically active  Activity may help reduce your cravings and reduce stress  Take a walk or do some kind of physical activity every day  Ask your healthcare provider which activities are right for you  Where can I find support and more information? · [employer]  [employer]  Phone: 0- 534 - 975-5044  Web Address: www [employer]    CARE AGREEMENT:   You have the right to help plan your care  Learn about your health condition and how it may be treated  Discuss treatment options with your healthcare providers to decide what care you want to receive  You always have the right to refuse treatment   The above information is an  only  It is not intended as medical advice for individual conditions or treatments  Talk to your doctor, nurse or pharmacist before following any medical regimen to see if it is safe and effective for you  © Copyright [address] Information is for End User's use only and may not be sold, redistributed or otherwise used for commercial purposes   All illustrations and images included in CareNotes® are the copyrighted property of A D A M , Inc  or [address] [place] [address]

## 2021-03-23 NOTE — PROGRESS NOTES
[address]  Progress Note - Odetta Ganser 1990, 31 y o  female MRN: [id number]  Unit/Bed#: 407-01 Encounter: [id number]  Primary Care Provider: Deysi Presley MD   Date and time admitted to hospital: 3/21/2021  2:35 PM    * Intractable nausea and vomiting  Assessment & Plan  · Possibly secondary to viral gastroenteritis  · The patient was seen in consultation by Gastroenterology and underwent an EGD on 03/23/2021, which was unremarkable  · The patient is unable to tolerate any PO liquids or solid foods at time  · Continue NSS IV fluids with 20 meQ KCl at 125 ml/hr  · Attempt to advance to a clear liquid diet on 03/23/2021  · Continue protonix 40 mg IV every 12 hours  · Pain control  · Anti-emetic treatment  · COVID-19 testing was negative  EGD (03/23/2021): IMPRESSION:  Normal esophagus, stomach and duodenum  Gastric biopsies were obtained     RECOMMENDATION:  Follow up pathology results    Intractable abdominal pain  Assessment & Plan  · Possibly secondary to viral gastroenteritis  · The patient was seen in consultation by Gastroenterology and underwent an EGD on 03/23/2021, which was unremarkable  · The patient is unable to tolerate any PO liquids or solid foods at time  · Continue NSS IV fluids with 20 meQ KCl at 125 ml/hr  · Attempt to advance to a clear liquid diet on 03/23/2021  · Continue protonix 40 mg IV every 12 hours  · Pain control  · Anti-emetic treatment    EGD (03/23/2021): IMPRESSION:  Normal esophagus, stomach and duodenum  Gastric biopsies were obtained     RECOMMENDATION:  Follow up pathology results    Bradycardia  Assessment & Plan  · Possibly secondary to nausea, vomiting, and abdominal pain  · Also may be secondary to IV pain medications  · The patient was seen in consultation by Cardiology    · Continue to monitor on telemetry    Chest pain  Assessment & Plan  · Troponin levels were less than 0 02 x 3 sets  · The patient was seen in consultation by Cardiology  · No further cardiac ischemic work-up is indicated at this time per Cardiology      Echo complete with contrast if indicated  Status: Final result   PACS Images    [facility] images for Echo complete with contrast if indicated   Study Result    [address]  [address], [place] [address]  [phone]     Transthoracic Echocardiogram  2D, M-mode, Doppler, and Color Doppler     Study date:  22-Mar-2021     Patient: Ginyn Brunson  MR number: [id number]  Account number: [de-identified]  : 1990  Age: 31 years  Gender: Female  Status: Outpatient  Location: Bedside  Height: 65 in  Weight: 149 6 lb  BP: 116/ 67 mmHg     Indications: tachycardia     Diagnoses: R00 0 - Tachycardia, unspecified     Sonographer:  Amber Scott RDCS  Primary Physician:  Donita Smalls MD  Referring Physician:  Joe Simmons DO  Group:  [facility]  Interpreting Physician:  Christiano Young DO     SUMMARY     LEFT VENTRICLE:  Systolic function was normal  Ejection fraction was estimated to be 65 %  There were no regional wall motion abnormalities      MITRAL VALVE:  There was mild regurgitation      AORTIC VALVE:  There was trace regurgitation      TRICUSPID VALVE:  There was trace regurgitation      IVC, HEPATIC VEINS:  Respirophasic changes were blunted (less than 50% variation)      HISTORY: PRIOR HISTORY: chest pain, current smoker, asthma, shortness of breath, ventricular ectopy noted on telemetry     PROCEDURE: The procedure was performed at the bedside  This was a routine study  The transthoracic approach was used  The study included complete 2D imaging, M-mode, complete spectral Doppler, and color Doppler  Images were obtained from  the parasternal, apical, subcostal, and suprasternal notch acoustic windows  Image quality was adequate      LEFT VENTRICLE: Size was normal  Systolic function was normal  Ejection fraction was estimated to be 65 %   There were no regional wall motion abnormalities  Wall thickness was normal  DOPPLER: Left ventricular diastolic function parameters  were normal      RIGHT VENTRICLE: The size was normal  Systolic function was normal  Wall thickness was normal      LEFT ATRIUM: Size was normal      RIGHT ATRIUM: Size was normal      MITRAL VALVE: Valve structure was normal  There was normal leaflet separation  DOPPLER: The transmitral velocity was within the normal range  There was no evidence for stenosis  There was mild regurgitation      AORTIC VALVE: The valve was trileaflet  Leaflets exhibited normal thickness and normal cuspal separation  DOPPLER: Transaortic velocity was within the normal range  There was no evidence for stenosis  There was trace regurgitation      TRICUSPID VALVE: The valve structure was normal  There was normal leaflet separation  DOPPLER: The transtricuspid velocity was within the normal range  There was no evidence for stenosis  There was trace regurgitation      PULMONIC VALVE: Not well visualized      PERICARDIUM: There was no pericardial effusion   The pericardium was normal in appearance      AORTA: The root exhibited normal size      SYSTEMIC VEINS: IVC: Respirophasic changes were blunted (less than 50% variation)      SYSTEM MEASUREMENT TABLES     2D  %FS: 41 63 %  Ao Diam: 2 64 cm  EDV(Teich): 133 38 ml  EF(Teich): 72 15 %  ESV(Teich): 37 15 ml  IVSd: 0 82 cm  LA Area: 15 35 cm2  LA Diam: 3 09 cm  LVEDV MOD A4C: 113 56 ml  LVEF MOD A4C: 57 1 %  LVESV MOD A4C: 48 72 ml  LVIDd: 5 27 cm  LVIDs: 3 07 cm  LVLd A4C: 8 13 cm  LVLs A4C: 6 92 cm  LVPWd: 0 75 cm  RA Area: 12 04 cm2  RVIDd: 3 01 cm  RWT: 0 28  SV MOD A4C: 64 84 ml  SV(Teich): 96 23 ml     CW  AR Dec [place]: 1 25 m/s2  AR Dec Time: 3251 ms  AR PHT: 942 79 ms  AR Vmax: 4 05 m/s  AR maxP 67 mmHg  AV Vmax: 1 43 m/s  AV maxP 17 mmHg  PV Vmax: 0 85 m/s  PV maxP 89 mmHg  TR Vmax: 2 16 m/s  TR maxP 63 mmHg     MM  TAPSE: 2 22 cm     PW  E' Lat: 0 21 m/s  E' Sept: 0 13 m/s  E/E' Lat: 4 77  E/E' Sept: 7 82  LVOT Vmax: 0 94 m/s  LVOT maxPG: 3 56 mmHg  MV A Rene: 0 67 m/s  MV Dec [place]: 6 56 m/s2  MV DecT: 153 08 ms  MV E [place]: 1 m/s  MV E/A Ratio: 1 49  RVOT Vmax: 0 69 m/s  RVOT maxP 92 mmHg     [facility]     Prepared and electronically signed by  Mohinder Finley DO  Signed 22-Mar-2021 16:13:08           Ventricular ectopy  Assessment & Plan  · Likely secondary to hypokalemia  · Telemetry monitoring  · The patient was seen in consultation by Cardiology  · Keep the patient's magnesium at 2 mg/dl and above in the setting of ventricular ectopy  · Keep the patient's potassium at 4 mmol/L and above in the setting of ventricular ectopy    Leukocytosis  Assessment & Plan  · Mild leukocytosis  · Possibly secondary to viral gastroenteritis  · Follow the CBC    Results from last 7 days   Lab Units 21  04521  0523 21  1539   WBC Thousand/uL 10 72* 9 96 10 75*   HEMOGLOBIN g/dL 13 2 11 8 13 5   HEMATOCRIT % 40 0 36 8 40 7   PLATELETS Thousands/uL 277 262 259         Hypokalemia  Assessment & Plan  · Resolved with potassium chloride supplementation treatment  · Follow the potassium level and magnesium level    Results from last 7 days   Lab Units 21  0459 21  2109 21  0523   SODIUM mmol/L 140 140 139   POTASSIUM mmol/L 4 0 3 4* 4 0   CHLORIDE mmol/L 106 106 106   CO2 mmol/L 25 24 25   BUN mg/dL 7 8 12   CREATININE mg/dL 0 71 0 77 0 71   CALCIUM mg/dL 9 0 8 5 8 6         Tobacco use  Assessment & Plan  · Recommend tobacco cessation  · Nicotine replacement patch ordered      VTE Pharmacologic Prophylaxis:   Pharmacologic: Enoxaparin (Lovenox)  Mechanical VTE Prophylaxis in Place: Yes    Patient Centered Rounds: I have performed bedside rounds with nursing staff today      Discussions with Specialists or Other Care Team Provider: I discussed the case with Gastroenterology  Time Spent for Care: 30 minutes  More than 50% of total time spent on counseling and coordination of care as described above  Current Length of Stay: 0 day(s)    Current Patient Status: Observation   Certification Statement: The patient will continue to require additional inpatient hospital stay due to the patient not being able to tolerate any PO intake, the need for continuous IV fluids, for IV pain medications, and for IV anti-emetic treatment  Code Status: Level 1 - Full Code      Subjective: The patient was seen and examined  The patient continues to experience persistent nausea and vomiting  She also continues to experience severe, epigastric abdominal pain  Objective:     Vitals:   Temp (24hrs), Av 6 °F (37 °C), Min:97 9 °F (36 6 °C), Max:99 4 °F (37 4 °C)    Temp:  [97 9 °F (36 6 °C)-99 4 °F (37 4 °C)] 98 6 °F (37 °C)  HR:  [50-92] 80  Resp:  [16-20] 16  BP: ()/(45-68) 112/63  SpO2:  [95 %-100 %] 99 %  Body mass index is 25 68 kg/m²  Input and Output Summary (last 24 hours):        Intake/Output Summary (Last 24 hours) at 3/23/2021 1300  Last data filed at 3/23/2021 1202  Gross per 24 hour   Intake 1450 ml   Output 200 ml   Net 1250 ml       Physical Exam:     Physical Exam  General:  NAD, follows commands  HEENT:  NC/AT, mucous membranes dry  Neck:  Supple, No JVP elevation  CV:  + S1, + S2, Intermittent bradycardia, Regular rhythm  Pulm:  Lung fields are CTA bilaterally  Abd:  Soft, Epigastric abdominal pain with palpation, Non-distended  Ext:  No clubbing/cyanosis/edema  Skin:  No rashes  Neuro:  Awake, alert, oriented  Psych:  Normal mood and affect      Additional Data:    Labs:    Results from last 7 days   Lab Units 21  5509   WBC Thousand/uL 10 72*   HEMOGLOBIN g/dL 13 2   HEMATOCRIT % 40 0   PLATELETS Thousands/uL 277   NEUTROS PCT % 68   LYMPHS PCT % 23   MONOS PCT % 8   EOS PCT % 0     Results from last 7 days   Lab Units 21  0701 03/22/21  0523   SODIUM mmol/L 140   < > 139   POTASSIUM mmol/L 4 0   < > 4 0   CHLORIDE mmol/L 106   < > 106   CO2 mmol/L 25   < > 25   BUN mg/dL 7   < > 12   CREATININE mg/dL 0 71   < > 0 71   ANION GAP mmol/L 9   < > 8   CALCIUM mg/dL 9 0   < > 8 6   ALBUMIN g/dL  --   --  3 5   TOTAL BILIRUBIN mg/dL  --   --  0 50   ALK PHOS U/L  --   --  50   ALT U/L  --   --  27   AST U/L  --   --  18   GLUCOSE RANDOM mg/dL 91   < > 99    < > = values in this interval not displayed  Results from last 7 days   Lab Units 03/23/21  0656   POC GLUCOSE mg/dl 82                   * I Have Reviewed All Lab Data Listed Above  * Additional Pertinent Lab Tests Reviewed: [facility] Reviewed      Recent Cultures (last 7 days):           Last 24 Hours Medication List:   Current Facility-Administered Medications   Medication Dose Route Frequency Provider Last Rate    acetaminophen  650 mg Oral Q6H PRN GERALD Mccarthy      al mag oxide-diphenhydramine-lidocaine viscous  10 mL Swish & Swallow Q4H PRN Martita Carrillo PA-C      albuterol  2 puff Inhalation Q4H PRN Nazia Irvin DO      enoxaparin  40 mg Subcutaneous Q24H Armando Edgar DO      oxyCODONE  5 mg Oral Q4H PRN Armando Edgar DO      Or    HYDROmorphone  0 5 mg Intravenous Q4H PRN Armando Edgar, DO      metoclopramide  10 mg Intravenous Q6H PRN GERALD Mccarthy      nicotine  1 patch Transdermal Daily GERALD Gomes      ondansetron  4 mg Intravenous Q4H PRN Martita Carrillo PA-C      pantoprazole  40 mg Intravenous Q12H [address] Martita Carrillo PA-C      sodium chloride 0 9 % with KCl 20 mEq/L  125 mL/hr Intravenous Continuous GERALD Mccarthy 125 mL/hr (03/23/21 0418)        Today, Patient Was Seen By: Armando Edgar DO    ** Please Note: Dictation voice to text software may have been used in the creation of this document   **

## 2021-03-23 NOTE — ASSESSMENT & PLAN NOTE
· Possibly secondary to viral gastroenteritis  · The patient was seen in consultation by Gastroenterology and underwent an EGD on 03/23/2021, which was unremarkable  · The patient is unable to tolerate any PO liquids or solid foods at time  · Continue NSS IV fluids with 20 meQ KCl at 125 ml/hr  · Attempt to advance to a clear liquid diet on 03/23/2021  · Continue protonix 40 mg IV every 12 hours  · Pain control  · Anti-emetic treatment  · COVID-19 testing was negative  EGD (03/23/2021): IMPRESSION:  Normal esophagus, stomach and duodenum    Gastric biopsies were obtained     RECOMMENDATION:  Follow up pathology results

## 2021-03-23 NOTE — ASSESSMENT & PLAN NOTE
· Troponin levels were less than 0 02 x 3 sets  · The patient was seen in consultation by Cardiology  · No further cardiac ischemic work-up is indicated at this time per Cardiology      Echo complete with contrast if indicated  Status: Final result   PACS Images    [facility] images for Echo complete with contrast if indicated   Study Result    [address]  [place] [address]  [phone]     Transthoracic Echocardiogram  2D, M-mode, Doppler, and Color Doppler     Study date:  22-Mar-2021     Patient: Piper Chacon  MR number: [id number]  Account number: [de-identified]  : 1990  Age: 31 years  Gender: Female  Status: Outpatient  Location: Bedside  Height: 65 in  Weight: 149 6 lb  BP: 116/ 67 mmHg     Indications: tachycardia     Diagnoses: R00 0 - Tachycardia, unspecified     Sonographer:  Rachel Bains RDCS  Primary Physician:  Angelica Cook MD  Referring Physician:  Pineda Daniels DO  Group:  [id number]  Interpreting Physician:  Bryon Burton DO     SUMMARY     LEFT VENTRICLE:  Systolic function was normal  Ejection fraction was estimated to be 65 %  There were no regional wall motion abnormalities      MITRAL VALVE:  There was mild regurgitation      AORTIC VALVE:  There was trace regurgitation      TRICUSPID VALVE:  There was trace regurgitation      IVC, HEPATIC VEINS:  Respirophasic changes were blunted (less than 50% variation)      HISTORY: PRIOR HISTORY: chest pain, current smoker, asthma, shortness of breath, ventricular ectopy noted on telemetry     PROCEDURE: The procedure was performed at the bedside  This was a routine study  The transthoracic approach was used  The study included complete 2D imaging, M-mode, complete spectral Doppler, and color Doppler  Images were obtained from  the parasternal, apical, subcostal, and suprasternal notch acoustic windows   Image quality was adequate      LEFT VENTRICLE: Size was normal  Systolic function was normal  Ejection fraction was estimated to be 65 %  There were no regional wall motion abnormalities  Wall thickness was normal  DOPPLER: Left ventricular diastolic function parameters  were normal      RIGHT VENTRICLE: The size was normal  Systolic function was normal  Wall thickness was normal      LEFT ATRIUM: Size was normal      RIGHT ATRIUM: Size was normal      MITRAL VALVE: Valve structure was normal  There was normal leaflet separation  DOPPLER: The transmitral velocity was within the normal range  There was no evidence for stenosis  There was mild regurgitation      AORTIC VALVE: The valve was trileaflet  Leaflets exhibited normal thickness and normal cuspal separation  DOPPLER: Transaortic velocity was within the normal range  There was no evidence for stenosis  There was trace regurgitation      TRICUSPID VALVE: The valve structure was normal  There was normal leaflet separation  DOPPLER: The transtricuspid velocity was within the normal range  There was no evidence for stenosis  There was trace regurgitation      PULMONIC VALVE: Not well visualized      PERICARDIUM: There was no pericardial effusion   The pericardium was normal in appearance      AORTA: The root exhibited normal size      SYSTEMIC VEINS: IVC: Respirophasic changes were blunted (less than 50% variation)      SYSTEM MEASUREMENT TABLES     2D  %FS: 41 63 %  Ao Diam: 2 64 cm  EDV(Teich): 133 38 ml  EF(Teich): 72 15 %  ESV(Teich): 37 15 ml  IVSd: 0 82 cm  LA Area: 15 35 cm2  LA Diam: 3 09 cm  LVEDV MOD A4C: 113 56 ml  LVEF MOD A4C: 57 1 %  LVESV MOD A4C: 48 72 ml  LVIDd: 5 27 cm  LVIDs: 3 07 cm  LVLd A4C: 8 13 cm  LVLs A4C: 6 92 cm  LVPWd: 0 75 cm  RA Area: 12 04 cm2  RVIDd: 3 01 cm  RWT: 0 28  SV MOD A4C: 64 84 ml  SV(Teich): 96 23 ml     CW  AR Dec [place]: 1 25 m/s2  AR Dec Time: 3251 ms  AR PHT: 942 79 ms  AR Vmax: 4 05 m/s  AR maxP 67 mmHg  AV Vmax: 1 43 m/s  AV maxP 17 mmHg  PV Vmax: 0 85 m/s  PV maxP 89 mmHg  TR Vmax: 2 16 m/s  TR maxP 63 mmHg     MM  TAPSE: 2 22 cm     PW  E' Lat: 0 21 m/s  E' Sept: 0 13 m/s  E/E' Lat: 4 77  E/E' Sept: 7 82  LVOT Vmax: 0 94 m/s  LVOT maxPG: 3 56 mmHg  MV A Rene: 0 67 m/s  MV Dec [place]: 6 56 m/s2  MV DecT: 153 08 ms  MV E [place]: 1 m/s  MV E/A Ratio: 1 49  RVOT Vmax: 0 69 m/s  RVOT maxP 92 mmHg     [facility] Accredited Echocardiography Laboratory     Prepared and electronically signed by  Zenon Martin DO  Signed 22-Mar-2021 16:13:08

## 2021-03-23 NOTE — CASE MANAGEMENT
Spoke with pt, she is being discharged to home today  She is to follow up with PCP, cardio, neuro  She is able to call to make appts herself  No discharge needs at this time

## 2021-03-23 NOTE — PLAN OF CARE
Problem: PAIN - ADULT  Goal: Verbalizes/displays adequate comfort level or baseline comfort level  Description: Interventions:  - Encourage patient to monitor pain and request assistance  - Assess pain using appropriate pain scale  - Administer analgesics based on type and severity of pain and evaluate response  - Implement non-pharmacological measures as appropriate and evaluate response  - Consider cultural and social influences on pain and pain management  - Notify physician/advanced practitioner if interventions unsuccessful or patient reports new pain  Outcome: Progressing     Problem: INFECTION - ADULT  Goal: Absence or prevention of progression during hospitalization  Description: INTERVENTIONS:  - Assess and monitor for signs and symptoms of infection  - Monitor lab/diagnostic results  - Monitor all insertion sites, i e  indwelling lines, tubes, and drains  - Administer medications as ordered  - Instruct and encourage patient and family to use good hand hygiene technique  - Identify and instruct in appropriate isolation precautions for identified infection/condition  Outcome: Progressing     Problem: SAFETY ADULT  Goal: Patient will remain free of falls  Description: INTERVENTIONS:  - Assess patient frequently for physical needs  -  Identify cognitive and physical deficits and behaviors that affect risk of falls    -  [place] fall precautions as indicated by assessment   - Educate patient/family on patient safety including physical limitations  - Instruct patient to call for assistance with activity based on assessment  - Modify environment to reduce risk of injury  - Consider OT/PT consult to assist with strengthening/mobility  Outcome: Progressing  Goal: Maintain or return to baseline ADL function  Description: INTERVENTIONS:  -  Assess patient's ability to carry out ADLs; assess patient's baseline for ADL function and identify physical deficits which impact ability to perform ADLs (bathing, care of mouth/teeth, toileting, grooming, dressing, etc )  - Assess/evaluate cause of self-care deficits   - Assess range of motion  - Assess patient's mobility; develop plan if impaired  - Assess patient's need for assistive devices and provide as appropriate  - Encourage maximum independence but intervene and supervise when necessary  - Involve family in performance of ADLs  - Assess for home care needs following discharge   - Consider OT consult to assist with ADL evaluation and planning for discharge  - Provide patient education as appropriate  Outcome: Progressing  Goal: Maintain or return mobility status to optimal level  Description: INTERVENTIONS:  - Assess patient's baseline mobility status (ambulation, transfers, stairs, etc )    - Identify cognitive and physical deficits and behaviors that affect mobility  - Identify mobility aids required to assist with transfers and/or ambulation (gait belt, sit-to-stand, lift, walker, cane, etc )  - [place] fall precautions as indicated by assessment  - Record patient progress and toleration of activity level on Mobility SBAR; progress patient to next Phase/Stage  - Instruct patient to call for assistance with activity based on assessment  - Consider rehabilitation consult to assist with strengthening/weightbearing, etc   Outcome: Progressing     Problem: DISCHARGE PLANNING  Goal: Discharge to home or other facility with appropriate resources  Description: INTERVENTIONS:  - Identify barriers to discharge w/patient and caregiver  - Arrange for needed discharge resources and transportation as appropriate  - Identify discharge learning needs (meds, wound care, etc )  - Refer to Case Management Department for coordinating discharge planning if the patient needs post-hospital services based on physician/advanced practitioner order or complex needs related to functional status, cognitive ability, or social support system  Outcome: Progressing     Problem: Knowledge Deficit  Goal: Patient/family/caregiver demonstrates understanding of disease process, treatment plan, medications, and discharge instructions  Description: Complete learning assessment and assess knowledge base    Interventions:  - Provide teaching at level of understanding  - Provide teaching via preferred learning methods  Outcome: Progressing     Problem: GASTROINTESTINAL - ADULT  Goal: Minimal or absence of nausea and/or vomiting  Description: INTERVENTIONS:  - Administer IV fluids if ordered to ensure adequate hydration  - Maintain NPO status until nausea and vomiting are resolved  - Nasogastric tube if ordered  - Administer ordered antiemetic medications as needed  - Provide nonpharmacologic comfort measures as appropriate  - Advance diet as tolerated, if ordered  - Consider nutrition services referral to assist patient with adequate nutrition and appropriate food choices  Outcome: Progressing  Goal: Maintains or returns to baseline bowel function  Description: INTERVENTIONS:  - Assess bowel function  - Encourage oral fluids to ensure adequate hydration  - Administer IV fluids if ordered to ensure adequate hydration  - Administer ordered medications as needed  - Encourage mobilization and activity  - Consider nutritional services referral to assist patient with adequate nutrition and appropriate food choices  Outcome: Progressing  Goal: Maintains adequate nutritional intake  Description: INTERVENTIONS:  - Monitor percentage of each meal consumed  - Identify factors contributing to decreased intake, treat as appropriate  - Assist with meals as needed  - Monitor I&O, weight, and lab values if indicated  - Obtain nutrition services referral as needed  Outcome: Progressing     Problem: METABOLIC, FLUID AND ELECTROLYTES - ADULT  Goal: Electrolytes maintained within normal limits  Description: INTERVENTIONS:  - Monitor labs and assess patient for signs and symptoms of electrolyte imbalances  - Administer electrolyte replacement as ordered  - Monitor response to electrolyte replacements, including repeat lab results as appropriate  - Instruct patient on fluid and nutrition as appropriate  Outcome: Progressing  Goal: Fluid balance maintained  Description: INTERVENTIONS:  - Monitor labs   - Monitor I/O and WT  - Instruct patient on fluid and nutrition as appropriate  - Assess for signs & symptoms of volume excess or deficit  Outcome: Progressing     Problem: Potential for Falls  Goal: Patient will remain free of falls  Description: INTERVENTIONS:  - Assess patient frequently for physical needs  -  Identify cognitive and physical deficits and behaviors that affect risk of falls    -  [place] fall precautions as indicated by assessment   - Educate patient/family on patient safety including physical limitations  - Instruct patient to call for assistance with activity based on assessment  - Modify environment to reduce risk of injury  - Consider OT/PT consult to assist with strengthening/mobility  Outcome: Progressing

## 2021-03-23 NOTE — ANESTHESIA POSTPROCEDURE EVALUATION
Post-Op Assessment Note    CV Status:  Stable  Pain Score: 0    Pain management: adequate     Mental Status:  Alert and awake   Hydration Status:  Euvolemic   PONV Controlled:  Controlled   Airway Patency:  Patent      Post Op Vitals Reviewed: Yes      Staff: CRNA         No complications documented      BP   84/45   Temp      Pulse 78   Resp   24   SpO2   95

## 2021-03-26 ENCOUNTER — TELEPHONE (OUTPATIENT)
Dept: GASTROENTEROLOGY | Facility: MEDICAL CENTER | Age: 31
End: 2021-03-26

## 2021-03-26 NOTE — RESULT ENCOUNTER NOTE
My medical assistant will call patient with results      Stomach biopsy shows inflammation and no infection  She should schedule office follow up at [facility] with a PA in 2-3 weeks

## 2021-03-26 NOTE — TELEPHONE ENCOUNTER
[employer]      Result Notes     Teresa Lopez, [address]   3/26/2021 11:15 AM EDT      Called patient, discussed results and recall        Mike Roberts MD   3/26/2021  9:05 AM EDT      My medical assistant will call patient with results      Stomach biopsy shows inflammation and no infection  She should schedule office follow up at [facility] with a PA in 2-3 weeks

## 2021-03-26 NOTE — TELEPHONE ENCOUNTER
Lucila Gallo first available appt with PA in the [facility] office I found is 6/15  I also placed her on the wait list   Please assist patient in scheduling sooner appt if available  Thank you!

## 2021-03-29 NOTE — PROGRESS NOTES
Cardiology Follow Up    Halima Hernandez  1990  [id number]  [address]  [address] [place]  [place] [address]  [phone]    1  Ventricular ectopy  POCT ECG    AMB extended holter monitor   2  Bradycardia     3  Tobacco use         Discussion/Summary:  Overall she has been feeling well from a cardiac standpoint  She had accelerated idioventricular rhythm in the hospital when she was hypokalemic from nausea and vomiting  Repeat labs yesterday show a potassium of 4 8  EKG today shows normal sinus rhythm without any ventricular ectopy  However she does have continued complaints of palpitations  I will have her wear a 1 week zio patch for completeness  Her chest discomfort is reproducible on exam and is relieved with Tylenol  No further evaluation is needed  She will be following with GI  Smoking cessation was discussed  If her zio patch is unremarkable, she can return on a prn basis  Interval History:   Halima Hernandez is a 31 y o  female with tobacco abuse  She was recently hospitalized with intractable nausea and vomiting  She was hypokalemic with a potassium of 2 8 upon admission  She had episodes of accelerated idioventricular rhythm noted on telemetry  Cardiology was consulted  An echocardiogram was performed which showed preserved systolic function without any wall motion or valvular abnormalities  EGD did not show any abnormalities but tissue sampling revealed chronic gastritis  h-pylori testing was negative  Since hospital discharge she has been feeling improved  She has had no recurrent nausea or vomiting  She has been tolerating a full diet  She has some residual chest discomfort which is reproducible on exam and is relieved with tylenol  She denies any exertional chest discomfort  She denies dyspnea on exertion, lower extremity edema, orthopnea, and PND   She denies lightheadedness, dizziness, syncope  She has been feeling her heart "race" a few times per week  She is down to smoking 1/2 pack of cigarettes daily      Problem List     Closed displaced fracture of acromial end of right clavicle with routine healing    Back pain    Tobacco use    Hypokalemia    Leukocytosis    Ventricular ectopy    Chest pain    Bradycardia    Intractable nausea and vomiting    Intractable abdominal pain    Liver lesion        Past Medical History:   Diagnosis Date    Asthma      Social History     Socioeconomic History    Marital status: Significant Other     Spouse name: Not on file    Number of children: Not on file    Years of education: Not on file    Highest education level: Not on file   Occupational History    Not on file   Social Needs    Financial resource strain: Not on file    Food insecurity     Worry: Not on file     Inability: Not on file    Transportation needs     Medical: Not on file     Non-medical: Not on file   Tobacco Use    Smoking status: Current Every Day Smoker     Packs/day: 1 00    Smokeless tobacco: Never Used   Substance and Sexual Activity    Alcohol use: Not Currently     Frequency: Never     Drinks per session: Patient refused     Binge frequency: Never     Comment: socailly    Drug use: Yes     Types: Marijuana    Sexual activity: Not on file   Lifestyle    Physical activity     Days per week: Not on file     Minutes per session: Not on file    Stress: Not on file   Relationships    Social connections     Talks on phone: Not on file     Gets together: Not on file     Attends Rastafarian service: Not on file     Active member of club or organization: Not on file     Attends meetings of clubs or organizations: Not on file     Relationship status: Not on file    Intimate partner violence     Fear of current or ex partner: Not on file     Emotionally abused: Not on file     Physically abused: Not on file     Forced sexual activity: Not on file   Other Topics Concern  Not on file   Social History Narrative    Not on file      History reviewed  No pertinent family history  Past Surgical History:   Procedure Laterality Date    CYST REMOVAL      SHOULDER SURGERY      R shoulder    TONSILLECTOMY         Current Outpatient Medications:     acetaminophen (TYLENOL) 325 mg tablet, Take 2 tablets (650 mg total) by mouth every 6 (six) hours as needed for mild pain, headaches or fever, Disp:  , Rfl: 0  No Known Allergies    Labs:     Chemistry        Component Value Date/Time    K 4 0 03/23/2021 0459     03/23/2021 0459    CO2 25 03/23/2021 0459    BUN 7 03/23/2021 0459    CREATININE 0 71 03/23/2021 0459        Component Value Date/Time    CALCIUM 9 0 03/23/2021 0459    ALKPHOS 50 03/22/2021 0523    AST 18 03/22/2021 0523    ALT 27 03/22/2021 0523            No results found for: CHOL  No results found for: HDL  No results found for: LDLCALC  No results found for: TRIG  No results found for: CHOLHDL    Imaging: Us Right Upper Quadrant    Result Date: 3/21/2021  Narrative: RIGHT UPPER QUADRANT ULTRASOUND INDICATION:     abd pain, n/v  COMPARISON:  3/20/2021 TECHNIQUE:   Real-time ultrasound of the right upper quadrant was performed with a curvilinear transducer with both volumetric sweeps and still imaging techniques  FINDINGS: PANCREAS:  Visualized portions of the pancreas are within normal limits  AORTA AND IVC:  Visualized portions are normal for patient age  LIVER: Size:  Moderately enlarged though likely related to Riedel's lobe compared with prior CT  The liver measures 19 4 cm in the midclavicular line  Contour:  Surface contour is smooth  Parenchyma:  Echogenicity and echotexture are within normal limits  No evidence of suspicious mass  Limited imaging of the main portal vein shows it to be patent and hepatopetal   BILIARY: No gallbladder findings  No intrahepatic biliary dilatation  CBD measures 3 mm  No choledocholithiasis   KIDNEY: Right kidney measures 10 3 x 3 9 x 5 2 cm  Within normal limits  ASCITES:   None  Impression: No acute abnormality  Workstation performed: [id number]     Ct Abdomen Pelvis With Contrast    Result Date: 3/20/2021  Narrative: CT ABDOMEN AND PELVIS WITH IV CONTRAST INDICATION:   Nausea/vomiting;  Epigastric pain;  r/o obstruction  68-year-old female presenting for vomiting for past 2 days  Epigastric abdominal tightness  Sudden onset of abdominal pain and vomiting 2 days ago  COMPARISON:  None  TECHNIQUE:  CT examination of the abdomen and pelvis was performed  Axial, sagittal, and coronal 2D reformatted images were created from the source data and submitted for interpretation  Radiation dose length product (DLP) for this visit:  385 07 mGy-cm   This examination, like all CT scans performed in the [facility], was performed utilizing techniques to minimize radiation dose exposure, including the use of iterative  reconstruction and automated exposure control  IV Contrast:  100 mL of iohexol (OMNIPAQUE) Enteric Contrast:  Enteric contrast was not administered  FINDINGS: ABDOMEN LOWER CHEST:  No clinically significant abnormality identified in the visualized lower chest  LIVER/BILIARY TREE:  One or more subcentimeter sharply circumscribed low-density hepatic lesion(s) are noted, too small to accurately characterize, but statistically most likely to represent subcentimeter hepatic cysts  No suspicious solid hepatic lesion is identified  Hepatic contours are normal   No biliary dilatation  GALLBLADDER:  No calcified gallstones  No pericholecystic inflammatory change  SPLEEN:  Unremarkable  PANCREAS:  Unremarkable  ADRENAL GLANDS:  Unremarkable  KIDNEYS/URETERS:  Unremarkable  No hydronephrosis  STOMACH AND BOWEL:  Evaluation of the GI tract is limited due to lack of oral contrast material  Stomach is distended and filled with ingested food products and air  Hiatal hernia  No evidence of small bowel obstruction   The colon is relatively decompressed, limiting evaluation  Hyperdense material in the cecum, likely ingested medication  APPENDIX: Appendix segmentally visualized  No findings to suggest appendicitis  ABDOMINOPELVIC CAVITY:  There is a small volume of free pelvic fluid, likely physiologic given the patient's age and gender  No pneumoperitoneum  No lymphadenopathy  VESSELS:  Unremarkable for patient's age  PELVIS REPRODUCTIVE ORGANS:  Unremarkable for patient's age  URINARY BLADDER:  Unremarkable  ABDOMINAL WALL/INGUINAL REGIONS:  Unremarkable  OSSEOUS STRUCTURES:  No acute fracture or destructive osseous lesion  Spinal degenerative changes are noted  Impression: Incidental findings as described above  No acute CT abnormality to account for the patient's abdominal symptoms  If clinically warranted, follow-up GI and/or general surgery consultation is recommended  Workstation performed: [id number]     Us Bedside Procedure    Result Date: 3/20/2021  Narrative: 1 2 [id number] 2 224 [id number]      ECG: normal sinus rhythm      Review of Systems   Constitution: Negative for chills and fever  HENT: Negative  Cardiovascular: Positive for chest pain and palpitations  Negative for dyspnea on exertion, leg swelling, near-syncope, orthopnea, paroxysmal nocturnal dyspnea and syncope  Respiratory: Negative for cough and shortness of breath  Gastrointestinal: Negative for diarrhea, nausea and vomiting  Genitourinary: Negative  Neurological: Negative for dizziness and light-headedness  All other systems reviewed and are negative  Vitals:    03/30/21 1458   BP: 104/72   Pulse: 83     Vitals:    03/30/21 1458   Weight: 72 6 kg (160 lb)     Height: 5' 5" (165 1 cm)   Body mass index is 26 63 kg/m²  Physical Exam:  Physical Exam  Vitals signs reviewed  Constitutional:       General: She is not in acute distress  Appearance: She is not diaphoretic     HENT:      Head: Normocephalic and atraumatic  Eyes:      Pupils: Pupils are equal, round, and reactive to light  Neck:      Musculoskeletal: Normal range of motion  Vascular: No carotid bruit  Cardiovascular:      Rate and Rhythm: Normal rate and regular rhythm  Pulses:           Radial pulses are 2+ on the right side and 2+ on the left side  Dorsalis pedis pulses are 2+ on the right side and 2+ on the left side  Heart sounds: S1 normal and S2 normal  No murmur  Pulmonary:      Effort: Pulmonary effort is normal  No respiratory distress  Breath sounds: Normal breath sounds  No wheezing or rales  Abdominal:      General: There is no distension  Palpations: Abdomen is soft  Tenderness: There is no abdominal tenderness  Musculoskeletal: Normal range of motion  General: No deformity  Right lower leg: No edema  Left lower leg: No edema  Skin:     General: Skin is warm and dry  Findings: No erythema  Neurological:      General: No focal deficit present  Mental Status: She is alert and oriented to person, place, and time     Psychiatric:         Mood and Affect: Mood normal          Behavior: Behavior normal

## 2021-03-30 ENCOUNTER — OFFICE VISIT (OUTPATIENT)
Dept: CARDIOLOGY CLINIC | Facility: HOSPITAL | Age: 31
End: 2021-03-30
Payer: COMMERCIAL

## 2021-03-30 VITALS
DIASTOLIC BLOOD PRESSURE: 72 MMHG | HEART RATE: 83 BPM | HEIGHT: 65 IN | SYSTOLIC BLOOD PRESSURE: 104 MMHG | WEIGHT: 160 LBS | BODY MASS INDEX: 26.66 KG/M2

## 2021-03-30 DIAGNOSIS — I49.3 VENTRICULAR ECTOPY: Primary | ICD-10-CM

## 2021-03-30 DIAGNOSIS — R00.1 BRADYCARDIA: ICD-10-CM

## 2021-03-30 DIAGNOSIS — Z72.0 TOBACCO USE: ICD-10-CM

## 2021-03-30 PROCEDURE — 99214 OFFICE O/P EST MOD 30 MIN: CPT | Performed by: PHYSICIAN ASSISTANT

## 2021-03-30 PROCEDURE — 93000 ELECTROCARDIOGRAM COMPLETE: CPT | Performed by: PHYSICIAN ASSISTANT

## 2021-04-26 ENCOUNTER — TELEPHONE (OUTPATIENT)
Dept: GASTROENTEROLOGY | Facility: CLINIC | Age: 31
End: 2021-04-26

## 2021-04-26 NOTE — TELEPHONE ENCOUNTER
Left message for patient to contact our office  [employer] is not in network with [facility]  Patient must either change insurances or find another GI specialist in network with insurance

## 2021-09-02 ENCOUNTER — HOSPITAL ENCOUNTER (EMERGENCY)
Facility: HOSPITAL | Age: 31
Discharge: HOME | End: 2021-09-02
Attending: EMERGENCY MEDICINE | Admitting: EMERGENCY MEDICINE
Payer: COMMERCIAL

## 2021-09-02 VITALS
OXYGEN SATURATION: 98 % | WEIGHT: 173.28 LBS | TEMPERATURE: 98.2 F | BODY MASS INDEX: 28.84 KG/M2 | RESPIRATION RATE: 16 BRPM | SYSTOLIC BLOOD PRESSURE: 121 MMHG | DIASTOLIC BLOOD PRESSURE: 58 MMHG | HEART RATE: 90 BPM

## 2021-09-02 DIAGNOSIS — Z20.822 ENCOUNTER FOR LABORATORY TESTING FOR COVID-19 VIRUS: Primary | ICD-10-CM

## 2021-09-02 LAB — SARS-COV-2 RNA RESP QL NAA+PROBE: NEGATIVE

## 2021-09-02 PROCEDURE — U0003 INFECTIOUS AGENT DETECTION BY NUCLEIC ACID (DNA OR RNA); SEVERE ACUTE RESPIRATORY SYNDROME CORONAVIRUS 2 (SARS-COV-2) (CORONAVIRUS DISEASE [COVID-19]), AMPLIFIED PROBE TECHNIQUE, MAKING USE OF HIGH THROUGHPUT TECHNOLOGIES AS DESCRIBED BY CMS-2020-01-R: HCPCS | Performed by: PHYSICIAN ASSISTANT

## 2021-09-02 PROCEDURE — U0005 INFEC AGEN DETEC AMPLI PROBE: HCPCS | Performed by: PHYSICIAN ASSISTANT

## 2021-09-02 PROCEDURE — 99282 EMERGENCY DEPT VISIT SF MDM: CPT

## 2021-09-02 PROCEDURE — 99284 EMERGENCY DEPT VISIT MOD MDM: CPT | Performed by: PHYSICIAN ASSISTANT

## 2021-09-02 NOTE — Clinical Note
Mary Major was seen and treated in our emergency department on 9/2/2021  Diagnosis:     Crystal       She may return on this date:     Patient must quarantine until informed of COVID-19 test results  If negative, patient must be without symptoms for 3 days before returning to work  If you have any questions or concerns, please don't hesitate to call        Lizy Ziegler PA-C    ______________________________           _______________          _______________  Hospital Representative                              Date                                Time

## 2021-09-02 NOTE — ED PROVIDER NOTES
HPI: Patient is a 31 y o  female who presents with no symptoms, requires screening  The patient has had contact with people with similar symptoms  The patient has not taken any medication  No Known Allergies    Past Medical History:   Diagnosis Date    Asthma       Past Surgical History:   Procedure Laterality Date    CYST REMOVAL      SHOULDER SURGERY      R shoulder    TONSILLECTOMY       Social History     Tobacco Use    Smoking status: Current Every Day Smoker     Packs/day: 1 00    Smokeless tobacco: Never Used   Vaping Use    Vaping Use: Never used   Substance Use Topics    Alcohol use: Not Currently     Comment: socailly    Drug use: Yes     Types: Marijuana       Nursing notes reviewed  Physical Exam:  ED Triage Vitals [09/02/21 1446]   Temperature Pulse Respirations Blood Pressure SpO2   98 2 °F (36 8 °C) 90 16 121/58 98 %      Temp Source Heart Rate Source Patient Position - Orthostatic VS BP Location FiO2 (%)   Oral Monitor Sitting Right arm --      Pain Score       No Pain           ROS: Positive for nothing  The remainder of a 10 organ system ROS was otherwise unremarkable  General: awake, alert, no acute distress    Head: normocephalic, atraumatic    Eyes: no scleral icterus  Ears: external ears normal, hearing grossly intact  Nose: external exam grossly normal, negative nasal discharge  Neck: symmetric, No JVD noted, trachea midline  Pulmonary: no respiratory distress, no tachypnea noted  Cardiovascular: appears well perfused  Abdomen: no distention noted  Musculoskeletal: no deformities noted, tone normal  Neuro: grossly non-focal  Psych: mood and affect appropriate    The patient is stable and has a history and physical exam consistent with a viral illness  COVID19 testing has been performed  I considered the patient's other medical conditions as applicable/noted above in my medical decision making  The patient is stable upon discharge  The plan is for supportive care at home      The patient (and any family present) verbalized understanding of the discharge instructions and warnings that would necessitate return to the Emergency Department  All questions were answered prior to discharge  Medications - No data to display  Final diagnoses:   Encounter for laboratory testing for COVID-19 virus     Time reflects when diagnosis was documented in both MDM as applicable and the Disposition within this note     Time User Action Codes Description Comment    9/2/2021  3:10 PM Samir Parks Add [A94 106] Encounter for laboratory testing for COVID-19 virus       ED Disposition     ED Disposition Condition Date/Time Comment    Discharge Stable Thu Sep 2, 2021  3:09 PM Britta Elías discharge to home/self care              Follow-up Information     Follow up With Specialties Details Why [address] [facility] Emergency Department Emergency Medicine  If symptoms worsen [facility] [id number]  [address] [facility] Emergency Department, [address] [place], [place], [address]        Discharge Medication List as of 9/2/2021  3:10 PM      CONTINUE these medications which have NOT CHANGED    Details   acetaminophen (TYLENOL) 325 mg tablet Take 2 tablets (650 mg total) by mouth every 6 (six) hours as needed for mild pain, headaches or fever, Starting Tue 3/23/2021, No Print      albuterol (Ventolin HFA) 90 mcg/act inhaler Inhale 2 puffs, Starting Mon 3/29/2021, Historical Med      Arthritis Pain Relief 650 MG CR tablet TAKE 1 TABLET BY MOUTH EVERY 8 HOURS AS NEEDED FOR MODERATE PAIN, Historical Med      HYDROcodone-acetaminophen (NORCO) 5-325 mg per tablet Hydrocodone-Acetaminophen 5-325 MG Oral Tablet   Refills: 0    Active, Historical Med      naproxen (NAPROSYN) 500 mg tablet Naproxen 500 MG Oral Tablet   Refills: 0    Active, Historical Med      oxyCODONE-acetaminophen (Percocet) 5-325 mg per tablet Take by mouth, Historical Med      pantoprazole (PROTONIX) 40 mg tablet Pantoprazole Sodium 40 MG Oral Tablet Delayed Release   Refills: 0    Active, Historical Med      Sennosides (Senna) 8 6 MG CAPS Take 8 6 mg by mouth, Starting Mon 3/29/2021, Historical Med           No discharge procedures on file      Electronically Signed by       Cristiano Mendoza PA-C  09/02/21 1384

## 2021-12-05 ENCOUNTER — NURSE TRIAGE (OUTPATIENT)
Dept: OTHER | Facility: OTHER | Age: 31
End: 2021-12-05

## 2021-12-05 DIAGNOSIS — Z20.828 EXPOSURE TO SARS VIRUS: Primary | ICD-10-CM

## 2021-12-05 PROCEDURE — U0003 INFECTIOUS AGENT DETECTION BY NUCLEIC ACID (DNA OR RNA); SEVERE ACUTE RESPIRATORY SYNDROME CORONAVIRUS 2 (SARS-COV-2) (CORONAVIRUS DISEASE [COVID-19]), AMPLIFIED PROBE TECHNIQUE, MAKING USE OF HIGH THROUGHPUT TECHNOLOGIES AS DESCRIBED BY CMS-2020-01-R: HCPCS | Performed by: FAMILY MEDICINE

## 2021-12-05 PROCEDURE — U0005 INFEC AGEN DETEC AMPLI PROBE: HCPCS | Performed by: FAMILY MEDICINE

## 2022-01-03 ENCOUNTER — HOSPITAL ENCOUNTER (EMERGENCY)
Facility: HOSPITAL | Age: 32
Discharge: HOME | End: 2022-01-04
Attending: EMERGENCY MEDICINE
Payer: COMMERCIAL

## 2022-01-03 ENCOUNTER — APPOINTMENT (EMERGENCY)
Dept: CT IMAGING | Facility: HOSPITAL | Age: 32
End: 2022-01-03
Payer: COMMERCIAL

## 2022-01-03 VITALS
HEIGHT: 65 IN | SYSTOLIC BLOOD PRESSURE: 125 MMHG | HEART RATE: 103 BPM | TEMPERATURE: 100.8 F | DIASTOLIC BLOOD PRESSURE: 61 MMHG | OXYGEN SATURATION: 99 % | BODY MASS INDEX: 28.99 KG/M2 | RESPIRATION RATE: 18 BRPM | WEIGHT: 174 LBS

## 2022-01-03 DIAGNOSIS — U07.1 COVID-19: Primary | ICD-10-CM

## 2022-01-03 DIAGNOSIS — Z76.5 DRUG-SEEKING BEHAVIOR: ICD-10-CM

## 2022-01-03 LAB
BILIRUB UR QL STRIP: NEGATIVE
CLARITY UR: CLEAR
COLOR UR: YELLOW
EXT PREG TEST URINE: NEGATIVE
EXT. CONTROL ED NAV: NORMAL
GLUCOSE UR STRIP-MCNC: NEGATIVE MG/DL
HGB UR QL STRIP.AUTO: NEGATIVE
KETONES UR STRIP-MCNC: ABNORMAL MG/DL
LEUKOCYTE ESTERASE UR QL STRIP: NEGATIVE
NITRITE UR QL STRIP: NEGATIVE
PH UR STRIP.AUTO: 6.5 [PH]
PROT UR STRIP-MCNC: NEGATIVE MG/DL
SP GR UR STRIP.AUTO: 1.01 (ref 1–1.03)
UROBILINOGEN UR QL STRIP.AUTO: 0.2 E.U./DL

## 2022-01-03 PROCEDURE — 72131 CT LUMBAR SPINE W/O DYE: CPT

## 2022-01-03 PROCEDURE — 96372 THER/PROPH/DIAG INJ SC/IM: CPT

## 2022-01-03 PROCEDURE — 0241U HB NFCT DS VIR RESP RNA 4 TRGT: CPT | Performed by: EMERGENCY MEDICINE

## 2022-01-03 PROCEDURE — 99285 EMERGENCY DEPT VISIT HI MDM: CPT | Performed by: EMERGENCY MEDICINE

## 2022-01-03 PROCEDURE — G1004 CDSM NDSC: HCPCS

## 2022-01-03 PROCEDURE — 99284 EMERGENCY DEPT VISIT MOD MDM: CPT

## 2022-01-03 PROCEDURE — 81003 URINALYSIS AUTO W/O SCOPE: CPT | Performed by: EMERGENCY MEDICINE

## 2022-01-03 PROCEDURE — 81025 URINE PREGNANCY TEST: CPT | Performed by: EMERGENCY MEDICINE

## 2022-01-03 RX ORDER — DIAZEPAM 5 MG/ML
5 INJECTION, SOLUTION INTRAMUSCULAR; INTRAVENOUS ONCE
Status: COMPLETED | OUTPATIENT
Start: 2022-01-03 | End: 2022-01-03

## 2022-01-03 RX ORDER — METHYLPREDNISOLONE ACETATE 80 MG/ML
60 INJECTION, SUSPENSION INTRA-ARTICULAR; INTRALESIONAL; INTRAMUSCULAR; SOFT TISSUE ONCE
Status: COMPLETED | OUTPATIENT
Start: 2022-01-03 | End: 2022-01-03

## 2022-01-03 RX ORDER — KETOROLAC TROMETHAMINE 30 MG/ML
30 INJECTION, SOLUTION INTRAMUSCULAR; INTRAVENOUS ONCE
Status: COMPLETED | OUTPATIENT
Start: 2022-01-03 | End: 2022-01-03

## 2022-01-03 RX ADMIN — METHYLPREDNISOLONE ACETATE 60 MG: 80 INJECTION, SUSPENSION INTRA-ARTICULAR; INTRALESIONAL; INTRAMUSCULAR; SOFT TISSUE at 23:12

## 2022-01-03 RX ADMIN — DIAZEPAM 5 MG: 5 INJECTION, SOLUTION INTRAMUSCULAR; INTRAVENOUS at 23:11

## 2022-01-03 RX ADMIN — KETOROLAC TROMETHAMINE 30 MG: 30 INJECTION, SOLUTION INTRAMUSCULAR; INTRAVENOUS at 23:09

## 2022-01-04 PROBLEM — U07.1 COVID-19: Status: ACTIVE | Noted: 2022-01-04

## 2022-01-04 PROBLEM — Z76.5 DRUG-SEEKING BEHAVIOR: Status: ACTIVE | Noted: 2022-01-04

## 2022-01-04 LAB
FLUAV RNA RESP QL NAA+PROBE: NEGATIVE
FLUBV RNA RESP QL NAA+PROBE: NEGATIVE
RSV RNA RESP QL NAA+PROBE: NEGATIVE
SARS-COV-2 RNA RESP QL NAA+PROBE: POSITIVE

## 2022-01-04 RX ORDER — B-COMPLEX WITH VITAMIN C
4 TABLET ORAL
Qty: 40 TABLET | Refills: 0 | Status: SHIPPED | OUTPATIENT
Start: 2022-01-04 | End: 2022-01-14

## 2022-01-04 RX ORDER — MULTIVITAMIN
1 CAPSULE ORAL DAILY
Qty: 10 CAPSULE | Refills: 0 | Status: SHIPPED | OUTPATIENT
Start: 2022-01-04 | End: 2022-01-14

## 2022-01-04 RX ORDER — IBUPROFEN 600 MG/1
600 TABLET ORAL EVERY 6 HOURS PRN
Qty: 60 TABLET | Refills: 0 | Status: SHIPPED | OUTPATIENT
Start: 2022-01-04

## 2022-01-04 RX ORDER — ALBUTEROL SULFATE 90 UG/1
2 AEROSOL, METERED RESPIRATORY (INHALATION) EVERY 4 HOURS PRN
Qty: 18 G | Refills: 0 | Status: SHIPPED | OUTPATIENT
Start: 2022-01-04

## 2022-01-04 RX ORDER — ONDANSETRON 4 MG/1
4 TABLET, FILM COATED ORAL EVERY 6 HOURS
Qty: 20 TABLET | Refills: 0 | Status: SHIPPED | OUTPATIENT
Start: 2022-01-04

## 2022-01-04 NOTE — ED PROVIDER NOTES
History  Chief Complaint   Patient presents with    Back Pain     pt states that pain is in lower back spreading down to her hips, denies any injury, has this type of back pain before     This is a 36year old female with a PMH of lower back pain with intermittent radiation of pain to the legs and numbness that presented to the ED this afternoon reporting that she has had an increase of her lower back pain and leg pain and numbess - bilateral - reports that she has had similar presentation in the past and has been seen in the ED - has never been followed by spine or ortho    Denies recent trauma or injury    Has low grade fever - reports that the fever "is due to her pain" - has not been vaccinated against COVID - reports that she took a home COVID test 2 days ago and it was negative - reports having symptoms when she took the test    Denies bowel or bladder incontinence or retention              Prior to Admission Medications   Prescriptions Last Dose Informant Patient Reported? Taking?    Arthritis Pain Relief 650 MG CR tablet   Yes No   Sig: TAKE 1 TABLET BY MOUTH EVERY 8 HOURS AS NEEDED FOR MODERATE PAIN   HYDROcodone-acetaminophen (NORCO) 5-325 mg per tablet   Yes No   Sig: Hydrocodone-Acetaminophen 5-325 MG Oral Tablet   Refills: 0    Active   Sennosides (Senna) 8 6 MG CAPS   Yes No   Sig: Take 8 6 mg by mouth   acetaminophen (TYLENOL) 325 mg tablet   No No   Sig: Take 2 tablets (650 mg total) by mouth every 6 (six) hours as needed for mild pain, headaches or fever   oxyCODONE-acetaminophen (Percocet) 5-325 mg per tablet   Yes No   Sig: Take by mouth   pantoprazole (PROTONIX) 40 mg tablet   Yes No   Sig: Pantoprazole Sodium 40 MG Oral Tablet Delayed Release   Refills: 0    Active      Facility-Administered Medications: None       Past Medical History:   Diagnosis Date    Asthma        Past Surgical History:   Procedure Laterality Date    CYST REMOVAL      SHOULDER SURGERY      R shoulder    TONSILLECTOMY History reviewed  No pertinent family history  I have reviewed and agree with the history as documented  E-Cigarette/Vaping    E-Cigarette Use Never User      E-Cigarette/Vaping Substances    Nicotine No     THC No     CBD No     Flavoring No     Other No     Unknown No      Social History     Tobacco Use    Smoking status: Current Every Day Smoker     Packs/day: 1 00    Smokeless tobacco: Never Used   Vaping Use    Vaping Use: Never used   Substance Use Topics    Alcohol use: Not Currently     Comment: socailly    Drug use: Yes     Types: Marijuana       Review of Systems   Constitutional: Positive for fever  Negative for activity change, appetite change, chills, diaphoresis, fatigue and unexpected weight change  HENT: Negative for congestion, ear discharge, ear pain, mouth sores, sinus pressure, sinus pain, sneezing, sore throat, trouble swallowing and voice change  Eyes: Negative for photophobia, pain, discharge, redness, itching and visual disturbance  Respiratory: Negative for cough, chest tightness and shortness of breath  Cardiovascular: Negative for chest pain, palpitations and leg swelling  Gastrointestinal: Negative for abdominal pain, constipation, nausea and vomiting  Endocrine: Negative for cold intolerance, heat intolerance, polydipsia, polyphagia and polyuria  Genitourinary: Negative for decreased urine volume, difficulty urinating, dysuria, frequency, hematuria and urgency  Musculoskeletal: Positive for back pain  Negative for arthralgias, gait problem, joint swelling, myalgias, neck pain and neck stiffness  See HPI   Skin: Negative for color change and rash  Allergic/Immunologic: Negative for immunocompromised state  Neurological: Negative for dizziness, tremors, seizures, syncope, speech difficulty, weakness, light-headedness, numbness and headaches  Hematological: Does not bruise/bleed easily     Psychiatric/Behavioral: Negative for behavioral problems and suicidal ideas  Physical Exam  Physical Exam  Vitals and nursing note reviewed  Constitutional:       General: She is not in acute distress  Appearance: Normal appearance  She is well-developed and normal weight  She is not ill-appearing, toxic-appearing or diaphoretic  HENT:      Head: Normocephalic and atraumatic  Right Ear: Tympanic membrane, ear canal and external ear normal  There is no impacted cerumen  Left Ear: Tympanic membrane, ear canal and external ear normal  There is no impacted cerumen  Nose: Nose normal  No congestion or rhinorrhea  Mouth/Throat:      Mouth: Mucous membranes are moist       Pharynx: Oropharynx is clear  No oropharyngeal exudate or posterior oropharyngeal erythema  Eyes:      General: No scleral icterus  Right eye: No discharge  Left eye: No discharge  Extraocular Movements: Extraocular movements intact  Conjunctiva/sclera: Conjunctivae normal       Pupils: Pupils are equal, round, and reactive to light  Neck:      Thyroid: No thyromegaly  Vascular: No hepatojugular reflux or JVD  Trachea: No tracheal deviation  Cardiovascular:      Rate and Rhythm: Normal rate and regular rhythm  Pulses: Normal pulses  Carotid pulses are 2+ on the right side and 2+ on the left side  Radial pulses are 2+ on the right side and 2+ on the left side  Dorsalis pedis pulses are 2+ on the right side and 2+ on the left side  Posterior tibial pulses are 2+ on the right side and 2+ on the left side  Heart sounds: Normal heart sounds  No murmur heard  Pulmonary:      Effort: Pulmonary effort is normal  No accessory muscle usage or respiratory distress  Breath sounds: Normal breath sounds  No wheezing or rales  Chest:      Chest wall: No mass, deformity, tenderness, crepitus or edema     Abdominal:      General: Bowel sounds are normal  There is no distension or abdominal bruit  Palpations: Abdomen is soft  There is no hepatomegaly, splenomegaly or mass  Tenderness: There is no abdominal tenderness  There is no right CVA tenderness, left CVA tenderness, guarding or rebound  Hernia: No hernia is present  Musculoskeletal:         General: No tenderness  Normal range of motion  Cervical back: Normal range of motion and neck supple  No rigidity  No muscular tenderness  Right lower leg: No tenderness  No edema  Left lower leg: No tenderness  No edema  Comments: Generalized tenderness lower lumbar spine - no step off   Lymphadenopathy:      Cervical: No cervical adenopathy  Skin:     General: Skin is warm and dry  Capillary Refill: Capillary refill takes less than 2 seconds  Findings: No ecchymosis or rash  Neurological:      General: No focal deficit present  Mental Status: She is alert and oriented to person, place, and time  Cranial Nerves: No cranial nerve deficit  Sensory: No sensory deficit  Motor: No weakness or abnormal muscle tone  Deep Tendon Reflexes: Reflexes normal    Psychiatric:         Mood and Affect: Mood normal          Behavior: Behavior normal          Thought Content:  Thought content normal          Judgment: Judgment normal          Vital Signs  ED Triage Vitals [01/03/22 2110]   Temperature Pulse Respirations Blood Pressure SpO2   (!) 100 8 °F (38 2 °C) 103 18 125/61 99 %      Temp Source Heart Rate Source Patient Position - Orthostatic VS BP Location FiO2 (%)   Temporal Monitor Sitting Left arm --      Pain Score       10 - Worst Possible Pain           Vitals:    01/03/22 2110   BP: 125/61   Pulse: 103   Patient Position - Orthostatic VS: Sitting         Visual Acuity      ED Medications  Medications   ketorolac (TORADOL) injection 30 mg (30 mg Intramuscular Given 1/3/22 2303)   methylPREDNISolone acetate (DEPO-MEDROL) injection 60 mg (60 mg Intramuscular Given 1/3/22 2312)   diazepam (VALIUM) injection 5 mg (5 mg Intramuscular Given 1/3/22 3702)       Diagnostic Studies  Results Reviewed     Procedure Component Value Units Date/Time    COVID/FLU/RSV - 2 hour TAT [[id number]]  (Abnormal) Collected: 01/03/22 2820    Lab Status: Final result Specimen: Nasopharyngeal Swab Updated: 01/04/22 0000     SARS-CoV-2 Positive     INFLUENZA A PCR Negative     INFLUENZA B PCR Negative     RSV PCR Negative    Narrative:      FOR PEDIATRIC PATIENTS - copy/paste COVID Guidelines URL to browser: [email or web address]  [employer]    SARS-CoV-2 assay is a Nucleic Acid Amplification assay intended for the  qualitative detection of nucleic acid from SARS-CoV-2 in nasopharyngeal  swabs  Results are for the presumptive identification of SARS-CoV-2 RNA  Positive results are indicative of infection with SARS-CoV-2, the virus  causing COVID-19, but do not rule out bacterial infection or co-infection  with other viruses  Laboratories within the [place] and its  territories are required to report all positive results to the appropriate  public health authorities  Negative results do not preclude SARS-CoV-2  infection and should not be used as the sole basis for treatment or other  patient management decisions  Negative results must be combined with  clinical observations, patient history, and epidemiological information  This test has not been FDA cleared or approved  This test has been authorized by FDA under an Emergency Use Authorization  (EUA)  This test is only authorized for the duration of time the  declaration that circumstances exist justifying the authorization of the  emergency use of an in vitro diagnostic tests for detection of SARS-CoV-2  virus and/or diagnosis of COVID-19 infection under section 564(b)(1) of  the Act, 21 U  [place] C  879XPU-0(G)(5), unless the authorization is terminated  or revoked sooner   The test has been validated but independent review by FDA  and CLIA is pending  Test performed using [employer] Gene[employer]pert: This RT-PCR assay targets N2,  a region unique to SARS-CoV-2  A conserved region in the E-gene was chosen  for pan-Sarbecovirus detection which includes SARS-CoV-2  UA w Reflex to Microscopic w Reflex to Culture [[id number]]  (Abnormal) Collected: 01/03/22 2244    Lab Status: Final result Specimen: Urine, Clean Catch Updated: 01/03/22 2254     Color, UA Yellow     Clarity, UA Clear     Specific Gravity, UA 1 015     pH, UA 6 5     Leukocytes, UA Negative     Nitrite, UA Negative     Protein, UA Negative mg/dl      Glucose, UA Negative mg/dl      Ketones, UA Trace mg/dl      Urobilinogen, UA 0 2 E U /dl      Bilirubin, UA Negative     Blood, UA Negative    POCT pregnancy, urine [[id number]]  (Normal) Resulted: 01/03/22 2245    Lab Status: Final result Updated: 01/03/22 2245     EXT PREG TEST UR (Ref: Negative) negative     Control valid                 CT spine lumbar without contrast   Final Result by Nitin Gaffney MD (01/04 8222)      Mild disc and facet degenerative change in the lower lumbar spine, most prominent at L5-S1, not significantly changed since the prior MRI  Workstation performed: [id number]                    Procedures  Procedures         ED Course  ED Course as of 01/06/22 2127   Mon Jan 03, 2022   2330 UA negative for UTI - pregnancy negative   Tue Jan 04, 2022   0013 I advised pt that she had COVID - she told me that I was lying - she said that she has a pre-existing back problem and that she does not have COVID - did instruct her how to add the [employer] joy and she will then be able to pull up hr results  She replied that she needed "fucking pain medications - not medicaions for COVID "  Pt was in the [place]axter while all this was going on next ro a minor and there were no beds available in the ED - she also would not keep her mask on and had to be asked numerous times to placed her mask on    After I discussed her dx and treatment with her she made a call on her cell phone and was yelling at the other party about the employees here that treated her  She stated that the only reason that she had COVID was that she caught it in thewaiting room  She was escorted out by security - she was able to get off of the stretcher easily and ambulated out of the ED   0124 CT of the lumbar spine interpreted by the radiologist as follows:   IMPRESSION:     Mild disc and facet degenerative change in the lower lumbar spine, most prominent at L5-S1, not significantly changed since the prior MRI    0124 Urinalysis showed no signs of UTI did shows trace ketones  Pregnancy test negative  COVID positive flu negative RSV negative  Patient given instructions verbally and in writing for COVID virus  Medications sent to the pharmacy for patient  The patient was provided with a hospital off the document regarding patient and visitor code of contact   Thu Jan 06, 2022 2126   This was a difficult patient encounter  The patient's expectations regarding management were not able to be met given the guidelines from the hospital and the patient's personal care guidelines  I discussed at length with the patient my concerns and offered treatment options that were in keeping with our policies and procedures  Unfortunately this did not alleviate the patient's concerns  At all times, myself and the staff were respectful of the patient, attempted to solve their issues within our constraints and treated the patient with all due courtesy  Unfortunately the patient left disappointed with the care he/she received  I encouraged him/her to follow up with his/her primary care provider for further treatment and to return to the ED if they had any other symptoms or concerns                                                      MDM  Number of Diagnoses or Management Options  COVID-19: new and requires workup  Drug-seeking behavior: minor  Diagnosis management comments: This is a 36year old female with a past hx of lower lumbar disease that presents to the ED today reporting generalized lower lumbar pain - denies new trauma or injury    fEVER ON ARRIVAL  8 EVEN THOUGH PT DENIES FEVER    Has not been vaccinated against the COVID virus    Pt repeatedly requested "pain medication "       Concerned for increases in lower lumbar abnormalities, COVID flu, drug seeking behavior, viral syndrome and other concerns       Amount and/or Complexity of Data Reviewed  Clinical lab tests: ordered and reviewed  Tests in the radiology section of CPT®: ordered and reviewed  Review and summarize past medical records: yes    Risk of Complications, Morbidity, and/or Mortality  Presenting problems: high  Diagnostic procedures: moderate  Management options: moderate    Patient Progress  Patient progress: stable      Disposition  Final diagnoses:   COVID-19   Drug-seeking behavior     Time reflects when diagnosis was documented in both MDM as applicable and the Disposition within this note     Time User Action Codes Description Comment    1/4/2022 12:08 AM Cam Beets Add [U07 1] COVID-19     1/4/2022 12:15 AM Cam Beets Add [Z76 5] Drug-seeking behavior       ED Disposition     ED Disposition Condition Date/Time Comment    Discharge Stable Tue Jan 4, 2022 12:08 AM Bozena Frye discharge to home/self care              Follow-up Information     Follow up With Specialties Details Why [address] [facility], DO Internal Medicine Schedule an appointment as soon as possible for a visit in 3 days  [address] [place] [address]  [phone]            Discharge Medication List as of 1/4/2022 12:09 AM      START taking these medications    Details   albuterol (ProAir HFA) 90 mcg/act inhaler Inhale 2 puffs every 4 (four) hours as needed for wheezing or shortness of breath (2 puffs eric 4-6 hours around the clock for the next 72 hours and then every 4 hours as needed), Starting Tue 1/4/2022, Normal      ascorbic acid (VITAMIN C) 1000 MG tablet Take 1 tablet (1,000 mg total) by mouth 2 (two) times a day for 10 days, Starting Tue 1/4/2022, Until Fri 1/14/2022, Normal      calcium carbonate-vitamin D (OSCAL-D) 500 mg-200 units per tablet Take 4 tablets by mouth daily with breakfast for 10 days, Starting Tue 1/4/2022, Until Fri 1/14/2022, Normal      ibuprofen (MOTRIN) 600 mg tablet Take 1 tablet (600 mg total) by mouth every 6 (six) hours as needed for mild pain, moderate pain, fever or headaches, Starting Tue 1/4/2022, Normal      Multiple Vitamin (multivitamin) capsule Take 1 capsule by mouth daily for 10 days, Starting Tue 1/4/2022, Until Fri 1/14/2022, Normal      ondansetron (ZOFRAN) 4 mg tablet Take 1 tablet (4 mg total) by mouth every 6 (six) hours, Starting Tue 1/4/2022, Normal         CONTINUE these medications which have NOT CHANGED    Details   acetaminophen (TYLENOL) 325 mg tablet Take 2 tablets (650 mg total) by mouth every 6 (six) hours as needed for mild pain, headaches or fever, Starting Tue 3/23/2021, No Print      Arthritis Pain Relief 650 MG CR tablet TAKE 1 TABLET BY MOUTH EVERY 8 HOURS AS NEEDED FOR MODERATE PAIN, Historical Med      HYDROcodone-acetaminophen (NORCO) 5-325 mg per tablet Hydrocodone-Acetaminophen 5-325 MG Oral Tablet   Refills: 0    Active, Historical Med      oxyCODONE-acetaminophen (Percocet) 5-325 mg per tablet Take by mouth, Historical Med      pantoprazole (PROTONIX) 40 mg tablet Pantoprazole Sodium 40 MG Oral Tablet Delayed Release   Refills: 0    Active, Historical Med      Sennosides (Senna) 8 6 MG CAPS Take 8 6 mg by mouth, Starting Mon 3/29/2021, Historical Med             No discharge procedures on file      PDMP Review     None          ED Provider  Electronically Signed by           Bradley Ortez MD  01/06/22 6703

## 2022-01-04 NOTE — DISCHARGE INSTRUCTIONS

## 2022-01-04 NOTE — ED NOTES
Patient continues to talk on the phone while in the hallway after provider reviewed discharge papers with her, using inappropriate language  States to person on phone "I only got covid lance they made me wait in the waiting room for 3 hours"  Asked that patient please put mask on while in the hospital as that is our policy and she does have covid  Patient stating we are "making a show" and its "not that serious"  Security at bedside  Patient ultimately ambulated out of department        Gloria Valencia RN  01/04/22 4078

## 2022-01-04 NOTE — ED NOTES
Aware of isolation orders, unable to place pt on isolation precautions due to no available rooms at this time  Pt maintaining wearing mask         Chelita Clancy, RN  01/04/22 [address], RN  01/04/22 8530

## 2022-01-04 NOTE — ED NOTES
Patient in the wating room yelling at staff because there are not rooms available for her, was noted to be videoing and taking pictures of staff  Was educated that as soon as a bed was available she would be brought back        Marilyn Goodpasture, RN  01/04/22 9085

## 2023-04-19 ENCOUNTER — APPOINTMENT (OUTPATIENT)
Dept: LAB | Facility: HOSPITAL | Age: 33
End: 2023-04-19

## 2023-04-19 DIAGNOSIS — R19.7 NAUSEA VOMITING AND DIARRHEA: ICD-10-CM

## 2023-04-19 DIAGNOSIS — R11.2 NAUSEA VOMITING AND DIARRHEA: ICD-10-CM

## 2023-04-19 DIAGNOSIS — R10.84 GENERALIZED ABDOMINAL PAIN: ICD-10-CM

## 2023-04-19 LAB — CRP SERPL QL: 5.5 MG/L

## 2023-04-21 LAB
ENDOMYSIUM IGA SER QL: NEGATIVE
GLIADIN PEPTIDE IGA SER-ACNC: 7 UNITS (ref 0–19)
GLIADIN PEPTIDE IGG SER-ACNC: 9 UNITS (ref 0–19)
IGA SERPL-MCNC: 236 MG/DL (ref 87–352)
TTG IGA SER-ACNC: <2 U/ML (ref 0–3)
TTG IGG SER-ACNC: 3 U/ML (ref 0–5)

## 2023-04-24 ENCOUNTER — APPOINTMENT (OUTPATIENT)
Dept: LAB | Facility: CLINIC | Age: 33
End: 2023-04-24

## 2023-04-24 DIAGNOSIS — R11.2 NAUSEA VOMITING AND DIARRHEA: ICD-10-CM

## 2023-04-24 DIAGNOSIS — R10.84 GENERALIZED ABDOMINAL PAIN: ICD-10-CM

## 2023-04-24 DIAGNOSIS — R19.7 NAUSEA VOMITING AND DIARRHEA: ICD-10-CM

## 2023-04-30 LAB — CALPROTECTIN STL-MCNT: 104 UG/G (ref 0–120)

## 2023-06-01 ENCOUNTER — ANESTHESIA (OUTPATIENT)
Dept: GASTROENTEROLOGY | Facility: HOSPITAL | Age: 33
End: 2023-06-01

## 2023-06-01 ENCOUNTER — HOSPITAL ENCOUNTER (OUTPATIENT)
Dept: GASTROENTEROLOGY | Facility: HOSPITAL | Age: 33
Setting detail: OUTPATIENT SURGERY
End: 2023-06-01
Payer: COMMERCIAL

## 2023-06-01 ENCOUNTER — ANESTHESIA EVENT (OUTPATIENT)
Dept: GASTROENTEROLOGY | Facility: HOSPITAL | Age: 33
End: 2023-06-01

## 2023-06-01 VITALS
RESPIRATION RATE: 18 BRPM | HEART RATE: 73 BPM | TEMPERATURE: 97.2 F | OXYGEN SATURATION: 100 % | DIASTOLIC BLOOD PRESSURE: 56 MMHG | SYSTOLIC BLOOD PRESSURE: 93 MMHG

## 2023-06-01 DIAGNOSIS — R10.84 GENERALIZED ABDOMINAL PAIN: ICD-10-CM

## 2023-06-01 DIAGNOSIS — K52.9 COLITIS: ICD-10-CM

## 2023-06-01 DIAGNOSIS — R11.2 NAUSEA VOMITING AND DIARRHEA: ICD-10-CM

## 2023-06-01 DIAGNOSIS — R63.4 WEIGHT LOSS: ICD-10-CM

## 2023-06-01 DIAGNOSIS — R19.7 NAUSEA VOMITING AND DIARRHEA: ICD-10-CM

## 2023-06-01 LAB
EXT PREGNANCY TEST URINE: NEGATIVE
EXT. CONTROL: NORMAL

## 2023-06-01 PROCEDURE — 81025 URINE PREGNANCY TEST: CPT | Performed by: STUDENT IN AN ORGANIZED HEALTH CARE EDUCATION/TRAINING PROGRAM

## 2023-06-01 PROCEDURE — 88305 TISSUE EXAM BY PATHOLOGIST: CPT | Performed by: PATHOLOGY

## 2023-06-01 PROCEDURE — 88312 SPECIAL STAINS GROUP 1: CPT | Performed by: PATHOLOGY

## 2023-06-01 RX ORDER — LIDOCAINE HYDROCHLORIDE 20 MG/ML
INJECTION, SOLUTION EPIDURAL; INFILTRATION; INTRACAUDAL; PERINEURAL AS NEEDED
Status: DISCONTINUED | OUTPATIENT
Start: 2023-06-01 | End: 2023-06-01

## 2023-06-01 RX ORDER — PROPOFOL 10 MG/ML
INJECTION, EMULSION INTRAVENOUS AS NEEDED
Status: DISCONTINUED | OUTPATIENT
Start: 2023-06-01 | End: 2023-06-01

## 2023-06-01 RX ORDER — SODIUM CHLORIDE, SODIUM LACTATE, POTASSIUM CHLORIDE, CALCIUM CHLORIDE 600; 310; 30; 20 MG/100ML; MG/100ML; MG/100ML; MG/100ML
125 INJECTION, SOLUTION INTRAVENOUS CONTINUOUS
Status: DISCONTINUED | OUTPATIENT
Start: 2023-06-01 | End: 2023-06-05 | Stop reason: HOSPADM

## 2023-06-01 RX ORDER — PROPOFOL 10 MG/ML
INJECTION, EMULSION INTRAVENOUS CONTINUOUS PRN
Status: DISCONTINUED | OUTPATIENT
Start: 2023-06-01 | End: 2023-06-01

## 2023-06-01 RX ADMIN — SODIUM CHLORIDE, SODIUM LACTATE, POTASSIUM CHLORIDE, AND CALCIUM CHLORIDE 125 ML/HR: .6; .31; .03; .02 INJECTION, SOLUTION INTRAVENOUS at 08:02

## 2023-06-01 RX ADMIN — PROPOFOL 100 MG: 10 INJECTION, EMULSION INTRAVENOUS at 08:36

## 2023-06-01 RX ADMIN — PROPOFOL 130 MCG/KG/MIN: 10 INJECTION, EMULSION INTRAVENOUS at 08:36

## 2023-06-01 RX ADMIN — LIDOCAINE HYDROCHLORIDE 50 MG: 20 INJECTION, SOLUTION EPIDURAL; INFILTRATION; INTRACAUDAL; PERINEURAL at 08:36

## 2023-06-01 NOTE — H&P
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  There is mild fatty infiltration liver  Liver enzymes were completely normal   Her white count was elevated in ER 12 62  Blood test for celiac disease were negative  C-reactive protein was mildly elevated at 5 5  Patient has been feeling better since her visit in the office  She has not had any further constipation or diarrhea  She will use dicyclomine intermittently for abdominal cramping which seems to help  She does not require this on a regular basis  She does smoke tobacco   She does smoke marijuana  She does not drink alcohol  There is no family history colon cancer colon polyps or inflammatory bowel disease  REVIEW OF SYSTEMS: Per the HPI, and otherwise unremarkable  Historical Information   Past Medical History:   Diagnosis Date   • Asthma      Past Surgical History:   Procedure Laterality Date   • CYST REMOVAL     • SHOULDER SURGERY      R shoulder   • TONSILLECTOMY       Social History   Social History     Substance and Sexual Activity   Alcohol Use Not Currently    Comment: socailly     Social History     Substance and Sexual Activity   Drug Use Yes   • Types: Marijuana     Social History     Tobacco Use   Smoking Status Every Day   • Packs/day: 1 00   • Types: Cigarettes   Smokeless Tobacco Never     History reviewed  No pertinent family history      Meds/Allergies       Current Outpatient Medications:   •  acetaminophen (TYLENOL) 325 mg tablet  •  albuterol (ProAir HFA) 90 mcg/act inhaler  •  Arthritis Pain Relief 650 MG CR tablet  •  ascorbic acid (VITAMIN C) 1000 MG tablet  •  calcium carbonate-vitamin D (OSCAL-D) 500 mg-200 units per tablet  •  dicyclomine (BENTYL) 20 mg tablet  •  HYDROcodone-acetaminophen (NORCO) 5-325 mg per tablet  •  ibuprofen (MOTRIN) 600 mg tablet  •  Multiple Vitamin (multivitamin) capsule  •  ondansetron (Zofran ODT) 4 mg disintegrating tablet  •  ondansetron (ZOFRAN) 4 mg tablet  •  oxyCODONE-acetaminophen (PERCOCET) 5-325 mg per tablet  •  pantoprazole (PROTONIX) 40 mg tablet  •  polyethylene glycol (GOLYTELY) 4000 mL solution  •  Sennosides (Senna) 8 6 MG CAPS    Current Facility-Administered Medications:   •  lactated ringers infusion, 125 mL/hr, Intravenous, Continuous, 125 mL/hr at 06/01/23 0802    No Known Allergies    Objective     /61   Pulse 81   Temp 97 6 °F (36 4 °C) (Temporal)   Resp 18   LMP 05/04/2023 (Approximate)   SpO2 100%       PHYSICAL EXAM    Gen: NAD  Head: NCAT  CV: RRR  CHEST: Clear  ABD: soft, NT/ND  EXT: no edema      ASSESSMENT/PLAN:  This is a 29y o  year old female here for colonoscopy, and she is stable and optimized for her procedure

## 2023-06-01 NOTE — ANESTHESIA POSTPROCEDURE EVALUATION
Post-Op Assessment Note    CV Status:  Stable  Pain Score: 0    Pain management: adequate     Mental Status:  Arousable   Hydration Status:  Stable   PONV Controlled:  None   Airway Patency:  Patent      Post Op Vitals Reviewed: Yes      Staff: CRNA         No notable events documented      BP   105/63   Temp      Pulse  80   Resp   20   SpO2   100

## 2023-06-07 PROCEDURE — 88312 SPECIAL STAINS GROUP 1: CPT | Performed by: PATHOLOGY

## 2023-06-07 PROCEDURE — 88305 TISSUE EXAM BY PATHOLOGIST: CPT | Performed by: PATHOLOGY

## 2023-06-10 NOTE — RESULT ENCOUNTER NOTE
Please inform patient that colon polyps were all benign, 1 polyp was precancerous called a tubular adenoma, this was completely removed  Biopsy from the rectum and from the redness in the colon did not reveal any evidence of active colitis or chronic colitis  Based upon these findings I would recommend repeating a colonoscopy in 5 years  Please recall for repeat colonoscopy in 5 years  She should follow-up in the gastroenterology office with [facility]    Thank you

## 2023-07-05 ENCOUNTER — OFFICE VISIT (OUTPATIENT)
Dept: GASTROENTEROLOGY | Facility: CLINIC | Age: 33
End: 2023-07-05
Payer: COMMERCIAL

## 2023-07-05 VITALS
BODY MASS INDEX: 30.49 KG/M2 | HEIGHT: 65 IN | RESPIRATION RATE: 18 BRPM | DIASTOLIC BLOOD PRESSURE: 76 MMHG | HEART RATE: 94 BPM | SYSTOLIC BLOOD PRESSURE: 112 MMHG | WEIGHT: 183 LBS | OXYGEN SATURATION: 98 %

## 2023-07-05 DIAGNOSIS — K52.9 COLITIS: Primary | ICD-10-CM

## 2023-07-05 DIAGNOSIS — R10.84 GENERALIZED ABDOMINAL PAIN: ICD-10-CM

## 2023-07-05 DIAGNOSIS — R11.2 NAUSEA AND VOMITING, UNSPECIFIED VOMITING TYPE: ICD-10-CM

## 2023-07-05 DIAGNOSIS — K76.0 HEPATIC STEATOSIS: ICD-10-CM

## 2023-07-05 PROCEDURE — 99214 OFFICE O/P EST MOD 30 MIN: CPT | Performed by: PHYSICIAN ASSISTANT

## 2023-07-05 RX ORDER — DICYCLOMINE HCL 20 MG
20 TABLET ORAL 2 TIMES DAILY PRN
Qty: 90 TABLET | Refills: 1 | Status: SHIPPED | OUTPATIENT
Start: 2023-07-05

## 2023-07-05 NOTE — PATIENT INSTRUCTIONS
For now please continue with monitoring your stool output. You can try a fiber supplement like Benefiber, Metamucil, Citrucel, to help continue to regulate your bowel habits and add bulk and formed to your stool. You can use the Bentyl/dicyclomine as needed for the episodes of cramping and urgent loose stool. Caution as this can cause constipation and drowsiness. If you have worsening of the nausea and vomiting, please do let us know. We may need to consider checking a gastric emptying study. There is always a possibility that this may be related to cannabis hyperemesis syndrome. We did note that you have a finding of fatty liver, which is nonalcoholic fatty liver. We generally recommend controlling other chronic conditions that we are able to control such as blood pressure, blood sugar, triglycerides. We also recommend slow sustained weight loss if possible. For your history of colon polyps, you will need another colonoscopy in 5 years. You should let any first-degree relative know that you had adenomas at a young age.

## 2023-07-05 NOTE — PROGRESS NOTES
[facility] - Outpatient Follow-up Note  Oneyda Valencia 29 y.o. female MRN: [id number]  Encounter: [id number]    ASSESSMENT AND PLAN:      1. Colitis  2. Generalized abdominal pain    Patient had ER evaluation in 04/2023 for progressive abdominal pain, nausea, and diarrhea, CT findings were consistent with a pancolitis and she was treated empirically with Cipro/Flagyl. She did have a fecal calprotectin checked at that time which was 104 and CRP was 5.5. Testing for celiac disease was negative. She underwent colonoscopy which commented on the appearance of left-sided healing colitis and biopsies were unremarkable for any evidence of acute or chronic colitis. We did review given self-limited nature and unremarkable biopsies, suspicious that this may have been an infectious colitis. I do suspect she has underlying irritable bowel syndrome, and we will continue to monitor her stool output at this time. She is no longer having constipation and she shares that as needed use of antispasmodic has been markedly helpful. A refill has been completed today. Continue to monitor stool output. Would encourage looking into the low FODMAP diet, Gas-X, and IBgard. If symptoms recur, would recommend repeating a fecal calprotectin. - dicyclomine (BENTYL) 20 mg tablet; Take 1 tablet (20 mg total) by mouth 2 (two) times a day as needed (abdominal cramping)  Dispense: 90 tablet; Refill: 1    3. Nausea/vomiting    This symptom is sporadic in nature and predates her ER visit in 04/2023. In fact, she was hospitalized in 2021 for intractable nausea and nonbloody emesis. She is not having any significant persistent upper GI symptoms, and endoscopy from 2021 was largely unremarkable. She has discontinued NSAIDs. We did review that symptoms may be secondary to gastritis, gastroparesis, cyclical vomiting/cannabinoid hyperemesis, other etiology.   Given infrequent symptoms and no new alarm features, we will continue to monitor. She should continue with small frequent meals and as needed use of antiemetics. Should symptoms recur, would consider gastric emptying study +/- repeat EGD. May need to consider discontinuation of cannabis if index of suspicion for cannabinoid hyperemesis syndrome is high. 4. Hepatic steatosis    Incidental finding on imaging. Reviewed diagnosis and possible sequelae of disease. Will plan to calculate NAFLD fibrosis score at f/u OV. Discussed recommendations in regards to fatty liver includin. Strict control of contributing comorbidities (obesity, prediabetes/diabetes, hypertension, and hypertriglyceridemia). 2. Weight loss of approx 10-15% of patient's current body weight over a period of 6-12 months through low fat diet and cardiovascular exercise as tolerated. 3. Limiting alcohol consumption, preferably complete abstinence. 4. Monitor hepatic function every 6 months with routine labs. We will follow up with pt in approx 3 months.   ______________________________________________________________________    SUBJECTIVE: Patient is a 29 y.o. female who presents today for follow-up regarding colonoscopy. Pmhx sig for chronic nausea, tobacco use disorder, cannabis use. Pt is new to me. She was evaluated by colleague Ngozi Hendricks in 2023. She had noted chronic cyclical abdominal pain and constipation with subsequent diarrhea, however more recently developed n/v/d which resulted in ER evaluation. CT at that time commented on diffuse non-specific colitis. She was treated with cipro/flagyl. She had a fecal calprotectin completed at that time which was borderline at 104. She underwent colonoscopy in in 2023 which commented on mild patchy erythematous mucosa in the sigmoid and rectum, though biopsies were unremarkable. It was thought to represent a healing colitis. She also had several polyps removed and one was noted to be adenomatous.      She also has had a history of nausea and non-bloody emesis, and previously underwent evaluation for this in 03/2021 during hospital admission when a CT showed a distended stomach filled with food and air, thought to be secondary to a viral gastroenteritis. EGD was unremarkable and biopsies noted gastritis, though were negative for h pylori or intestinal metaplasia. 07/05/23:     Pt is feeling much improved since ER evaluation and last OV. She notes that her stools are on the soft side though she is not having diarrhea. Having 2-3 BMs daily, denies nocturnal awakening. She notes intermittent abdominal cramping, at times prior to defecation, and for this she takes bentyl with good relief. She denies any straining or constipation. She denies any BRBPR or melenic stools. Pertaining to her UGI tract, she denies any heartburn, indigestion, dysphagia or odynophagia. She does have some intermittent bouts of nausea and non-bloody emesis. She feels these are random, perhaps triggered by certain oral intake. She denies any abnormal weight loss. She does use cannabis regularly. She d/c NSAIDs.    04/2023: CT A/P: Diffuse colonic wall thickening and minimal inflammatory change raising suspicion for nonspecific infectious or inflammatory colitis, uncomplicated. 04/2023: RUQ US: mild hepatic steatosis   04/2023: fecal calprotectin 104, celiac disease panel negative, CRP 5.5, WBC 12.62, Hb 13.1, MCV 87, Plt 434, BUN 13, Cr 0.67, AST 15, ALT 10, ALP 63, albumin 4.5, t bili 0.35, lipase 8    Endoscopic history:   EGD: 03/2021: normal   A. Stomach, gastric bx r/o h. pylori cold forcep: Antral and oxyntic mucosa with chronic inactive gastritis. No Helicobacter pylori organisms are identified on the routine stain.  No intestinal metaplasia, dysplasia or malignancy is identified  Colon: 06/2023: Normal terminal ileum; 4 mm sessile polyp cecum; mm polyp at 20 cm; Mild patchy erythema without erosion between 20 and 30 cm;  Very mild erythema with small erosion in rectum   A. Large Intestine, Cecum, 4MM cecal polyp: Tubular adenoma. Negative for high-grade dysplasia and malignancy. B. Rectum, patchy mucosa: Colorectal mucosa with single granuloma associated with a lymphoid aggregate. No significant architectural changes or acute inflammation. Negative for dysplasia and malignancy. C. Large Intestine, Sigmoid Colon, polyp @ 20:Hyperplastic polyp. Negative for dysplasia and malignancy. D. Rectum, r/o proctitis: Colorectal mucosa with few granulomas associated with a lymphoid aggregate. No significant architectural changes or acute inflammation. Negative for dysplasia and malignancy. E. Rectum, polyp: Polypoid colonic mucosa with surface hyperplastic changes and associated lymphoid aggregate. Negative for dysplasia and malignanc    Review of Systems   Constitutional: Negative for fever. Gastrointestinal: Negative for constipation, diarrhea, nausea and vomiting. Genitourinary: Negative for dysuria, frequency and hematuria. Musculoskeletal: Negative for arthralgias and myalgias. Neurological: Negative for headaches. Otherwise Per HPI    Historical Information   Past Medical History:   Diagnosis Date   • Asthma      Past Surgical History:   Procedure Laterality Date   • CYST REMOVAL     • SHOULDER SURGERY      R shoulder   • TONSILLECTOMY       Social History   Social History     Substance and Sexual Activity   Alcohol Use Not Currently    Comment: socailly     Social History     Substance and Sexual Activity   Drug Use Yes   • Types: Marijuana     Social History     Tobacco Use   Smoking Status Every Day   • Packs/day: 1.00   • Types: Cigarettes   Smokeless Tobacco Never     History reviewed. No pertinent family history.     Meds/Allergies       Current Outpatient Medications:   •  acetaminophen (TYLENOL) 325 mg tablet  •  albuterol (ProAir HFA) 90 mcg/act inhaler  •  Arthritis Pain Relief 650 MG CR tablet  •  dicyclomine (BENTYL) 20 mg tablet  •  ondansetron (Zofran ODT) 4 mg disintegrating tablet  •  Sennosides (Senna) 8.6 MG CAPS  •  Multiple Vitamin (multivitamin) capsule    No Known Allergies        Objective     Blood pressure 112/76, pulse 94, resp. rate 18, height 5' 5" (1.651 m), weight 83 kg (183 lb), SpO2 98 %. Body mass index is 30.45 kg/m². Physical Exam  Vitals and nursing note reviewed. Constitutional:       Appearance: Normal appearance. HENT:      Head: Normocephalic and atraumatic. Eyes:      General: No scleral icterus. Conjunctiva/sclera: Conjunctivae normal.   Cardiovascular:      Rate and Rhythm: Normal rate. Pulmonary:      Effort: Pulmonary effort is normal. No respiratory distress. Abdominal:      General: Bowel sounds are normal. There is no distension. Palpations: Abdomen is soft. There is no mass. Tenderness: There is no abdominal tenderness. There is no guarding or rebound. Skin:     General: Skin is warm and dry. Coloration: Skin is not jaundiced. Neurological:      General: No focal deficit present. Mental Status: She is alert and oriented to person, place, and time. Psychiatric:         Mood and Affect: Mood normal.         Behavior: Behavior normal.         Lab Results:   No visits with results within 1 Day(s) from this visit.    Latest known visit with results is:   Hospital Outpatient Visit on 06/01/2023   Component Date Value   • EXT Preg Test, Ur 06/01/2023 Negative    • Control 06/01/2023 Valid    • Case Report 06/01/2023                      Value:Surgical Pathology Report                         Case: [id number]                                   Authorizing Provider:  Toya Rico DO  Collected:           06/01/2023 9217              Ordering Location:     [facility] Received:            06/01/2023 1444                                     Endoscopy                                                                    Pathologist: Troy Singleton MD                                                                 Specimens:   A) - Large Intestine, Cecum, 4MM CECAL POLYP                                                        B) - Rectum, PATCHY MUCOSA                                                                          C) - Large Intestine, Sigmoid Colon, POLYP @ 20                                                     D) - Rectum, MILD PROCTITIS                                                                                                   E) - Rectum, POLYP                                                                        • Final Diagnosis 06/01/2023                      Value: This result contains rich text formatting which cannot be displayed here. • Additional Information 06/01/2023                      Value: This result contains rich text formatting which cannot be displayed here. • Synoptic Checklist 06/01/2023                      Value:                            COLON/RECTUM POLYP FORM - GI - All Specimens                                                                                     :    Adenoma(s)     • Gross Description 06/01/2023                      Value: This result contains rich text formatting which cannot be displayed here. Radiology Results:   No results found. James Zuniga PA-C    **Please note:  Dictation voice to text software may have been used in the creation of this record. Occasional wrong word or “sound alike” substitutions may have occurred due to the inherent limitations of voice recognition software. Read the chart carefully and recognize, using context, where substitutions have occurred. **

## 2023-09-29 ENCOUNTER — OFFICE VISIT (OUTPATIENT)
Dept: GASTROENTEROLOGY | Facility: CLINIC | Age: 33
End: 2023-09-29
Payer: COMMERCIAL

## 2023-09-29 VITALS
SYSTOLIC BLOOD PRESSURE: 123 MMHG | OXYGEN SATURATION: 98 % | HEART RATE: 80 BPM | TEMPERATURE: 97.7 F | HEIGHT: 65 IN | WEIGHT: 189 LBS | BODY MASS INDEX: 31.49 KG/M2 | DIASTOLIC BLOOD PRESSURE: 78 MMHG

## 2023-09-29 DIAGNOSIS — K52.9 COLITIS: ICD-10-CM

## 2023-09-29 DIAGNOSIS — R11.2 NAUSEA AND VOMITING, UNSPECIFIED VOMITING TYPE: ICD-10-CM

## 2023-09-29 DIAGNOSIS — R19.4 CHANGE IN BOWEL HABITS: Primary | ICD-10-CM

## 2023-09-29 DIAGNOSIS — R10.84 GENERALIZED ABDOMINAL PAIN: ICD-10-CM

## 2023-09-29 DIAGNOSIS — K76.0 HEPATIC STEATOSIS: ICD-10-CM

## 2023-09-29 DIAGNOSIS — Z86.010 PERSONAL HISTORY OF COLONIC POLYPS: ICD-10-CM

## 2023-09-29 PROCEDURE — 99214 OFFICE O/P EST MOD 30 MIN: CPT | Performed by: PHYSICIAN ASSISTANT

## 2023-09-29 RX ORDER — ONDANSETRON 4 MG/1
4 TABLET, ORALLY DISINTEGRATING ORAL EVERY 8 HOURS PRN
Qty: 60 TABLET | Refills: 3 | Status: SHIPPED | OUTPATIENT
Start: 2023-09-29

## 2023-09-29 NOTE — PATIENT INSTRUCTIONS
For now, continue on daily fiber. Use the nausea and cramping pills as needed for flareups. If and when you have a flareup, or, mostly clear liquids until the pain and GI symptoms pass. Please have blood work completed at your convenience. If you are noticing more frequent flareups of cramping and urgent loose stool, please drop off the stool tests.

## 2023-09-29 NOTE — PROGRESS NOTES
[facility] - Outpatient Follow-up Note  Indira Patel 34 y.o. female MRN: [id number]  Encounter: [id number]    ASSESSMENT AND PLAN:      1. Change in bowel habits  2. Colitis  3. Generalized abdominal pain    Patient with history of colitis on CT scan treated with antibiotic therapy. Fecal calprotectin 104 at that time. Subsequent colonoscopy commented on appearance of left-sided healing colitis and biopsies were unremarkable for evidence of acute or chronic colitis. We did review once again that her severe symptoms may have been secondary to a self-limited infectious process. She may have underlying irritable bowel syndrome, and she utilizes antispasmodics as needed for symptom control. If her stool output becomes increasingly loose, urgent and associated with significant abdominal pain, I encouraged pt to then have stool studies completed which were ordered today and are active. We would want to ensure no chronic infections or persistent elevation in a fecal calprotectin, as if this were to be the case, additional investigation may be warranted. Okay for PRN use of Gas-x, Bean-o, IBGard.     - CBC and differential; Future  - C-reactive protein; Future  - Comprehensive metabolic panel; Future  - Calprotectin,Fecal; Future  - H. pylori antigen, stool; Future  - Ova and parasite examination; Future  - Stool Enteric Bacterial Panel by PCR; Future  - Giardia antigen; Future  - ondansetron (Zofran ODT) 4 mg disintegrating tablet; Take 1 tablet (4 mg total) by mouth every 8 (eight) hours as needed for nausea or vomiting  Dispense: 60 tablet; Refill: 3    4. Personal history of colon polyps    Adenomatous polyp removed during her colonoscopy, repeat in 5 years for surveillance purposes. 5. Nausea and vomiting, unspecified vomiting type    Very infrequent symptoms at this time, typically only related to severe bouts of abdominal pain.   No significant heartburn or indigestion symptoms at baseline. EGD from 2021 was largely unremarkable. Recommend small frequent meals and as needed usage of antiemetics. Should symptoms recur and be more persistent, would recommend repeating EGD plus gastric emptying study. 5. Hepatic steatosis    Previously discussed at last OV. Continue with control of comorbidities and slow sustained weight loss. Check labs now to calculate NAFLD fibrosis score. We will follow up in 3-6 months to reassess symptoms. ______________________________________________________________________    SUBJECTIVE: Patient is a 34 y.o. female who presents today for follow-up regarding abdominal pain. Pmhx sig for chronic nausea, tobacco use disorder, cannabis use. Pt was last evaluated in clinic in 07/2023. She was following with GI for hx of colitis. She was found to have colitis on CT in 04/2023 and was tx with abx. Fecal rober 104 and she underwent colonoscopy in 06/2023 which demonstrated mild patchy erythematous mucosa in the sigmoid and rectum, though biopsies were unremarkable. It was thought to represent a healing colitis. She also had several polyps removed and one was noted to be adenomatous. She was at last OV feeling much improved and managing symptoms with as needed anti-emetics and antispasmodics. 09/29/23:     Pt notes that since last office visit, has overall been feeling well. Having soft brown stools daily, no BRBPR or melena. No watery diarrhea. No nocturnal Bms. Had a few episodes of sharp stabbing abd pain for which she took bentyl with resolution of symptoms. Denies any unintentional weight loss over past 6 months. No new rashes, mouth ulcers, etc.     Pt notes that her episodes of nausea are very infrequent and typically only occur in the setting of severe abd pain. She takes anti-emetics at that time with adequate relief.  She otherwise denies heartburn, indigestion, nausea, emesis, dysphagia or odynophagia.     04/2023: CT A/P: Diffuse colonic wall thickening and minimal inflammatory change raising suspicion for nonspecific infectious or inflammatory colitis, uncomplicated. 04/2023: RUQ US: mild hepatic steatosis   04/2023: fecal calprotectin 104, celiac disease panel negative, CRP 5.5, WBC 12.62, Hb 13.1, MCV 87, Plt 434, BUN 13, Cr 0.67, AST 15, ALT 10, ALP 63, albumin 4.5, t bili 0.35, lipase 8     Endoscopic history:   EGD: 03/2021: normal   A. Stomach, gastric bx r/o h. pylori cold forcep: Antral and oxyntic mucosa with chronic inactive gastritis. No Helicobacter pylori organisms are identified on the routine stain. No intestinal metaplasia, dysplasia or malignancy is identified  Colon: 06/2023: Normal terminal ileum; 4 mm sessile polyp cecum; mm polyp at 20 cm; Mild patchy erythema without erosion between 20 and 30 cm;  Very mild erythema with small erosion in rectum   A. Large Intestine, Cecum, 4MM cecal polyp: Tubular adenoma. Negative for high-grade dysplasia and malignancy. B. Rectum, patchy mucosa: Colorectal mucosa with single granuloma associated with a lymphoid aggregate. No significant architectural changes or acute inflammation. Negative for dysplasia and malignancy. C. Large Intestine, Sigmoid Colon, polyp @ 20:Hyperplastic polyp. Negative for dysplasia and malignancy. D. Rectum, r/o proctitis: Colorectal mucosa with few granulomas associated with a lymphoid aggregate. No significant architectural changes or acute inflammation. Negative for dysplasia and malignancy.    E. Rectum, polyp: Polypoid colonic mucosa with surface hyperplastic changes and associated lymphoid aggregate. Negative for dysplasia and malignancy    Review of Systems   Gastrointestinal: Positive for abdominal pain.    Otherwise Per HPI    Historical Information   Past Medical History:   Diagnosis Date   • Asthma      Past Surgical History:   Procedure Laterality Date   • CYST REMOVAL     • SHOULDER SURGERY      R shoulder   • TONSILLECTOMY       Social History   Social History     Substance and Sexual Activity   Alcohol Use Not Currently    Comment: socailly     Social History     Substance and Sexual Activity   Drug Use Yes   • Types: Marijuana     Social History     Tobacco Use   Smoking Status Every Day   • Packs/day: 1.00   • Types: Cigarettes   Smokeless Tobacco Never     History reviewed. No pertinent family history. Meds/Allergies       Current Outpatient Medications:   •  acetaminophen (TYLENOL) 325 mg tablet  •  albuterol (ProAir HFA) 90 mcg/act inhaler  •  Arthritis Pain Relief 650 MG CR tablet  •  dicyclomine (BENTYL) 20 mg tablet  •  ondansetron (Zofran ODT) 4 mg disintegrating tablet  •  Sennosides (Senna) 8.6 MG CAPS  •  Multiple Vitamin (multivitamin) capsule    No Known Allergies        Objective     Blood pressure 123/78, pulse 80, temperature 97.7 °F (36.5 °C), temperature source Tympanic, height 5' 5" (1.651 m), weight 85.7 kg (189 lb), SpO2 98 %. Body mass index is 31.45 kg/m². Physical Exam  Vitals and nursing note reviewed. Constitutional:       Appearance: Normal appearance. HENT:      Head: Normocephalic and atraumatic. Eyes:      General: No scleral icterus. Conjunctiva/sclera: Conjunctivae normal.   Cardiovascular:      Rate and Rhythm: Normal rate. Pulmonary:      Effort: Pulmonary effort is normal. No respiratory distress. Abdominal:      General: Bowel sounds are normal. There is no distension. Palpations: Abdomen is soft. Tenderness: There is no abdominal tenderness. There is no guarding or rebound. Skin:     General: Skin is warm and dry. Coloration: Skin is not jaundiced. Neurological:      General: No focal deficit present. Mental Status: She is alert and oriented to person, place, and time. Psychiatric:         Mood and Affect: Mood normal.         Behavior: Behavior normal.       Lab Results:   No visits with results within 1 Day(s) from this visit.    Latest known visit with results is:   Hospital Outpatient Visit on 06/01/2023   Component Date Value   • EXT Preg Test, Ur 06/01/2023 Negative    • Control 06/01/2023 Valid    • Case Report 06/01/2023                      Value:Surgical Pathology Report                         Case: [id number]                                   Authorizing Provider:  Berlin Rivera DO  Collected:           06/01/2023 1973              Ordering Location:     [facility] Received:            06/01/2023 1444                                     Endoscopy                                                                    Pathologist:           Andrez Roque MD                                                                 Specimens:   A) - Large Intestine, Cecum, 4MM CECAL POLYP                                                        B) - Rectum, PATCHY MUCOSA                                                                          C) - Large Intestine, Sigmoid Colon, POLYP @ 20                                                     D) - Rectum, MILD PROCTITIS                                                                                                   E) - Rectum, POLYP                                                                        • Final Diagnosis 06/01/2023                      Value: This result contains rich text formatting which cannot be displayed here. • Additional Information 06/01/2023                      Value: This result contains rich text formatting which cannot be displayed here. • Synoptic Checklist 06/01/2023                      Value:                            COLON/RECTUM POLYP FORM - GI - All Specimens                                                                                     :    Adenoma(s)     • Gross Description 06/01/2023                      Value: This result contains rich text formatting which cannot be displayed here. Radiology Results:   No results found. Eunice Mercado PA-C    **Please note:  Dictation voice to text software may have been used in the creation of this record. Occasional wrong word or “sound alike” substitutions may have occurred due to the inherent limitations of voice recognition software. Read the chart carefully and recognize, using context, where substitutions have occurred. **

## 2023-11-05 ENCOUNTER — HOSPITAL ENCOUNTER (EMERGENCY)
Facility: HOSPITAL | Age: 33
Discharge: HOME | End: 2023-11-05
Attending: EMERGENCY MEDICINE
Payer: COMMERCIAL

## 2023-11-05 VITALS
DIASTOLIC BLOOD PRESSURE: 69 MMHG | WEIGHT: 189 LBS | BODY MASS INDEX: 31.45 KG/M2 | RESPIRATION RATE: 18 BRPM | HEART RATE: 80 BPM | OXYGEN SATURATION: 98 % | TEMPERATURE: 98.3 F | SYSTOLIC BLOOD PRESSURE: 119 MMHG

## 2023-11-05 DIAGNOSIS — R11.15 CYCLICAL VOMITING SYNDROME: Primary | ICD-10-CM

## 2023-11-05 LAB
ALBUMIN SERPL BCP-MCNC: 4.8 G/DL (ref 3.5–5)
ALP SERPL-CCNC: 60 U/L (ref 34–104)
ALT SERPL W P-5'-P-CCNC: 13 U/L (ref 7–52)
ANION GAP SERPL CALCULATED.3IONS-SCNC: 13 MMOL/L
AST SERPL W P-5'-P-CCNC: 22 U/L (ref 13–39)
BACTERIA UR QL AUTO: ABNORMAL /HPF
BASOPHILS # BLD AUTO: 0.05 THOUSANDS/ÂΜL (ref 0–0.1)
BASOPHILS NFR BLD AUTO: 0 % (ref 0–1)
BILIRUB SERPL-MCNC: 0.58 MG/DL (ref 0.2–1)
BILIRUB UR QL STRIP: ABNORMAL
BUN SERPL-MCNC: 17 MG/DL (ref 5–25)
CALCIUM SERPL-MCNC: 10.1 MG/DL (ref 8.4–10.2)
CAOX CRY URNS QL MICRO: ABNORMAL /HPF
CHLORIDE SERPL-SCNC: 101 MMOL/L (ref 96–108)
CLARITY UR: CLEAR
CO2 SERPL-SCNC: 23 MMOL/L (ref 21–32)
COLOR UR: YELLOW
CREAT SERPL-MCNC: 0.69 MG/DL (ref 0.6–1.3)
EOSINOPHIL # BLD AUTO: 0.01 THOUSAND/ÂΜL (ref 0–0.61)
EOSINOPHIL NFR BLD AUTO: 0 % (ref 0–6)
ERYTHROCYTE [DISTWIDTH] IN BLOOD BY AUTOMATED COUNT: 14.5 % (ref 11.6–15.1)
EXT PREGNANCY TEST URINE: NEGATIVE
EXT. CONTROL: NORMAL
FLUAV RNA RESP QL NAA+PROBE: NEGATIVE
FLUBV RNA RESP QL NAA+PROBE: NEGATIVE
GFR SERPL CREATININE-BSD FRML MDRD: 116 ML/MIN/1.73SQ M
GLUCOSE SERPL-MCNC: 114 MG/DL (ref 65–140)
GLUCOSE UR STRIP-MCNC: NEGATIVE MG/DL
HCT VFR BLD AUTO: 41.1 % (ref 34.8–46.1)
HGB BLD-MCNC: 13.2 G/DL (ref 11.5–15.4)
HGB UR QL STRIP.AUTO: ABNORMAL
IMM GRANULOCYTES # BLD AUTO: 0.03 THOUSAND/UL (ref 0–0.2)
IMM GRANULOCYTES NFR BLD AUTO: 0 % (ref 0–2)
KETONES UR STRIP-MCNC: ABNORMAL MG/DL
LEUKOCYTE ESTERASE UR QL STRIP: NEGATIVE
LIPASE SERPL-CCNC: 11 U/L (ref 11–82)
LYMPHOCYTES # BLD AUTO: 2.05 THOUSANDS/ÂΜL (ref 0.6–4.47)
LYMPHOCYTES NFR BLD AUTO: 15 % (ref 14–44)
MCH RBC QN AUTO: 27.3 PG (ref 26.8–34.3)
MCHC RBC AUTO-ENTMCNC: 32.1 G/DL (ref 31.4–37.4)
MCV RBC AUTO: 85 FL (ref 82–98)
MONOCYTES # BLD AUTO: 0.99 THOUSAND/ÂΜL (ref 0.17–1.22)
MONOCYTES NFR BLD AUTO: 7 % (ref 4–12)
MUCOUS THREADS UR QL AUTO: ABNORMAL
NEUTROPHILS # BLD AUTO: 10.25 THOUSANDS/ÂΜL (ref 1.85–7.62)
NEUTS SEG NFR BLD AUTO: 78 % (ref 43–75)
NITRITE UR QL STRIP: NEGATIVE
NON-SQ EPI CELLS URNS QL MICRO: ABNORMAL /HPF
NRBC BLD AUTO-RTO: 0 /100 WBCS
PH UR STRIP.AUTO: 6.5 [PH]
PLATELET # BLD AUTO: 270 THOUSANDS/UL (ref 149–390)
PMV BLD AUTO: 11 FL (ref 8.9–12.7)
POTASSIUM SERPL-SCNC: 3.9 MMOL/L (ref 3.5–5.3)
PROT SERPL-MCNC: 8.5 G/DL (ref 6.4–8.4)
PROT UR STRIP-MCNC: ABNORMAL MG/DL
RBC # BLD AUTO: 4.84 MILLION/UL (ref 3.81–5.12)
RBC #/AREA URNS AUTO: ABNORMAL /HPF
RSV RNA RESP QL NAA+PROBE: NEGATIVE
SARS-COV-2 RNA RESP QL NAA+PROBE: NEGATIVE
SODIUM SERPL-SCNC: 137 MMOL/L (ref 135–147)
SP GR UR STRIP.AUTO: 1.02 (ref 1–1.03)
UROBILINOGEN UR QL STRIP.AUTO: 0.2 E.U./DL
WBC # BLD AUTO: 13.38 THOUSAND/UL (ref 4.31–10.16)
WBC #/AREA URNS AUTO: ABNORMAL /HPF

## 2023-11-05 PROCEDURE — 81025 URINE PREGNANCY TEST: CPT | Performed by: EMERGENCY MEDICINE

## 2023-11-05 PROCEDURE — 99284 EMERGENCY DEPT VISIT MOD MDM: CPT

## 2023-11-05 PROCEDURE — 83690 ASSAY OF LIPASE: CPT | Performed by: EMERGENCY MEDICINE

## 2023-11-05 PROCEDURE — 85025 COMPLETE CBC W/AUTO DIFF WBC: CPT | Performed by: EMERGENCY MEDICINE

## 2023-11-05 PROCEDURE — 81001 URINALYSIS AUTO W/SCOPE: CPT | Performed by: EMERGENCY MEDICINE

## 2023-11-05 PROCEDURE — 96375 TX/PRO/DX INJ NEW DRUG ADDON: CPT

## 2023-11-05 PROCEDURE — 0241U HB NFCT DS VIR RESP RNA 4 TRGT: CPT | Performed by: EMERGENCY MEDICINE

## 2023-11-05 PROCEDURE — 96374 THER/PROPH/DIAG INJ IV PUSH: CPT

## 2023-11-05 PROCEDURE — 36415 COLL VENOUS BLD VENIPUNCTURE: CPT | Performed by: EMERGENCY MEDICINE

## 2023-11-05 PROCEDURE — 80053 COMPREHEN METABOLIC PANEL: CPT | Performed by: EMERGENCY MEDICINE

## 2023-11-05 PROCEDURE — 99284 EMERGENCY DEPT VISIT MOD MDM: CPT | Performed by: EMERGENCY MEDICINE

## 2023-11-05 RX ORDER — ONDANSETRON 2 MG/ML
4 INJECTION INTRAMUSCULAR; INTRAVENOUS ONCE
Status: COMPLETED | OUTPATIENT
Start: 2023-11-05 | End: 2023-11-05

## 2023-11-05 RX ORDER — DROPERIDOL 2.5 MG/ML
0.62 INJECTION, SOLUTION INTRAMUSCULAR; INTRAVENOUS ONCE
Status: COMPLETED | OUTPATIENT
Start: 2023-11-05 | End: 2023-11-05

## 2023-11-05 RX ADMIN — SODIUM CHLORIDE 1000 ML: 0.9 INJECTION, SOLUTION INTRAVENOUS at 01:28

## 2023-11-05 RX ADMIN — ONDANSETRON 4 MG: 2 INJECTION INTRAMUSCULAR; INTRAVENOUS at 01:34

## 2023-11-05 RX ADMIN — DROPERIDOL 0.62 MG: 2.5 INJECTION, SOLUTION INTRAMUSCULAR; INTRAVENOUS at 01:31

## 2023-11-05 NOTE — ED PROVIDER NOTES
History  Chief Complaint   Patient presents with    Vomiting     Onset 2 days ago. Started w/ diarrhea then went to vomiting. Pre-cancerous cells found from testing per GI     34year old F with a PMHx of recurrent abdominal pain who presents for abdominal pain. Abdominal pain is mostly epigastric, accompanied by nausea and vomiting, some diarrhea. No bloody BM. No fevers/chills, no sore throat, cough, or congestion. She does describe that her chest somewhat hurts because of all the vomiting. No dysuria or frequency. ROS otherwise negative. Prior to Admission Medications   Prescriptions Last Dose Informant Patient Reported? Taking? Arthritis Pain Relief 650 MG CR tablet   Yes No   Sig: TAKE 1 TABLET BY MOUTH EVERY 8 HOURS AS NEEDED FOR MODERATE PAIN   Multiple Vitamin (multivitamin) capsule   No No   Sig: Take 1 capsule by mouth daily for 10 days   Sennosides (Senna) 8.6 MG CAPS   Yes No   Sig: Take 8.6 mg by mouth   acetaminophen (TYLENOL) 325 mg tablet   No No   Sig: Take 2 tablets (650 mg total) by mouth every 6 (six) hours as needed for mild pain, headaches or fever   albuterol (ProAir HFA) 90 mcg/act inhaler   No No   Sig: Inhale 2 puffs every 4 (four) hours as needed for wheezing or shortness of breath (2 puffs eric 4-6 hours around the clock for the next 72 hours and then every 4 hours as needed)   dicyclomine (BENTYL) 20 mg tablet   No No   Sig: Take 1 tablet (20 mg total) by mouth 2 (two) times a day as needed (abdominal cramping)   ondansetron (Zofran ODT) 4 mg disintegrating tablet   No No   Sig: Take 1 tablet (4 mg total) by mouth every 8 (eight) hours as needed for nausea or vomiting      Facility-Administered Medications: None       Past Medical History:   Diagnosis Date    Asthma        Past Surgical History:   Procedure Laterality Date    CYST REMOVAL      SHOULDER SURGERY      R shoulder    TONSILLECTOMY         History reviewed. No pertinent family history.   I have reviewed and agree with the history as documented. E-Cigarette/Vaping    E-Cigarette Use Never User      E-Cigarette/Vaping Substances    Nicotine No     THC No     CBD No     Flavoring No     Other No     Unknown No      Social History     Tobacco Use    Smoking status: Every Day     Packs/day: 1.00     Types: Cigarettes    Smokeless tobacco: Never   Vaping Use    Vaping Use: Never used   Substance Use Topics    Alcohol use: Not Currently     Comment: socailly    Drug use: Yes     Types: Marijuana       Review of Systems   Constitutional:  Negative for chills and fever. HENT:  Negative for congestion, rhinorrhea and sore throat. Respiratory:  Negative for cough and shortness of breath. Cardiovascular:  Negative for chest pain and palpitations. Gastrointestinal:  Positive for abdominal pain, diarrhea, nausea and vomiting. Negative for constipation. Genitourinary:  Negative for difficulty urinating and flank pain. Musculoskeletal:  Negative for arthralgias. Neurological:  Negative for dizziness, weakness, light-headedness and headaches. Psychiatric/Behavioral:  Negative for agitation, behavioral problems and confusion. All other systems reviewed and are negative. Physical Exam  Physical Exam  Constitutional:       Appearance: She is well-developed. HENT:      Head: Normocephalic and atraumatic. Cardiovascular:      Rate and Rhythm: Normal rate and regular rhythm. Heart sounds: Normal heart sounds. No murmur heard. No friction rub. Pulmonary:      Effort: Pulmonary effort is normal. No respiratory distress. Breath sounds: Normal breath sounds. No wheezing or rales. Abdominal:      General: Bowel sounds are normal. There is no distension. Palpations: Abdomen is soft. Tenderness: There is no abdominal tenderness. Comments: She states her abdomen feels uncomfortable, but no focal tenderness identified, no guarding/rigidity.    Musculoskeletal:         General: Normal range of motion. Cervical back: Normal range of motion and neck supple. Skin:     General: Skin is warm. Neurological:      Mental Status: She is alert and oriented to person, place, and time. Coordination: Coordination normal.   Psychiatric:         Behavior: Behavior normal.         Thought Content:  Thought content normal.         Judgment: Judgment normal.         Vital Signs  ED Triage Vitals [11/05/23 0105]   Temperature Pulse Respirations Blood Pressure SpO2   98.3 °F (36.8 °C) 61 18 128/84 97 %      Temp Source Heart Rate Source Patient Position - Orthostatic VS BP Location FiO2 (%)   Temporal Monitor Lying Left arm --      Pain Score       No Pain           Vitals:    11/05/23 0105 11/05/23 0130 11/05/23 0206   BP: 128/84 115/71 119/69   Pulse: 61 61 80   Patient Position - Orthostatic VS: Lying Lying Lying         Visual Acuity      ED Medications  Medications   droperidol (INAPSINE) injection 0.625 mg (0.625 mg Intravenous Given 11/5/23 0131)   ondansetron (ZOFRAN) injection 4 mg (4 mg Intravenous Given 11/5/23 0134)   sodium chloride 0.9 % bolus 1,000 mL (0 mL Intravenous Stopped 11/5/23 0138)       Diagnostic Studies  Results Reviewed       Procedure Component Value Units Date/Time    POCT pregnancy, urine [[id number]]  (Normal) Resulted: 11/05/23 0202    Lab Status: Final result Updated: 11/05/23 0202     EXT Preg Test, Ur Negative     Control Valid    Urine Microscopic [[id number]]  (Abnormal) Collected: 11/05/23 0140    Lab Status: Final result Specimen: Urine, Clean Catch Updated: 11/05/23 0157     RBC, UA 2-4 /hpf      WBC, UA 2-4 /hpf      Epithelial Cells Occasional /hpf      Bacteria, UA Moderate /hpf      Ca Oxalate Maribel, UA Occasional /hpf      MUCUS THREADS Occasional    UA w Reflex to Microscopic w Reflex to Culture [[id number]]  (Abnormal) Collected: 11/05/23 0140    Lab Status: Final result Specimen: Urine, Clean Catch Updated: 11/05/23 0154     Color, UA Yellow     Clarity, UA Clear Specific Gravity, UA 1.025     pH, UA 6.5     Leukocytes, UA Negative     Nitrite, UA Negative     Protein, UA 30 (1+) mg/dl      Glucose, UA Negative mg/dl      Ketones, UA 15 (1+) mg/dl      Urobilinogen, UA 0.2 E.U./dl      Bilirubin, UA Small     Occult Blood, UA Trace-Intact    COVID/FLU/RSV [[id number]]  (Normal) Collected: 11/05/23 0137    Lab Status: Final result Specimen: Nares from Nose Updated: 11/05/23 0145     SARS-CoV-2 Negative     INFLUENZA A PCR Negative     INFLUENZA B PCR Negative     RSV PCR Negative    Narrative:      FOR PEDIATRIC PATIENTS - copy/paste COVID Guidelines URL to browser: [email or web address]    SARS-CoV-2 assay is a Nucleic Acid Amplification assay intended for the  qualitative detection of nucleic acid from SARS-CoV-2 in nasopharyngeal  swabs. Results are for the presumptive identification of SARS-CoV-2 RNA. Positive results are indicative of infection with SARS-CoV-2, the virus  causing COVID-19, but do not rule out bacterial infection or co-infection  with other viruses. Laboratories within the [facility] and its  territories are required to report all positive results to the appropriate  public health authorities. Negative results do not preclude SARS-CoV-2  infection and should not be used as the sole basis for treatment or other  patient management decisions. Negative results must be combined with  clinical observations, patient history, and epidemiological information. This test has not been FDA cleared or approved. This test has been authorized by FDA under an Emergency Use Authorization  (EUA). This test is only authorized for the duration of time the  declaration that circumstances exist justifying the authorization of the  emergency use of an in vitro diagnostic tests for detection of SARS-CoV-2  virus and/or diagnosis of COVID-19 infection under section 564(b)(1) of  the Act, 21 U. S.C. 504OIJ-6(H)(5), unless the authorization is terminated  or revoked sooner. The test has been validated but independent review by FDA  and CLIA is pending. Test performed using [employer] GeneXpert: This RT-PCR assay targets N2,  a region unique to SARS-CoV-2. A conserved region in the E-gene was chosen  for pan-Sarbecovirus detection which includes SARS-CoV-2. According to CMS-2020-01-R, this platform meets the definition of high-throughput technology.     CBC and differential [[id number]]  (Abnormal) Collected: 11/05/23 0137    Lab Status: Final result Specimen: Blood from Arm, Right Updated: 11/05/23 0144     WBC 13.38 Thousand/uL      RBC 4.84 Million/uL      Hemoglobin 13.2 g/dL      Hematocrit 41.1 %      MCV 85 fL      MCH 27.3 pg      MCHC 32.1 g/dL      RDW 14.5 %      MPV 11.0 fL      Platelets 870 Thousands/uL      nRBC 0 /100 WBCs      Neutrophils Relative 78 %      Immat GRANS % 0 %      Lymphocytes Relative 15 %      Monocytes Relative 7 %      Eosinophils Relative 0 %      Basophils Relative 0 %      Neutrophils Absolute 10.25 Thousands/µL      Immature Grans Absolute 0.03 Thousand/uL      Lymphocytes Absolute 2.05 Thousands/µL      Monocytes Absolute 0.99 Thousand/µL      Eosinophils Absolute 0.01 Thousand/µL      Basophils Absolute 0.05 Thousands/µL     Lipase [[id number]]  (Normal) Collected: 11/05/23 0137    Lab Status: Final result Specimen: Blood from Arm, Right Updated: 11/05/23 0126     Lipase 11 u/L     Comprehensive metabolic panel [[id number]]  (Abnormal) Collected: 11/05/23 0137    Lab Status: Final result Specimen: Blood from Arm, Right Updated: 11/05/23 0126     Sodium 137 mmol/L      Potassium 3.9 mmol/L      Chloride 101 mmol/L      CO2 23 mmol/L      ANION GAP 13 mmol/L      BUN 17 mg/dL      Creatinine 0.69 mg/dL      Glucose 114 mg/dL      Calcium 10.1 mg/dL      AST 22 U/L      ALT 13 U/L      Alkaline Phosphatase 60 U/L      Total Protein 8.5 g/dL      Albumin 4.8 g/dL      Total Bilirubin 0.58 mg/dL      eGFR 116 ml/min/1.73sq m     Narrative:      [facility] guidelines for Chronic Kidney Disease (CKD):     Stage 1 with normal or high GFR (GFR > 90 mL/min/1.73 square meters)    Stage 2 Mild CKD (GFR = 60-89 mL/min/1.73 square meters)    Stage 3A Moderate CKD (GFR = 45-59 mL/min/1.73 square meters)    Stage 3B Moderate CKD (GFR = 30-44 mL/min/1.73 square meters)    Stage 4 Severe CKD (GFR = 15-29 mL/min/1.73 square meters)    Stage 5 End Stage CKD (GFR <15 mL/min/1.73 square meters)  Note: GFR calculation is accurate only with a steady state creatinine                   No orders to display              Procedures  Procedures         ED Course  ED Course as of 11/05/23 2152   Sun Nov 05, 2023   0147 WBC(!): 13.38  Non-specific elevation   0201 Bacteria, UA(!): Moderate  No urinary complaints, suspect this is asymptomatic bactereria                                             Medical Decision Making  I reviewed the patient's medical chart, PMHx, prior encounters, medications. My DDx includes: Viral syndrome, gastritis, IBS, cyclical vomiting syndrome. Will check GI labs. Will treat for cyclical vomiting syndrome with zofran, droperidol, will give fluids, re-assess sx. Will hold off on CT scan given non-focal abdomen and hx of recurrent similar episodes with negative workups. Labs were largely unremarkable except a non-specific WBC elevation. Patient had significant improvement in symptoms with medications, fluids. Felt comfortable returning home. I advised decreasing/cessation of marijuana use to help alleviate sx. She understands this. She has zofran at home already. She was discharged with strict return precautions. She will follow up with GI. Amount and/or Complexity of Data Reviewed  Labs: ordered. Decision-making details documented in ED Course. Risk  Prescription drug management.              Disposition  Final diagnoses:   Cyclical vomiting syndrome     Time reflects when diagnosis was documented in both MDM as applicable and the Disposition within this note       Time User Action Codes Description Comment    11/5/2023  2:03 AM Tu Gutierrez Add [X95.71] Cyclical vomiting syndrome           ED Disposition       ED Disposition   Discharge    Condition   Stable    Date/Time   Sun Nov 5, 2023  2:03 AM    Comment   Crissie Soila discharge to home/self care.                    Follow-up Information       Follow up With Specialties Details Why Contact Info Additional [address] [facility],  Internal Medicine Call  For re-evaluation [id number] [facility],[address] [place] [address] [facility] Call  For re-evaluation [phone] [phone]  [address] [facility] [place], [address] [place] [address], [address] [place] [address]            Discharge Medication List as of 11/5/2023  2:04 AM        CONTINUE these medications which have NOT CHANGED    Details   acetaminophen (TYLENOL) 325 mg tablet Take 2 tablets (650 mg total) by mouth every 6 (six) hours as needed for mild pain, headaches or fever, Starting Tue 3/23/2021, No Print      albuterol (ProAir HFA) 90 mcg/act inhaler Inhale 2 puffs every 4 (four) hours as needed for wheezing or shortness of breath (2 puffs eric 4-6 hours around the clock for the next 72 hours and then every 4 hours as needed), Starting Tue 1/4/2022, Normal      Arthritis Pain Relief 650 MG CR tablet TAKE 1 TABLET BY MOUTH EVERY 8 HOURS AS NEEDED FOR MODERATE PAIN, Historical Med      dicyclomine (BENTYL) 20 mg tablet Take 1 tablet (20 mg total) by mouth 2 (two) times a day as needed (abdominal cramping), Starting Wed 7/5/2023, Normal      Multiple Vitamin (multivitamin) capsule Take 1 capsule by mouth daily for 10 days, Starting Tue 1/4/2022, Until Fri 1/14/2022, Normal      ondansetron (Zofran ODT) 4 mg disintegrating tablet Take 1 tablet (4 mg total) by mouth every 8 (eight) hours as needed for nausea or vomiting, Starting Fri 9/29/2023, Normal      Sennosides (Senna) 8.6 MG CAPS Take 8.6 mg by mouth, Starting Mon 3/29/2021, Historical Med             No discharge procedures on file.     PDMP Review       None            ED Provider  Electronically Signed by             Parvin David MD  11/05/23 [id number]

## 2023-11-05 NOTE — DISCHARGE INSTRUCTIONS
Please follow all return precautions. I recommend discontinuing or decreasing marijuana use as this is likely causing recurrent nausea and vomiting and abdominal pain. Thank you.

## 2023-11-08 ENCOUNTER — APPOINTMENT (OUTPATIENT)
Dept: LAB | Facility: CLINIC | Age: 33
End: 2023-11-08
Payer: COMMERCIAL

## 2023-11-08 DIAGNOSIS — R19.4 CHANGE IN BOWEL HABITS: ICD-10-CM

## 2023-11-08 LAB
ALBUMIN SERPL BCP-MCNC: 4.7 G/DL (ref 3.5–5)
ALP SERPL-CCNC: 58 U/L (ref 34–104)
ALT SERPL W P-5'-P-CCNC: 10 U/L (ref 7–52)
ANION GAP SERPL CALCULATED.3IONS-SCNC: 9 MMOL/L
AST SERPL W P-5'-P-CCNC: 13 U/L (ref 13–39)
BASOPHILS # BLD AUTO: 0.1 THOUSANDS/ÂΜL (ref 0–0.1)
BASOPHILS NFR BLD AUTO: 1 % (ref 0–1)
BILIRUB SERPL-MCNC: 0.58 MG/DL (ref 0.2–1)
BUN SERPL-MCNC: 13 MG/DL (ref 5–25)
CALCIUM SERPL-MCNC: 9.8 MG/DL (ref 8.4–10.2)
CHLORIDE SERPL-SCNC: 100 MMOL/L (ref 96–108)
CO2 SERPL-SCNC: 30 MMOL/L (ref 21–32)
CREAT SERPL-MCNC: 0.74 MG/DL (ref 0.6–1.3)
CRP SERPL QL: 4.1 MG/L
EOSINOPHIL # BLD AUTO: 0.06 THOUSAND/ÂΜL (ref 0–0.61)
EOSINOPHIL NFR BLD AUTO: 1 % (ref 0–6)
ERYTHROCYTE [DISTWIDTH] IN BLOOD BY AUTOMATED COUNT: 14.1 % (ref 11.6–15.1)
GFR SERPL CREATININE-BSD FRML MDRD: 108 ML/MIN/1.73SQ M
GLUCOSE P FAST SERPL-MCNC: 95 MG/DL (ref 65–99)
HCT VFR BLD AUTO: 44.2 % (ref 34.8–46.1)
HGB BLD-MCNC: 14 G/DL (ref 11.5–15.4)
IMM GRANULOCYTES # BLD AUTO: 0.03 THOUSAND/UL (ref 0–0.2)
IMM GRANULOCYTES NFR BLD AUTO: 0 % (ref 0–2)
LYMPHOCYTES # BLD AUTO: 2.7 THOUSANDS/ÂΜL (ref 0.6–4.47)
LYMPHOCYTES NFR BLD AUTO: 24 % (ref 14–44)
MCH RBC QN AUTO: 27.4 PG (ref 26.8–34.3)
MCHC RBC AUTO-ENTMCNC: 31.7 G/DL (ref 31.4–37.4)
MCV RBC AUTO: 87 FL (ref 82–98)
MONOCYTES # BLD AUTO: 0.74 THOUSAND/ÂΜL (ref 0.17–1.22)
MONOCYTES NFR BLD AUTO: 6 % (ref 4–12)
NEUTROPHILS # BLD AUTO: 7.86 THOUSANDS/ÂΜL (ref 1.85–7.62)
NEUTS SEG NFR BLD AUTO: 68 % (ref 43–75)
NRBC BLD AUTO-RTO: 0 /100 WBCS
PLATELET # BLD AUTO: 402 THOUSANDS/UL (ref 149–390)
PMV BLD AUTO: 11.1 FL (ref 8.9–12.7)
POTASSIUM SERPL-SCNC: 3.6 MMOL/L (ref 3.5–5.3)
PROT SERPL-MCNC: 8 G/DL (ref 6.4–8.4)
RBC # BLD AUTO: 5.11 MILLION/UL (ref 3.81–5.12)
SODIUM SERPL-SCNC: 139 MMOL/L (ref 135–147)
WBC # BLD AUTO: 11.49 THOUSAND/UL (ref 4.31–10.16)

## 2023-11-08 PROCEDURE — 86140 C-REACTIVE PROTEIN: CPT

## 2023-11-08 PROCEDURE — 80053 COMPREHEN METABOLIC PANEL: CPT

## 2023-11-08 PROCEDURE — 85025 COMPLETE CBC W/AUTO DIFF WBC: CPT

## 2023-11-08 PROCEDURE — 36415 COLL VENOUS BLD VENIPUNCTURE: CPT

## 2023-11-10 ENCOUNTER — OFFICE VISIT (OUTPATIENT)
Dept: GASTROENTEROLOGY | Facility: CLINIC | Age: 33
End: 2023-11-10
Payer: COMMERCIAL

## 2023-11-10 ENCOUNTER — APPOINTMENT (OUTPATIENT)
Dept: LAB | Facility: HOSPITAL | Age: 33
End: 2023-11-10
Payer: COMMERCIAL

## 2023-11-10 VITALS
TEMPERATURE: 97 F | BODY MASS INDEX: 30.12 KG/M2 | HEIGHT: 65 IN | WEIGHT: 180.8 LBS | DIASTOLIC BLOOD PRESSURE: 76 MMHG | RESPIRATION RATE: 16 BRPM | HEART RATE: 63 BPM | OXYGEN SATURATION: 95 % | SYSTOLIC BLOOD PRESSURE: 112 MMHG

## 2023-11-10 DIAGNOSIS — R11.2 NAUSEA AND VOMITING, UNSPECIFIED VOMITING TYPE: Primary | ICD-10-CM

## 2023-11-10 DIAGNOSIS — Z86.010 PERSONAL HISTORY OF COLONIC POLYPS: ICD-10-CM

## 2023-11-10 DIAGNOSIS — R19.4 CHANGE IN BOWEL HABITS: ICD-10-CM

## 2023-11-10 DIAGNOSIS — K52.9 COLITIS: ICD-10-CM

## 2023-11-10 DIAGNOSIS — R19.5 DARK STOOLS: ICD-10-CM

## 2023-11-10 DIAGNOSIS — R10.84 GENERALIZED ABDOMINAL PAIN: ICD-10-CM

## 2023-11-10 PROCEDURE — 99214 OFFICE O/P EST MOD 30 MIN: CPT | Performed by: PHYSICIAN ASSISTANT

## 2023-11-10 PROCEDURE — 87338 HPYLORI STOOL AG IA: CPT

## 2023-11-10 PROCEDURE — 87177 OVA AND PARASITES SMEARS: CPT

## 2023-11-10 PROCEDURE — 87209 SMEAR COMPLEX STAIN: CPT

## 2023-11-10 RX ORDER — FAMOTIDINE 40 MG/1
40 TABLET, FILM COATED ORAL DAILY
Qty: 30 TABLET | Refills: 5 | Status: SHIPPED | OUTPATIENT
Start: 2023-11-10 | End: 2023-11-16

## 2023-11-10 NOTE — PATIENT INSTRUCTIONS
I am not sure what triggered your most recent bout of symptoms. It may have been some irritable bowel syndrome, a reaction to what you ate, a little bit of an infectious bug, a motility or movement issue with your gut, etc.  However, given this is continuing to happen, giving your stool was looking very dark, and given the symptoms have resulted in ER evaluation multiple times, I think at this point it is reasonable to check an updated upper endoscopy and look into your stomach. For now, start on the famotidine/Pepcid to help with stomach inflammation every day. For abdominal cramping, we are going to swap out the dicyclomine/Bentyl for Levsin/hyoscyamine, this 1 dissolve so it may be easier to tolerate when your symptoms are severe. Use the Zofran as needed for nausea. Continue with small frequent meals. Trial cannabis cessation for 6 to 8 weeks. Drop off the stool studies and we will have additional recommendations based on the results.

## 2023-11-10 NOTE — PROGRESS NOTES
[facility] - Outpatient Follow-up Note  Minoo Monzon 34 y.o. female MRN: [id number]  Encounter: [id number]    ASSESSMENT AND PLAN:      1. Nausea and vomiting, unspecified vomiting type  2. Generalized abdominal pain  3. Dark stools    Pt with recurrence of n/v/d which resulted in ER evaluation in 11/2023. Serologic investigation was wnl with the exception of a modest leukocytosis. Ddx for symptoms includes infectious gastroenteritis, cannabis hyperemesis, cyclical vomiting syndrome, biliary pathology (though historically imaging has been wnl), IBS, IBD etc. Pt notes symptoms begin with abd cramping, then subsequently develops n/v. We did review last EGD in 2021 was macroscopically unremarkable. However, given recurrent symptoms resulting in ER evaluation as well as endorsement of "dark stools", recommend EGD now to evaluate for intraluminal pathology. Recommend trial H2RA daily now. Okay for PRN usage of anti-emetic. Encourage cannabis cessation for at least 6-8 weeks. Trial alt anti-spasmodic for symptom relief. Risks associated with endoscopic evaluation discussed with patient today, including but not limited to bleeding, infection, perforation, and organ injury, all of which are low. Pt demonstrates understanding and is agreeable to the plan. If w/u unremarkable and symptoms persist, consider GES +/- HIDA in future. - hyoscyamine (LEVSIN/SL) 0.125 mg SL tablet; Take 1 tablet (0.125 mg total) by mouth every 6 (six) hours as needed for cramping (abd cramping)  Dispense: 60 tablet; Refill: 5  - famotidine (PEPCID) 40 MG tablet; Take 1 tablet (40 mg total) by mouth daily  Dispense: 30 tablet; Refill: 5  - EGD; Future    4. Colitis    History of such earlier this year, with fecal calprotectin at 104. Treated with abx. Subsequent colonoscopy with left-sided healing colitis and biopsies were unremarkable for evidence of acute or chronic colitis.      It was initially postulated that pt's symptoms were 2/2 to self-limited infectious process. However, given recurrence of diarrhea, we will check stool testing for chronic infection as well as repeat fecal calprotectin now. Consider symptoms may be secondary to irritable bowel syndrome. Can look into low FODMAP diet, utilize anti-spasmodic PRN. 5. Personal history of colonic polyps    Adenomatous polyp removed during her colonoscopy in 06/2023, repeat in 5 years for surveillance purposes. We will follow up after endoscopic evaluation. ______________________________________________________________________    SUBJECTIVE: Patient is a 34 y.o. female who presents today for follow-up regarding ER evaluation for n/v/d. Pmhx sig for chronic nausea, tobacco use disorder, cannabis use. Pt was last evaluated in 09/2023 in the clinic. At that time, she was feeling well overall with rare breakthrough symptoms of n/v, which would typically occur after onset of abdominal pain. Anti-emetics were working well for her on a PRN basis. She unfortunately developed intractable symptoms within the past week and presented to ER on 11/05/23 with symptoms. She was intolerant of any PO at that time. D/c in stable condition. 11/10/23:     Pt is here today with her mother. Thinks perhaps most recent bout of symptoms was brought on by Kelsie aguilar. No obvious exposure to sick contacts, change in medications, supplements, travel, abx usage etc.     No recurrence of emesis since ER evaluation, though still having nausea and excess belching at times. Denies heartburn or acid reflux symptoms. No dysphagia or odynophagia. Has lost a few lbs in the acute setting which she attributes to PO intolerance. She notes that the constellation of symptoms typically starts with abdominal pain, then she subsequently develops nausea and non-bloody emesis. Notes she develops burning pain in her abdomen, lower abdomen develops a sensation of swelling.      She notes that during this episode, she also noted some diarrhea, which was very dark in color. Was not taking any iron or pepto-bismol at that time. Now back to brown. At times stool appears to have mucus in it. No BRBPR. No constipation. 04/2023: CT A/P: Diffuse colonic wall thickening and minimal inflammatory change raising suspicion for nonspecific infectious or inflammatory colitis, uncomplicated. 04/2023: RUQ US: mild hepatic steatosis   04/2023: fecal calprotectin 104, celiac disease panel negative, CRP 5.5, WBC 12.62, Hb 13.1, MCV 87, Plt 434, BUN 13, Cr 0.67, AST 15, ALT 10, ALP 63, albumin 4.5, t bili 0.35, lipase 8  11/2023: Hb 14.0, MCV 87, Plt 402, BUN 13, Cr 0.74, AST 13, ALT 10, ALP 58, albumin 4.7, t bili 0.58, lipase 11     Endoscopic history:   EGD: 03/2021: normal   A. Stomach, gastric bx r/o h. pylori cold forcep: Antral and oxyntic mucosa with chronic inactive gastritis. No Helicobacter pylori organisms are identified on the routine stain. No intestinal metaplasia, dysplasia or malignancy is identified  Colon: 06/2023: Normal terminal ileum; 4 mm sessile polyp cecum; mm polyp at 20 cm; Mild patchy erythema without erosion between 20 and 30 cm;  Very mild erythema with small erosion in rectum   A. Large Intestine, Cecum, 4MM cecal polyp: Tubular adenoma. Negative for high-grade dysplasia and malignancy. B. Rectum, patchy mucosa: Colorectal mucosa with single granuloma associated with a lymphoid aggregate. No significant architectural changes or acute inflammation. Negative for dysplasia and malignancy. C. Large Intestine, Sigmoid Colon, polyp @ 20:Hyperplastic polyp. Negative for dysplasia and malignancy. D. Rectum, r/o proctitis: Colorectal mucosa with few granulomas associated with a lymphoid aggregate. No significant architectural changes or acute inflammation. Negative for dysplasia and malignancy.     E. Rectum, polyp: Polypoid colonic mucosa with surface hyperplastic changes and associated lymphoid aggregate. Negative for dysplasia and malignancy    Review of Systems   Constitutional:  Negative for fever. Gastrointestinal:  Positive for abdominal pain, diarrhea, nausea and vomiting. Negative for constipation. Genitourinary:  Negative for dysuria, frequency and hematuria. Musculoskeletal:  Positive for myalgias. Negative for arthralgias. Neurological:  Positive for headaches. Otherwise Per HPI    Historical Information   Past Medical History:   Diagnosis Date    Asthma      Past Surgical History:   Procedure Laterality Date    CYST REMOVAL      SHOULDER SURGERY      R shoulder    TONSILLECTOMY       Social History   Social History     Substance and Sexual Activity   Alcohol Use Not Currently    Comment: socailly     Social History     Substance and Sexual Activity   Drug Use Yes    Types: Marijuana     Social History     Tobacco Use   Smoking Status Every Day    Packs/day: 1.00    Types: Cigarettes   Smokeless Tobacco Never     History reviewed. No pertinent family history. Meds/Allergies       Current Outpatient Medications:     acetaminophen (TYLENOL) 325 mg tablet    albuterol (ProAir HFA) 90 mcg/act inhaler    Arthritis Pain Relief 650 MG CR tablet    dicyclomine (BENTYL) 20 mg tablet    ondansetron (Zofran ODT) 4 mg disintegrating tablet    Sennosides (Senna) 8.6 MG CAPS    Multiple Vitamin (multivitamin) capsule    No Known Allergies    Objective     Blood pressure 112/76, pulse 63, temperature (!) 97 °F (36.1 °C), temperature source Tympanic, resp. rate 16, height 5' 5" (1.651 m), weight 82 kg (180 lb 12.8 oz), SpO2 95 %. Body mass index is 30.09 kg/m². Physical Exam  Vitals and nursing note reviewed. Constitutional:       Appearance: Normal appearance. HENT:      Head: Normocephalic and atraumatic. Eyes:      General: No scleral icterus. Conjunctiva/sclera: Conjunctivae normal.   Cardiovascular:      Rate and Rhythm: Normal rate.    Pulmonary:      Effort: Pulmonary effort is normal. No respiratory distress. Abdominal:      General: Bowel sounds are normal. There is no distension. Palpations: Abdomen is soft. Tenderness: There is no abdominal tenderness. There is no guarding or rebound. Skin:     General: Skin is warm and dry. Neurological:      General: No focal deficit present. Mental Status: She is alert and oriented to person, place, and time. Psychiatric:         Mood and Affect: Mood normal.         Behavior: Behavior normal.       Lab Results:   No visits with results within 1 Day(s) from this visit.    Latest known visit with results is:   Appointment on 11/08/2023   Component Date Value    WBC 11/08/2023 11.49 (H)     RBC 11/08/2023 5.11     Hemoglobin 11/08/2023 14.0     Hematocrit 11/08/2023 44.2     MCV 11/08/2023 87     MCH 11/08/2023 27.4     MCHC 11/08/2023 31.7     RDW 11/08/2023 14.1     MPV 11/08/2023 11.1     Platelets 78/06/5470 402 (H)     nRBC 11/08/2023 0     Neutrophils Relative 11/08/2023 68     Immat GRANS % 11/08/2023 0     Lymphocytes Relative 11/08/2023 24     Monocytes Relative 11/08/2023 6     Eosinophils Relative 11/08/2023 1     Basophils Relative 11/08/2023 1     Neutrophils Absolute 11/08/2023 7.86 (H)     Immature Grans Absolute 11/08/2023 0.03     Lymphocytes Absolute 11/08/2023 2.70     Monocytes Absolute 11/08/2023 0.74     Eosinophils Absolute 11/08/2023 0.06     Basophils Absolute 11/08/2023 0.10     CRP 11/08/2023 4.1 (H)     Sodium 11/08/2023 139     Potassium 11/08/2023 3.6     Chloride 11/08/2023 100     CO2 11/08/2023 30     ANION GAP 11/08/2023 9     BUN 11/08/2023 13     Creatinine 11/08/2023 0.74     Glucose, Fasting 11/08/2023 95     Calcium 11/08/2023 9.8     AST 11/08/2023 13     ALT 11/08/2023 10     Alkaline Phosphatase 11/08/2023 58     Total Protein 11/08/2023 8.0     Albumin 11/08/2023 4.7     Total Bilirubin 11/08/2023 0.58     eGFR 11/08/2023 108      Radiology Results:   No results found. EDGAR ArzateC    **Please note:  Dictation voice to text software may have been used in the creation of this record. Occasional wrong word or “sound alike” substitutions may have occurred due to the inherent limitations of voice recognition software. Read the chart carefully and recognize, using context, where substitutions have occurred. **

## 2023-11-11 LAB — H PYLORI AG STL QL IA: POSITIVE

## 2023-11-13 ENCOUNTER — APPOINTMENT (EMERGENCY)
Dept: CT IMAGING | Facility: HOSPITAL | Age: 33
DRG: 249 | End: 2023-11-13
Payer: COMMERCIAL

## 2023-11-13 ENCOUNTER — HOSPITAL ENCOUNTER (INPATIENT)
Facility: HOSPITAL | Age: 33
LOS: 2 days | Discharge: HOME | DRG: 249 | End: 2023-11-16
Attending: INTERNAL MEDICINE | Admitting: INTERNAL MEDICINE
Payer: COMMERCIAL

## 2023-11-13 DIAGNOSIS — R10.9 INTRACTABLE ABDOMINAL PAIN: ICD-10-CM

## 2023-11-13 DIAGNOSIS — R10.9 ABDOMINAL PAIN: ICD-10-CM

## 2023-11-13 DIAGNOSIS — R10.13 EPIGASTRIC PAIN: ICD-10-CM

## 2023-11-13 DIAGNOSIS — R11.2 INTRACTABLE NAUSEA AND VOMITING: ICD-10-CM

## 2023-11-13 DIAGNOSIS — A04.8 POSITIVE H. PYLORI TEST: ICD-10-CM

## 2023-11-13 DIAGNOSIS — U07.1 COVID-19: ICD-10-CM

## 2023-11-13 DIAGNOSIS — J45.30 MILD PERSISTENT ASTHMA WITHOUT COMPLICATION: ICD-10-CM

## 2023-11-13 DIAGNOSIS — R11.10 VOMITING: ICD-10-CM

## 2023-11-13 DIAGNOSIS — N39.0 UTI (URINARY TRACT INFECTION): Primary | ICD-10-CM

## 2023-11-13 PROBLEM — R07.9 CHEST PAIN: Status: RESOLVED | Noted: 2021-03-22 | Resolved: 2023-11-13

## 2023-11-13 PROBLEM — N30.00 ACUTE CYSTITIS WITHOUT HEMATURIA: Status: ACTIVE | Noted: 2023-11-13

## 2023-11-13 PROBLEM — R00.1 BRADYCARDIA: Status: RESOLVED | Noted: 2021-03-23 | Resolved: 2023-11-13

## 2023-11-13 PROBLEM — Z76.5 DRUG-SEEKING BEHAVIOR: Status: RESOLVED | Noted: 2022-01-04 | Resolved: 2023-11-13

## 2023-11-13 PROBLEM — M54.9 BACK PAIN: Status: RESOLVED | Noted: 2018-08-20 | Resolved: 2023-11-13

## 2023-11-13 PROBLEM — S42.031D CLOSED DISPLACED FRACTURE OF ACROMIAL END OF RIGHT CLAVICLE WITH ROUTINE HEALING: Status: RESOLVED | Noted: 2018-08-14 | Resolved: 2023-11-13

## 2023-11-13 PROBLEM — K76.0 HEPATIC STEATOSIS: Status: ACTIVE | Noted: 2023-11-13

## 2023-11-13 PROBLEM — E87.6 HYPOKALEMIA: Status: RESOLVED | Noted: 2021-03-21 | Resolved: 2023-11-13

## 2023-11-13 LAB
ALBUMIN SERPL BCP-MCNC: 4.6 G/DL (ref 3.5–5)
ALP SERPL-CCNC: 51 U/L (ref 34–104)
ALT SERPL W P-5'-P-CCNC: 9 U/L (ref 7–52)
AMPHETAMINES SERPL QL SCN: NEGATIVE
ANION GAP SERPL CALCULATED.3IONS-SCNC: 13 MMOL/L
AST SERPL W P-5'-P-CCNC: 21 U/L (ref 13–39)
BACTERIA UR QL AUTO: ABNORMAL /HPF
BARBITURATES UR QL: NEGATIVE
BASE EX.OXY STD BLDV CALC-SCNC: 72.6 % (ref 60–80)
BASE EXCESS BLDV CALC-SCNC: 1.3 MMOL/L
BASOPHILS # BLD AUTO: 0.09 THOUSANDS/ÂΜL (ref 0–0.1)
BASOPHILS NFR BLD AUTO: 1 % (ref 0–1)
BENZODIAZ UR QL: NEGATIVE
BILIRUB SERPL-MCNC: 0.51 MG/DL (ref 0.2–1)
BILIRUB UR QL STRIP: ABNORMAL
BUN SERPL-MCNC: 13 MG/DL (ref 5–25)
CALCIUM SERPL-MCNC: 9.8 MG/DL (ref 8.4–10.2)
CHLORIDE SERPL-SCNC: 98 MMOL/L (ref 96–108)
CLARITY UR: CLEAR
CO2 SERPL-SCNC: 25 MMOL/L (ref 21–32)
COCAINE UR QL: NEGATIVE
COLOR UR: YELLOW
CREAT SERPL-MCNC: 0.69 MG/DL (ref 0.6–1.3)
EOSINOPHIL # BLD AUTO: 0.05 THOUSAND/ÂΜL (ref 0–0.61)
EOSINOPHIL NFR BLD AUTO: 0 % (ref 0–6)
ERYTHROCYTE [DISTWIDTH] IN BLOOD BY AUTOMATED COUNT: 14.1 % (ref 11.6–15.1)
EXT PREGNANCY TEST URINE: NEGATIVE
EXT. CONTROL: NORMAL
FLUAV RNA RESP QL NAA+PROBE: NEGATIVE
FLUBV RNA RESP QL NAA+PROBE: NEGATIVE
GFR SERPL CREATININE-BSD FRML MDRD: 116 ML/MIN/1.73SQ M
GLUCOSE SERPL-MCNC: 95 MG/DL (ref 65–140)
GLUCOSE UR STRIP-MCNC: NEGATIVE MG/DL
HCO3 BLDV-SCNC: 24.8 MMOL/L (ref 24–30)
HCT VFR BLD AUTO: 42.3 % (ref 34.8–46.1)
HGB BLD-MCNC: 13.7 G/DL (ref 11.5–15.4)
HGB UR QL STRIP.AUTO: NEGATIVE
IMM GRANULOCYTES # BLD AUTO: 0.05 THOUSAND/UL (ref 0–0.2)
IMM GRANULOCYTES NFR BLD AUTO: 0 % (ref 0–2)
KETONES UR STRIP-MCNC: ABNORMAL MG/DL
LACTATE SERPL-SCNC: 0.9 MMOL/L (ref 0.5–2)
LEUKOCYTE ESTERASE UR QL STRIP: ABNORMAL
LIPASE SERPL-CCNC: 26 U/L (ref 11–82)
LYMPHOCYTES # BLD AUTO: 1.83 THOUSANDS/ÂΜL (ref 0.6–4.47)
LYMPHOCYTES NFR BLD AUTO: 13 % (ref 14–44)
MCH RBC QN AUTO: 27.8 PG (ref 26.8–34.3)
MCHC RBC AUTO-ENTMCNC: 32.4 G/DL (ref 31.4–37.4)
MCV RBC AUTO: 86 FL (ref 82–98)
METHADONE UR QL: NEGATIVE
MONOCYTES # BLD AUTO: 0.74 THOUSAND/ÂΜL (ref 0.17–1.22)
MONOCYTES NFR BLD AUTO: 5 % (ref 4–12)
MUCOUS THREADS UR QL AUTO: ABNORMAL
NEUTROPHILS # BLD AUTO: 11.13 THOUSANDS/ÂΜL (ref 1.85–7.62)
NEUTS SEG NFR BLD AUTO: 81 % (ref 43–75)
NITRITE UR QL STRIP: NEGATIVE
NON-SQ EPI CELLS URNS QL MICRO: ABNORMAL /HPF
NRBC BLD AUTO-RTO: 0 /100 WBCS
O2 CT BLDV-SCNC: 14.7 ML/DL
OPIATES UR QL SCN: NEGATIVE
OTHER STN SPEC: ABNORMAL
OXYCODONE+OXYMORPHONE UR QL SCN: NEGATIVE
PCO2 BLDV: 35.7 MM HG (ref 42–50)
PCP UR QL: NEGATIVE
PH BLDV: 7.46 [PH] (ref 7.3–7.4)
PH UR STRIP.AUTO: 6.5 [PH]
PLATELET # BLD AUTO: 353 THOUSANDS/UL (ref 149–390)
PMV BLD AUTO: 10.1 FL (ref 8.9–12.7)
PO2 BLDV: 37.1 MM HG (ref 35–45)
POTASSIUM SERPL-SCNC: 4 MMOL/L (ref 3.5–5.3)
PROT SERPL-MCNC: 7.9 G/DL (ref 6.4–8.4)
PROT UR STRIP-MCNC: ABNORMAL MG/DL
RBC # BLD AUTO: 4.93 MILLION/UL (ref 3.81–5.12)
RBC #/AREA URNS AUTO: ABNORMAL /HPF
RSV RNA RESP QL NAA+PROBE: NEGATIVE
SARS-COV-2 RNA RESP QL NAA+PROBE: NEGATIVE
SODIUM SERPL-SCNC: 136 MMOL/L (ref 135–147)
SP GR UR STRIP.AUTO: >=1.03 (ref 1–1.03)
THC UR QL: POSITIVE
UROBILINOGEN UR QL STRIP.AUTO: 0.2 E.U./DL
WBC # BLD AUTO: 13.89 THOUSAND/UL (ref 4.31–10.16)
WBC #/AREA URNS AUTO: ABNORMAL /HPF
WBC CLUMPS # UR AUTO: ABNORMAL /UL

## 2023-11-13 PROCEDURE — 96376 TX/PRO/DX INJ SAME DRUG ADON: CPT

## 2023-11-13 PROCEDURE — 99284 EMERGENCY DEPT VISIT MOD MDM: CPT

## 2023-11-13 PROCEDURE — 99285 EMERGENCY DEPT VISIT HI MDM: CPT | Performed by: PHYSICIAN ASSISTANT

## 2023-11-13 PROCEDURE — 82805 BLOOD GASES W/O2 SATURATION: CPT | Performed by: PHYSICIAN ASSISTANT

## 2023-11-13 PROCEDURE — 99223 1ST HOSP IP/OBS HIGH 75: CPT | Performed by: INTERNAL MEDICINE

## 2023-11-13 PROCEDURE — 96365 THER/PROPH/DIAG IV INF INIT: CPT

## 2023-11-13 PROCEDURE — 83690 ASSAY OF LIPASE: CPT | Performed by: PHYSICIAN ASSISTANT

## 2023-11-13 PROCEDURE — 36415 COLL VENOUS BLD VENIPUNCTURE: CPT | Performed by: PHYSICIAN ASSISTANT

## 2023-11-13 PROCEDURE — 81025 URINE PREGNANCY TEST: CPT | Performed by: PHYSICIAN ASSISTANT

## 2023-11-13 PROCEDURE — 74177 CT ABD & PELVIS W/CONTRAST: CPT

## 2023-11-13 PROCEDURE — 96375 TX/PRO/DX INJ NEW DRUG ADDON: CPT

## 2023-11-13 PROCEDURE — 80053 COMPREHEN METABOLIC PANEL: CPT | Performed by: PHYSICIAN ASSISTANT

## 2023-11-13 PROCEDURE — 96366 THER/PROPH/DIAG IV INF ADDON: CPT

## 2023-11-13 PROCEDURE — 81001 URINALYSIS AUTO W/SCOPE: CPT | Performed by: PHYSICIAN ASSISTANT

## 2023-11-13 PROCEDURE — 0241U HB NFCT DS VIR RESP RNA 4 TRGT: CPT | Performed by: PHYSICIAN ASSISTANT

## 2023-11-13 PROCEDURE — 87086 URINE CULTURE/COLONY COUNT: CPT | Performed by: STUDENT IN AN ORGANIZED HEALTH CARE EDUCATION/TRAINING PROGRAM

## 2023-11-13 PROCEDURE — G1004 CDSM NDSC: HCPCS

## 2023-11-13 PROCEDURE — 85025 COMPLETE CBC W/AUTO DIFF WBC: CPT | Performed by: PHYSICIAN ASSISTANT

## 2023-11-13 PROCEDURE — 80307 DRUG TEST PRSMV CHEM ANLYZR: CPT | Performed by: PHYSICIAN ASSISTANT

## 2023-11-13 PROCEDURE — 83605 ASSAY OF LACTIC ACID: CPT | Performed by: PHYSICIAN ASSISTANT

## 2023-11-13 PROCEDURE — 96368 THER/DIAG CONCURRENT INF: CPT

## 2023-11-13 RX ORDER — NICOTINE 21 MG/24HR
1 PATCH, TRANSDERMAL 24 HOURS TRANSDERMAL DAILY
Status: DISCONTINUED | OUTPATIENT
Start: 2023-11-14 | End: 2023-11-13

## 2023-11-13 RX ORDER — ALBUTEROL SULFATE 90 UG/1
2 AEROSOL, METERED RESPIRATORY (INHALATION) EVERY 4 HOURS PRN
Status: DISCONTINUED | OUTPATIENT
Start: 2023-11-13 | End: 2023-11-16 | Stop reason: HOSPADM

## 2023-11-13 RX ORDER — ONDANSETRON 2 MG/ML
4 INJECTION INTRAMUSCULAR; INTRAVENOUS ONCE
Status: COMPLETED | OUTPATIENT
Start: 2023-11-13 | End: 2023-11-13

## 2023-11-13 RX ORDER — PANTOPRAZOLE SODIUM 40 MG/10ML
40 INJECTION, POWDER, LYOPHILIZED, FOR SOLUTION INTRAVENOUS EVERY 12 HOURS SCHEDULED
Status: DISCONTINUED | OUTPATIENT
Start: 2023-11-13 | End: 2023-11-13

## 2023-11-13 RX ORDER — ONDANSETRON 2 MG/ML
4 INJECTION INTRAMUSCULAR; INTRAVENOUS EVERY 6 HOURS PRN
Status: DISCONTINUED | OUTPATIENT
Start: 2023-11-13 | End: 2023-11-14

## 2023-11-13 RX ORDER — CEFTRIAXONE 1 G/50ML
1000 INJECTION, SOLUTION INTRAVENOUS EVERY 24 HOURS
Status: DISCONTINUED | OUTPATIENT
Start: 2023-11-14 | End: 2023-11-14

## 2023-11-13 RX ORDER — SODIUM CHLORIDE, SODIUM GLUCONATE, SODIUM ACETATE, POTASSIUM CHLORIDE, MAGNESIUM CHLORIDE, SODIUM PHOSPHATE, DIBASIC, AND POTASSIUM PHOSPHATE .53; .5; .37; .037; .03; .012; .00082 G/100ML; G/100ML; G/100ML; G/100ML; G/100ML; G/100ML; G/100ML
2000 INJECTION, SOLUTION INTRAVENOUS ONCE
Status: COMPLETED | OUTPATIENT
Start: 2023-11-13 | End: 2023-11-13

## 2023-11-13 RX ORDER — CEFTRIAXONE 1 G/50ML
1000 INJECTION, SOLUTION INTRAVENOUS ONCE
Status: COMPLETED | OUTPATIENT
Start: 2023-11-13 | End: 2023-11-13

## 2023-11-13 RX ORDER — NICOTINE 21 MG/24HR
1 PATCH, TRANSDERMAL 24 HOURS TRANSDERMAL DAILY
Status: DISCONTINUED | OUTPATIENT
Start: 2023-11-13 | End: 2023-11-16 | Stop reason: HOSPADM

## 2023-11-13 RX ORDER — ACETAMINOPHEN 325 MG/1
650 TABLET ORAL EVERY 6 HOURS PRN
Status: DISCONTINUED | OUTPATIENT
Start: 2023-11-13 | End: 2023-11-14

## 2023-11-13 RX ORDER — SODIUM CHLORIDE 9 MG/ML
125 INJECTION, SOLUTION INTRAVENOUS CONTINUOUS
Status: DISCONTINUED | OUTPATIENT
Start: 2023-11-13 | End: 2023-11-14

## 2023-11-13 RX ORDER — HYDROMORPHONE HCL/PF 1 MG/ML
1 SYRINGE (ML) INJECTION ONCE
Status: COMPLETED | OUTPATIENT
Start: 2023-11-13 | End: 2023-11-13

## 2023-11-13 RX ADMIN — HYDROMORPHONE HYDROCHLORIDE 1 MG: 1 INJECTION, SOLUTION INTRAMUSCULAR; INTRAVENOUS; SUBCUTANEOUS at 16:10

## 2023-11-13 RX ADMIN — ONDANSETRON 4 MG: 2 INJECTION INTRAMUSCULAR; INTRAVENOUS at 14:04

## 2023-11-13 RX ADMIN — ONDANSETRON 4 MG: 2 INJECTION INTRAMUSCULAR; INTRAVENOUS at 23:31

## 2023-11-13 RX ADMIN — FLUTICASONE FUROATE 1 PUFF: 100 POWDER RESPIRATORY (INHALATION) at 18:34

## 2023-11-13 RX ADMIN — NICOTINE 1 PATCH: 21 PATCH, EXTENDED RELEASE TRANSDERMAL at 19:46

## 2023-11-13 RX ADMIN — ONDANSETRON 4 MG: 2 INJECTION INTRAMUSCULAR; INTRAVENOUS at 16:10

## 2023-11-13 RX ADMIN — SODIUM CHLORIDE 125 ML/HR: 0.9 INJECTION, SOLUTION INTRAVENOUS at 17:19

## 2023-11-13 RX ADMIN — CEFTRIAXONE 1000 MG: 1 INJECTION, SOLUTION INTRAVENOUS at 15:09

## 2023-11-13 RX ADMIN — SODIUM CHLORIDE, SODIUM GLUCONATE, SODIUM ACETATE, POTASSIUM CHLORIDE, MAGNESIUM CHLORIDE, SODIUM PHOSPHATE, DIBASIC, AND POTASSIUM PHOSPHATE 2000 ML: .53; .5; .37; .037; .03; .012; .00082 INJECTION, SOLUTION INTRAVENOUS at 14:04

## 2023-11-13 RX ADMIN — IOHEXOL 100 ML: 350 INJECTION, SOLUTION INTRAVENOUS at 14:37

## 2023-11-13 NOTE — H&P
[address]  H&P  Name: Chapo Frias 34 y.o. female I MRN: [id number]  Unit/Bed#: 425-01 I Date of Admission: 2023   Date of Service: 2023 I Hospital Day: 0      Assessment/Plan   * Intractable nausea and vomiting  Assessment & Plan  -H pylori antigen, stool test on 11/10 --> tested positive  --> pending GI input on treatment of H pylori after endoscopy  -re-ordered outpatient stool test for her current hospital stay to help clarify diagnosis - f/u Giardian antigen, stool enteric bacterial panel PCR, fecal calprotectin   -ova parasite - pending 11/10 lab result  -CRP 4.1 on 23  -celiac disease antibody negative on 23    -consulted gastroenterology  -plan for endoscopy tomorrow on   -IVF at 125cc/hr to prevent dehydration  -keep NPO  -did not start IV pantoprazole due to endoscopy tomorrow, possibly can affect H pylori result  -IV zofran PRN  -tylenol for pain PRN    Positive H. pylori test  Assessment & Plan  -pending GI input as above    Hepatic steatosis  Assessment & Plan  -confirmed on 2023 RUQ ultrasound, again seen on CT A/P on admission  -dietary modification    Mild persistent asthma without complication  Assessment & Plan  -symptoms not controlled on albuterol inhaler PRN at home, wakes up at night 2+ per week, uses albuterol multiple times per week    -to be discharged on ICS inhaler for maintenance (new medication)  -needs albuterol inhaler at discharge, reports this medication has   -reports to have spirometry in the past for diaignosis      Acute cystitis without hematuria  Assessment & Plan  -IV ceftriaxone ( - ), plan to transition to PO antibiotics once able to tolerate PO intake  -f/u urine culture  -not likely to explain her vomiting for the past 2 weeks    Leukocytosis  Assessment & Plan  -recommend outpatient hematology evaluation for her elevated WBC, seen in past 2 years           VTE Prophylaxis: None - no risk factors  / sequential compression device   Code Status: full code  POLST: POLST is not applicable to this patient  Discussion with family: mother    Anticipated Length of Stay:  Patient will be admitted on an Observation basis with an anticipated length of stay of  less than 2 midnights. Justification for Hospital Stay: vomiting, pending endoscopy    Total Time for Visit, including Counseling / Coordination of Care: 60 minutes. Greater than 50% of this total time spent on direct patient counseling and coordination of care. Chief Complaint:   vomiting    History of Present Illness:    34 y.o. female PMH asthma, colitis (resolved), multiple bouts of nausea and vomiting since 2021, presents on 11/13 with multiple episodes of vomiting. Two weeks ago, she had [employer] pizza and smoked a cannabis joint, then that evening she started vomiting. She has been vomiting 10+ daily for the past 2 weeks, not able to tolerate food, able to drink water. The vomitus is pure white to yellow color. Denies having blood in the vomit. She also has diarrhea for 2 weeks, describe them as very dark and black for first few days, now they are brown with mucus and slimy consistency. She reports 10 lb weight loss due to poor appetite. No constipation. No urinary symptom. No frequency, dysuria, lower abdominal pain. At home, she did not find zofran to be helpful, has some relief from hyoscyamin. She has been taking famotidine 40mg daily. She reports to me that her last cannabis use was 2 weeks ago. She currently smokes 1 pack cigarette per day, drinks multiple cans of [employer] energy drink daily, and does not use alcohol. Review of Systems:    Review of Systems   Constitutional:  Positive for appetite change, fatigue and unexpected weight change. Negative for activity change and fever. HENT:  Negative for hearing loss and sore throat. Eyes:  Negative for discharge and visual disturbance.    Respiratory:  Negative for cough and shortness of breath. Cardiovascular:  Negative for chest pain and palpitations. Gastrointestinal:  Positive for abdominal pain, diarrhea and vomiting. Negative for blood in stool, constipation and nausea. Genitourinary:  Negative for difficulty urinating, dysuria, frequency and hematuria. Musculoskeletal:  Negative for arthralgias and back pain. Skin:  Negative for color change and rash. Neurological:  Positive for light-headedness. Negative for dizziness and syncope. Past Medical and Surgical History:     Past Medical History:   Diagnosis Date    Asthma     Closed displaced fracture of acromial end of right clavicle with routine healing 08/14/2018       Past Surgical History:   Procedure Laterality Date    CYST REMOVAL      SHOULDER SURGERY      R shoulder    TONSILLECTOMY         Meds/Allergies:    Prior to Admission medications    Medication Sig Start Date End Date Taking?  Authorizing Provider   acetaminophen (TYLENOL) 325 mg tablet Take 2 tablets (650 mg total) by mouth every 6 (six) hours as needed for mild pain, headaches or fever 3/23/21  Yes Rosanna Gonzalez DO   famotidine (PEPCID) 40 MG tablet Take 1 tablet (40 mg total) by mouth daily 11/10/23  Yes Shakeel Price PA-C   hyoscyamine (LEVSIN/SL) 0.125 mg SL tablet Take 1 tablet (0.125 mg total) by mouth every 6 (six) hours as needed for cramping (abd cramping) 11/10/23  Yes Shakeel Price PA-C   ondansetron (Zofran ODT) 4 mg disintegrating tablet Take 1 tablet (4 mg total) by mouth every 8 (eight) hours as needed for nausea or vomiting 9/29/23  Yes Shakeel Price PA-C   albuterol (ProAir HFA) 90 mcg/act inhaler Inhale 2 puffs every 4 (four) hours as needed for wheezing or shortness of breath (2 puffs eric 4-6 hours around the clock for the next 72 hours and then every 4 hours as needed) 1/4/22   Jo-Ann Velasco MD   Arthritis Pain Relief 650 MG CR tablet TAKE 1 TABLET BY MOUTH EVERY 8 HOURS AS NEEDED FOR MODERATE PAIN  Patient not taking: Reported on 11/13/2023 3/29/21   Historical Provider, MD   Multiple Vitamin (multivitamin) capsule Take 1 capsule by mouth daily for 10 days 1/4/22 1/14/22  Felix Herrera MD     I have reviewed home medications with patient personally. Allergies: No Known Allergies    Social History:     Marital Status: Single   Occupation: not relevant  Patient Pre-hospital Living Situation: home  Patient Pre-hospital Level of Mobility: independent  Patient Pre-hospital Diet Restrictions: none  Substance Use History:   Social History     Substance and Sexual Activity   Alcohol Use Not Currently    Comment: socailly     Social History     Tobacco Use   Smoking Status Every Day    Packs/day: 0.25    Types: Cigarettes   Smokeless Tobacco Never     Social History     Substance and Sexual Activity   Drug Use Yes    Types: Marijuana       Family History:    non-contributory    Physical Exam:     Vitals:   Blood Pressure: 112/64 (11/13/23 1633)  Pulse: 73 (11/13/23 1633)  Temperature: 98.6 °F (37 °C) (11/13/23 1633)  Temp Source: Temporal (11/13/23 1342)  Respirations: 18 (11/13/23 1633)  Weight - Scale: 81.6 kg (180 lb) (11/13/23 1342)  SpO2: 98 % (11/13/23 1633)    Physical Exam  Vitals and nursing note reviewed. Constitutional:       General: She is not in acute distress. Appearance: She is well-developed. HENT:      Head: Normocephalic and atraumatic. Nose: Nose normal. No congestion. Mouth/Throat:      Mouth: Mucous membranes are moist.      Pharynx: Oropharynx is clear. Eyes:      Extraocular Movements: Extraocular movements intact. Conjunctiva/sclera: Conjunctivae normal.   Cardiovascular:      Rate and Rhythm: Normal rate and regular rhythm. Pulses: Normal pulses. Heart sounds: Normal heart sounds. No murmur heard. Pulmonary:      Effort: Pulmonary effort is normal. No respiratory distress. Breath sounds: Normal breath sounds. No stridor. No wheezing or rales.    Abdominal: General: Abdomen is flat. Bowel sounds are normal. There is no distension. Palpations: Abdomen is soft. There is no mass. Tenderness: There is abdominal tenderness (epigastric tenderness). There is no guarding or rebound. Musculoskeletal:         General: No swelling. Cervical back: Neck supple. Right lower leg: No edema. Left lower leg: No edema. Skin:     General: Skin is warm and dry. Neurological:      General: No focal deficit present. Mental Status: She is alert. Mental status is at baseline. Psychiatric:         Mood and Affect: Mood normal.         Behavior: Behavior normal.          Additional Data:     Lab Results: I have personally reviewed pertinent reports. Results from last 7 days   Lab Units 11/13/23  1404   WBC Thousand/uL 13.89*   HEMOGLOBIN g/dL 13.7   HEMATOCRIT % 42.3   PLATELETS Thousands/uL 353   NEUTROS PCT % 81*   LYMPHS PCT % 13*   MONOS PCT % 5   EOS PCT % 0     Results from last 7 days   Lab Units 11/13/23  1404   SODIUM mmol/L 136   POTASSIUM mmol/L 4.0   CHLORIDE mmol/L 98   CO2 mmol/L 25   BUN mg/dL 13   CREATININE mg/dL 0.69   ANION GAP mmol/L 13   CALCIUM mg/dL 9.8   ALBUMIN g/dL 4.6   TOTAL BILIRUBIN mg/dL 0.51   ALK PHOS U/L 51   ALT U/L 9   AST U/L 21   GLUCOSE RANDOM mg/dL 95                 Results from last 7 days   Lab Units 11/13/23  1404   LACTIC ACID mmol/L 0.9       Imaging: I have personally reviewed pertinent reports. CT abdomen pelvis with contrast   Final Result by Peewee Ornelas MD (11/13 1524)   No acute findings. Workstation performed: [id number]             [employer] / [employer] Records Reviewed: Yes     ** Please Note: This note has been constructed using a voice recognition system.  **

## 2023-11-13 NOTE — INCIDENTAL FINDINGS
Chart review found elevated leukocytosis for the past 2 years on multiple CBC checks  Recommend outpatient hematology followup

## 2023-11-13 NOTE — PLAN OF CARE
Problem: PAIN - ADULT  Goal: Verbalizes/displays adequate comfort level or baseline comfort level  Description: Interventions:  - Encourage patient to monitor pain and request assistance  - Assess pain using appropriate pain scale  - Administer analgesics based on type and severity of pain and evaluate response  - Implement non-pharmacological measures as appropriate and evaluate response  - Consider cultural and social influences on pain and pain management  - Notify physician/advanced practitioner if interventions unsuccessful or patient reports new pain  Outcome: Progressing     Problem: INFECTION - ADULT  Goal: Absence or prevention of progression during hospitalization  Description: INTERVENTIONS:  - Assess and monitor for signs and symptoms of infection  - Monitor lab/diagnostic results  - Monitor all insertion sites, i.e. indwelling lines, tubes, and drains  - Monitor endotracheal if appropriate and nasal secretions for changes in amount and color  - [place] appropriate cooling/warming therapies per order  - Administer medications as ordered  - Instruct and encourage patient and family to use good hand hygiene technique  - Identify and instruct in appropriate isolation precautions for identified infection/condition  Outcome: Progressing     Problem: SAFETY ADULT  Goal: Patient will remain free of falls  Description: INTERVENTIONS:  - Educate patient/family on patient safety including physical limitations  - Instruct patient to call for assistance with activity   - Consult OT/PT to assist with strengthening/mobility   - Keep Call bell within reach  - Keep bed low and locked with side rails adjusted as appropriate  - Keep care items and personal belongings within reach  - Initiate and maintain comfort rounds  - Make Fall Risk Sign visible to staff  - Offer Toileting every 2 Hours, in advance of need  - Initiate/Maintain alarm  - Obtain necessary fall risk management equipment:   - Apply yellow socks and bracelet for high fall risk patients  - Consider moving patient to room near nurses station  Outcome: Progressing  Goal: Maintain or return to baseline ADL function  Description: INTERVENTIONS:  -  Assess patient's ability to carry out ADLs; assess patient's baseline for ADL function and identify physical deficits which impact ability to perform ADLs (bathing, care of mouth/teeth, toileting, grooming, dressing, etc.)  - Assess/evaluate cause of self-care deficits   - Assess range of motion  - Assess patient's mobility; develop plan if impaired  - Assess patient's need for assistive devices and provide as appropriate  - Encourage maximum independence but intervene and supervise when necessary  - Involve family in performance of ADLs  - Assess for home care needs following discharge   - Consider OT consult to assist with ADL evaluation and planning for discharge  - Provide patient education as appropriate  Outcome: Progressing  Goal: Maintains/Returns to pre admission functional level  Description: INTERVENTIONS:  - Perform BMAT or MOVE assessment daily.   - Set and communicate daily mobility goal to care team and patient/family/caregiver. - Collaborate with rehabilitation services on mobility goals if consulted  - Perform Range of Motion 2 times a day. - Reposition patient every 2 hours.   - Dangle patient 2 times a day  - Stand patient 2 times a day  - Ambulate patient 2 times a day  - Out of bed to chair 2 times a day   - Out of bed for meals 2 times a day  - Out of bed for toileting  - Record patient progress and toleration of activity level   Outcome: Progressing     Problem: DISCHARGE PLANNING  Goal: Discharge to home or other facility with appropriate resources  Description: INTERVENTIONS:  - Identify barriers to discharge w/patient and caregiver  - Arrange for needed discharge resources and transportation as appropriate  - Identify discharge learning needs (meds, wound care, etc.)  - Arrange for interpretive services to assist at discharge as needed  - Refer to Case Management Department for coordinating discharge planning if the patient needs post-hospital services based on physician/advanced practitioner order or complex needs related to functional status, cognitive ability, or social support system  Outcome: Progressing     Problem: Knowledge Deficit  Goal: Patient/family/caregiver demonstrates understanding of disease process, treatment plan, medications, and discharge instructions  Description: Complete learning assessment and assess knowledge base.   Interventions:  - Provide teaching at level of understanding  - Provide teaching via preferred learning methods  Outcome: Progressing

## 2023-11-13 NOTE — ED PROVIDER NOTES
History  Chief Complaint   Patient presents with    Abdominal Pain     Patient reports abdominal pain, was seen here Saturday for the same. Felt better after her discharge, + nausea and vomiting. This is a 29-year-old female who presents to the emergency department today for evaluation of nausea vomiting and abdominal pain. She states that she has been unable to keep down any liquids whatsoever over the last 3 days. Pain is in the epigastric region. She is has follow-up with GI recently actually on the 10th of this month. Mother bedside states the are scheduled for an endoscopy procedure tomorrow. She is unsure of the time however states that she cannot keep down any liquids without vomiting therefore brought her here. Patient denies any urinary urgency frequency or burning there is no history of black or red stool no diarrhea. Last CT scan earlier this year. Prior to Admission Medications   Prescriptions Last Dose Informant Patient Reported? Taking?    Arthritis Pain Relief 650 MG CR tablet   Yes No   Sig: TAKE 1 TABLET BY MOUTH EVERY 8 HOURS AS NEEDED FOR MODERATE PAIN   Multiple Vitamin (multivitamin) capsule   No No   Sig: Take 1 capsule by mouth daily for 10 days   acetaminophen (TYLENOL) 325 mg tablet   No No   Sig: Take 2 tablets (650 mg total) by mouth every 6 (six) hours as needed for mild pain, headaches or fever   albuterol (ProAir HFA) 90 mcg/act inhaler   No No   Sig: Inhale 2 puffs every 4 (four) hours as needed for wheezing or shortness of breath (2 puffs eric 4-6 hours around the clock for the next 72 hours and then every 4 hours as needed)   famotidine (PEPCID) 40 MG tablet   No No   Sig: Take 1 tablet (40 mg total) by mouth daily   hyoscyamine (LEVSIN/SL) 0.125 mg SL tablet   No No   Sig: Take 1 tablet (0.125 mg total) by mouth every 6 (six) hours as needed for cramping (abd cramping)   ondansetron (Zofran ODT) 4 mg disintegrating tablet   No No   Sig: Take 1 tablet (4 mg total) by mouth every 8 (eight) hours as needed for nausea or vomiting      Facility-Administered Medications: None       Past Medical History:   Diagnosis Date    Asthma        Past Surgical History:   Procedure Laterality Date    CYST REMOVAL      SHOULDER SURGERY      R shoulder    TONSILLECTOMY         History reviewed. No pertinent family history. I have reviewed and agree with the history as documented. E-Cigarette/Vaping    E-Cigarette Use Never User      E-Cigarette/Vaping Substances    Nicotine No     THC No     CBD No     Flavoring No     Other No     Unknown No      Social History     Tobacco Use    Smoking status: Every Day     Packs/day: 0.25     Types: Cigarettes    Smokeless tobacco: Never   Vaping Use    Vaping Use: Never used   Substance Use Topics    Alcohol use: Not Currently     Comment: socailly    Drug use: Yes     Types: Marijuana       Review of Systems   Constitutional:  Negative for chills and fever. HENT:  Negative for ear pain and sore throat. Eyes:  Negative for pain and visual disturbance. Respiratory:  Negative for cough and shortness of breath. Cardiovascular:  Negative for chest pain and palpitations. Gastrointestinal:  Positive for abdominal pain, nausea and vomiting. Genitourinary:  Negative for dysuria and hematuria. Musculoskeletal:  Negative for arthralgias and back pain. Skin:  Negative for color change and rash. Neurological:  Negative for seizures and syncope. All other systems reviewed and are negative. Physical Exam  Physical Exam  Vitals reviewed. Constitutional:       General: She is not in acute distress. Appearance: She is well-developed. She is not ill-appearing or diaphoretic. HENT:      Right Ear: External ear normal. No swelling. Tympanic membrane is not bulging. Left Ear: External ear normal. No swelling. Tympanic membrane is not bulging. Nose: Nose normal.      Mouth/Throat:      Pharynx: No oropharyngeal exudate.    Eyes: General: Lids are normal.      Conjunctiva/sclera: Conjunctivae normal.      Pupils: Pupils are equal, round, and reactive to light. Neck:      Thyroid: No thyromegaly. Vascular: No JVD. Trachea: No tracheal deviation. Cardiovascular:      Rate and Rhythm: Normal rate and regular rhythm. Pulses: Normal pulses. Heart sounds: Normal heart sounds. No murmur heard. No friction rub. No gallop. Pulmonary:      Effort: Pulmonary effort is normal. No respiratory distress. Breath sounds: Normal breath sounds. No stridor. No wheezing or rales. Chest:      Chest wall: No tenderness. Abdominal:      General: Bowel sounds are normal. There is no distension. Palpations: Abdomen is soft. There is no mass. Tenderness: There is abdominal tenderness in the epigastric area. There is no guarding or rebound. Negative signs include Medina's sign. Hernia: No hernia is present. Musculoskeletal:         General: No tenderness. Normal range of motion. Cervical back: Normal range of motion and neck supple. No edema. Normal range of motion. Lymphadenopathy:      Cervical: No cervical adenopathy. Skin:     General: Skin is warm and dry. Capillary Refill: Capillary refill takes less than 2 seconds. Coloration: Skin is not pale. Findings: No erythema or rash. Neurological:      Mental Status: She is alert and oriented to person, place, and time. GCS: GCS eye subscore is 4. GCS verbal subscore is 5. GCS motor subscore is 6. Cranial Nerves: No cranial nerve deficit. Sensory: No sensory deficit. Deep Tendon Reflexes: Reflexes are normal and symmetric.    Psychiatric:         Speech: Speech normal.         Behavior: Behavior normal.         Vital Signs  ED Triage Vitals [11/13/23 1342]   Temperature Pulse Respirations Blood Pressure SpO2   97.8 °F (36.6 °C) 74 18 130/73 100 %      Temp Source Heart Rate Source Patient Position - Orthostatic VS BP Location FiO2 (%)   Temporal Monitor Lying Left arm --      Pain Score       10 - Worst Possible Pain           Vitals:    11/13/23 1342   BP: 130/73   Pulse: 74   Patient Position - Orthostatic VS: Lying         Visual Acuity      ED Medications  Medications   multi-electrolyte (ISOLYTE-S PH 7.4) bolus 2,000 mL ( Intravenous Restarted 11/13/23 1528)   ondansetron (ZOFRAN) injection 4 mg (4 mg Intravenous Given 11/13/23 1404)   iohexol (OMNIPAQUE) 350 MG/ML injection (MULTI-DOSE) 100 mL (100 mL Intravenous Given 11/13/23 1437)   cefTRIAXone (ROCEPHIN) IVPB (premix in dextrose) 1,000 mg 50 mL (0 mg Intravenous Stopped 11/13/23 1529)   HYDROmorphone (DILAUDID) injection 1 mg (1 mg Intravenous Given 11/13/23 1610)   ondansetron (ZOFRAN) injection 4 mg (4 mg Intravenous Given 11/13/23 1610)       Diagnostic Studies  Results Reviewed       Procedure Component Value Units Date/Time    Urine culture [[id number]]     Lab Status: No result Specimen: Urine, Clean Catch     FLU/RSV/COVID - if FLU/RSV clinically relevant [[id number]]  (Normal) Collected: 11/13/23 1352    Lab Status: Final result Specimen: Nares from Nose Updated: 11/13/23 1504     SARS-CoV-2 Negative     INFLUENZA A PCR Negative     INFLUENZA B PCR Negative     RSV PCR Negative    Narrative:      FOR PEDIATRIC PATIENTS - copy/paste COVID Guidelines URL to browser: [email or web address]    SARS-CoV-2 assay is a Nucleic Acid Amplification assay intended for the  qualitative detection of nucleic acid from SARS-CoV-2 in nasopharyngeal  swabs. Results are for the presumptive identification of SARS-CoV-2 RNA. Positive results are indicative of infection with SARS-CoV-2, the virus  causing COVID-19, but do not rule out bacterial infection or co-infection  with other viruses.  Laboratories within the [facility] and its  territories are required to report all positive results to the appropriate  public health authorities. Negative results do not preclude SARS-CoV-2  infection and should not be used as the sole basis for treatment or other  patient management decisions. Negative results must be combined with  clinical observations, patient history, and epidemiological information. This test has not been FDA cleared or approved. This test has been authorized by FDA under an Emergency Use Authorization  (EUA). This test is only authorized for the duration of time the  declaration that circumstances exist justifying the authorization of the  emergency use of an in vitro diagnostic tests for detection of SARS-CoV-2  virus and/or diagnosis of COVID-19 infection under section 564(b)(1) of  the Act, 21 U. S.C. 311ECH-9(M)(9), unless the authorization is terminated  or revoked sooner. The test has been validated but independent review by FDA  and CLIA is pending. Test performed using [employer]: This RT-PCR assay targets N2,  a region unique to SARS-CoV-2. A conserved region in the E-gene was chosen  for pan-Sarbecovirus detection which includes SARS-CoV-2. According to CMS-2020-01-R, this platform meets the definition of high-throughput technology.     Urine Microscopic [[id number]]  (Abnormal) Collected: 11/13/23 1423    Lab Status: Final result Specimen: Urine, Clean Catch Updated: 11/13/23 1457     RBC, UA 0-5 /hpf      WBC, UA 4-10 /hpf      Epithelial Cells Occasional /hpf      Bacteria, UA Moderate /hpf      WBC Clumps OCCASSIONALLY OBSERVED     OTHER OBSERVATIONS Transitional Epithelial Cells     MUCUS THREADS Occasional    Rapid drug screen, urine [[id number]]  (Abnormal) Collected: 11/13/23 1423    Lab Status: Final result Specimen: Urine, Clean Catch Updated: 11/13/23 1454     Amph/Meth UR Negative     Barbiturate Ur Negative     Benzodiazepine Urine Negative     Cocaine Urine Negative     Methadone Urine Negative     Opiate Urine Negative     PCP Ur Negative     THC Urine Positive     Oxycodone Urine Negative    Narrative:      Presumptive report. If requested, specimen will be sent to reference lab for confirmation. FOR MEDICAL PURPOSES ONLY. IF CONFIRMATION NEEDED PLEASE CONTACT THE LAB WITHIN 5 DAYS.     Drug Screen Cutoff Levels:  AMPHETAMINE/METHAMPHETAMINES  1000 ng/mL  BARBITURATES     200 ng/mL  BENZODIAZEPINES     200 ng/mL  COCAINE      300 ng/mL  METHADONE      300 ng/mL  OPIATES      300 ng/mL  PHENCYCLIDINE     25 ng/mL  THC       50 ng/mL  OXYCODONE      100 ng/mL    UA w Reflex to Microscopic w Reflex to Culture [[id number]]  (Abnormal) Collected: 11/13/23 1423    Lab Status: Final result Specimen: Urine, Clean Catch Updated: 11/13/23 1435     Color, UA Yellow     Clarity, UA Clear     Specific Gravity, UA >=1.030     pH, UA 6.5     Leukocytes, UA Trace     Nitrite, UA Negative     Protein, UA Trace mg/dl      Glucose, UA Negative mg/dl      Ketones, UA >=80 (3+) mg/dl      Urobilinogen, UA 0.2 E.U./dl      Bilirubin, UA Small     Occult Blood, UA Negative    Comprehensive metabolic panel [[id number]] Collected: 11/13/23 1404    Lab Status: Final result Specimen: Blood from Arm, Right Updated: 11/13/23 1431     Sodium 136 mmol/L      Potassium 4.0 mmol/L      Chloride 98 mmol/L      CO2 25 mmol/L      ANION GAP 13 mmol/L      BUN 13 mg/dL      Creatinine 0.69 mg/dL      Glucose 95 mg/dL      Calcium 9.8 mg/dL      AST 21 U/L      ALT 9 U/L      Alkaline Phosphatase 51 U/L      Total Protein 7.9 g/dL      Albumin 4.6 g/dL      Total Bilirubin 0.51 mg/dL      eGFR 116 ml/min/1.73sq m     Narrative:      [place] guidelines for Chronic Kidney Disease (CKD):     Stage 1 with normal or high GFR (GFR > 90 mL/min/1.73 square meters)    Stage 2 Mild CKD (GFR = 60-89 mL/min/1.73 square meters)    Stage 3A Moderate CKD (GFR = 45-59 mL/min/1.73 square meters)    Stage 3B Moderate CKD (GFR = 30-44 mL/min/1.73 square meters)    Stage 4 Severe CKD (GFR = 15-29 mL/min/1.73 square meters)    Stage 5 End Stage CKD (GFR <15 mL/min/1.73 square meters)  Note: GFR calculation is accurate only with a steady state creatinine    Lipase [[id number]]  (Normal) Collected: 11/13/23 1404    Lab Status: Final result Specimen: Blood from Arm, Right Updated: 11/13/23 1431     Lipase 26 u/L     Lactic acid, plasma (w/reflex if result > 2.0) [[id number]]  (Normal) Collected: 11/13/23 1404    Lab Status: Final result Specimen: Blood from Arm, Left Updated: 11/13/23 1429     LACTIC ACID 0.9 mmol/L     Narrative:      Result may be elevated if tourniquet was used during collection.     POCT pregnancy, urine [[id number]]  (Normal) Resulted: 11/13/23 1424    Lab Status: Final result Updated: 11/13/23 1424     EXT Preg Test, Ur Negative     Control Valid    Blood gas, venous [[id number]]  (Abnormal) Collected: 11/13/23 1404    Lab Status: Final result Specimen: Blood from Arm, Right Updated: 11/13/23 1416     pH, Rupesh 7.459     pCO2, Rupesh 35.7 mm Hg      pO2, Rupesh 37.1 mm Hg      HCO3, Rupesh 24.8 mmol/L      Base Excess, Rupesh 1.3 mmol/L      O2 Content, Rupesh 14.7 ml/dL      O2 HGB, VENOUS 72.6 %     CBC and differential [[id number]]  (Abnormal) Collected: 11/13/23 1404    Lab Status: Final result Specimen: Blood from Arm, Right Updated: 11/13/23 1411     WBC 13.89 Thousand/uL      RBC 4.93 Million/uL      Hemoglobin 13.7 g/dL      Hematocrit 42.3 %      MCV 86 fL      MCH 27.8 pg      MCHC 32.4 g/dL      RDW 14.1 %      MPV 10.1 fL      Platelets 094 Thousands/uL      nRBC 0 /100 WBCs      Neutrophils Relative 81 %      Immat GRANS % 0 %      Lymphocytes Relative 13 %      Monocytes Relative 5 %      Eosinophils Relative 0 %      Basophils Relative 1 %      Neutrophils Absolute 11.13 Thousands/µL      Immature Grans Absolute 0.05 Thousand/uL      Lymphocytes Absolute 1.83 Thousands/µL      Monocytes Absolute 0.74 Thousand/µL      Eosinophils Absolute 0.05 Thousand/µL      Basophils Absolute 0.09 Thousands/µL CT abdomen pelvis with contrast   Final Result by Félix Birch MD (11/13 1524)   No acute findings. Workstation performed: [id number]                    Procedures  Procedures         ED Course  ED Course as of 11/13/23 1614   Mon Nov 13, 2023   1426 CBC reviewed there is mild leukocytosis no anemia or thrombocytopenia, urine dip is negative for pregnancy, venous pH is not noting acidosis   1455 Anion gap 13, rapid drug screen positive for THC, metabolic panel within normal limits, awaiting CT interpretation and urine microscopy   1501 WBC, UA(!): 4-10   1501 Bacteria, UA(!): Moderate   1550 IMPRESSION:  No acute findings. 1605 Patient was reexamined again at bedside at 1600 hrs., she is still experiencing abdominal pain she continues to vomit. Mother states she received a phone call from GI scheduling her for tomorrow morning for her scope, I do not believe she will be able to be safely discharged home tonight with the lack of oral intake ability. Will message internal medicine and GI with plans to admit here for her scope. 1610 TT sent to [facility] and GI                               SBIRT 20yo+      Flowsheet Row Most Recent Value   Initial Alcohol Screen: US AUDIT-C     1. How often do you have a drink containing alcohol? 0 Filed at: 11/13/2023 1342   2. How many drinks containing alcohol do you have on a typical day you are drinking? 0 Filed at: 11/13/2023 1342   3a. Male UNDER 65: How often do you have five or more drinks on one occasion? 0 Filed at: 11/13/2023 1342   3b. FEMALE Any Age, or MALE 65+: How often do you have 4 or more drinks on one occassion? 0 Filed at: 11/13/2023 1342   Audit-C Score 0 Filed at: 11/13/2023 1342   ISAIAH: How many times in the past year have you. .. Used an illegal drug or used a prescription medication for non-medical reasons?  Never Filed at: 11/13/2023 1342                      Medical Decision Making  63-year-old female here for inability to keep down any solids or liquids x3 days accompanied with epigastric abdominal pain and nausea. Recently did see GI I did review that visit mother states scheduled for endoscopy tomorrow. Here for intractable vomiting. Documented prior cannabis use. Differential diagnosis today includes dehydration, electrolyte disturbance, acute kidney injury, pancreatitis, bowel obstruction, appendicitis, cholecystitis, UTI, pregnancy. Work-up complete, noteworthy for mild leukocytosis, +3 ketones in the urine as well as urinary tract infection on microscopy with urine. She continued to vomit. Her scope is scheduled for tomorrow morning here at this campus I discussed case with internal medicine and [facility] gastroenterology we will admit here for observation for pain control hydration antiemetics. Amount and/or Complexity of Data Reviewed  Labs: ordered. Decision-making details documented in ED Course. Radiology: ordered. Risk  Prescription drug management. Decision regarding hospitalization. Disposition  Final diagnoses:   UTI (urinary tract infection)   Abdominal pain   Vomiting     Time reflects when diagnosis was documented in both MDM as applicable and the Disposition within this note       Time User Action Codes Description Comment    11/13/2023  3:02 PM Gordon LOWE Add [N39.0] UTI (urinary tract infection)     11/13/2023  4:13 PM Gordon LOWE Add [R10.9] Abdominal pain     11/13/2023  4:14 PM Gordon LOWE Add [R11.10] Vomiting           ED Disposition       ED Disposition   Admit    Condition   Stable    Date/Time   Mon Nov 13, 2023 1613    Comment   Case was discussed with Dr. Elizondo and the patient's admission status was agreed to be Admission Status: observation status to the service of Dr. Elizondo . Follow-up Information    None         Patient's Medications   Discharge Prescriptions    No medications on file       No discharge procedures on file.     PDMP Review None            ED Provider  Electronically Signed by             Ovidio Ozuna PA-C  11/13/23 [id number]

## 2023-11-14 ENCOUNTER — APPOINTMENT (OUTPATIENT)
Dept: PERIOP | Facility: HOSPITAL | Age: 33
DRG: 249 | End: 2023-11-14
Payer: COMMERCIAL

## 2023-11-14 ENCOUNTER — ANESTHESIA EVENT (INPATIENT)
Dept: PERIOP | Facility: HOSPITAL | Age: 33
DRG: 249 | End: 2023-11-14
Payer: COMMERCIAL

## 2023-11-14 ENCOUNTER — ANESTHESIA (INPATIENT)
Dept: PERIOP | Facility: HOSPITAL | Age: 33
DRG: 249 | End: 2023-11-14
Payer: COMMERCIAL

## 2023-11-14 PROBLEM — R10.13 EPIGASTRIC PAIN: Status: ACTIVE | Noted: 2023-11-14

## 2023-11-14 LAB
ALBUMIN SERPL BCP-MCNC: 3.7 G/DL (ref 3.5–5)
ALP SERPL-CCNC: 41 U/L (ref 34–104)
ALT SERPL W P-5'-P-CCNC: 6 U/L (ref 7–52)
ANION GAP SERPL CALCULATED.3IONS-SCNC: 8 MMOL/L
APTT PPP: 30 SECONDS (ref 23–37)
AST SERPL W P-5'-P-CCNC: 10 U/L (ref 13–39)
BACTERIA UR CULT: NORMAL
BASOPHILS # BLD AUTO: 0.09 THOUSANDS/ÂΜL (ref 0–0.1)
BASOPHILS NFR BLD AUTO: 1 % (ref 0–1)
BILIRUB SERPL-MCNC: 0.4 MG/DL (ref 0.2–1)
BUN SERPL-MCNC: 7 MG/DL (ref 5–25)
CALCIUM SERPL-MCNC: 8.7 MG/DL (ref 8.4–10.2)
CHLORIDE SERPL-SCNC: 104 MMOL/L (ref 96–108)
CO2 SERPL-SCNC: 27 MMOL/L (ref 21–32)
CREAT SERPL-MCNC: 0.65 MG/DL (ref 0.6–1.3)
EOSINOPHIL # BLD AUTO: 0.13 THOUSAND/ÂΜL (ref 0–0.61)
EOSINOPHIL NFR BLD AUTO: 1 % (ref 0–6)
ERYTHROCYTE [DISTWIDTH] IN BLOOD BY AUTOMATED COUNT: 13.9 % (ref 11.6–15.1)
GFR SERPL CREATININE-BSD FRML MDRD: 118 ML/MIN/1.73SQ M
GLUCOSE P FAST SERPL-MCNC: 72 MG/DL (ref 65–99)
GLUCOSE SERPL-MCNC: 72 MG/DL (ref 65–140)
HCT VFR BLD AUTO: 35.8 % (ref 34.8–46.1)
HGB BLD-MCNC: 11.3 G/DL (ref 11.5–15.4)
IMM GRANULOCYTES # BLD AUTO: 0.03 THOUSAND/UL (ref 0–0.2)
IMM GRANULOCYTES NFR BLD AUTO: 0 % (ref 0–2)
INR PPP: 1.18 (ref 0.84–1.19)
LYMPHOCYTES # BLD AUTO: 3.65 THOUSANDS/ÂΜL (ref 0.6–4.47)
LYMPHOCYTES NFR BLD AUTO: 36 % (ref 14–44)
MAGNESIUM SERPL-MCNC: 2.2 MG/DL (ref 1.9–2.7)
MCH RBC QN AUTO: 27.5 PG (ref 26.8–34.3)
MCHC RBC AUTO-ENTMCNC: 31.6 G/DL (ref 31.4–37.4)
MCV RBC AUTO: 87 FL (ref 82–98)
MONOCYTES # BLD AUTO: 0.74 THOUSAND/ÂΜL (ref 0.17–1.22)
MONOCYTES NFR BLD AUTO: 7 % (ref 4–12)
NEUTROPHILS # BLD AUTO: 5.41 THOUSANDS/ÂΜL (ref 1.85–7.62)
NEUTS SEG NFR BLD AUTO: 55 % (ref 43–75)
NRBC BLD AUTO-RTO: 0 /100 WBCS
PHOSPHATE SERPL-MCNC: 3.5 MG/DL (ref 2.7–4.5)
PLATELET # BLD AUTO: 310 THOUSANDS/UL (ref 149–390)
PMV BLD AUTO: 9.8 FL (ref 8.9–12.7)
POTASSIUM SERPL-SCNC: 3.4 MMOL/L (ref 3.5–5.3)
PROCALCITONIN SERPL-MCNC: <0.05 NG/ML
PROT SERPL-MCNC: 6 G/DL (ref 6.4–8.4)
PROTHROMBIN TIME: 14.9 SECONDS (ref 11.6–14.5)
RBC # BLD AUTO: 4.11 MILLION/UL (ref 3.81–5.12)
SODIUM SERPL-SCNC: 139 MMOL/L (ref 135–147)
WBC # BLD AUTO: 10.05 THOUSAND/UL (ref 4.31–10.16)

## 2023-11-14 PROCEDURE — 83735 ASSAY OF MAGNESIUM: CPT | Performed by: STUDENT IN AN ORGANIZED HEALTH CARE EDUCATION/TRAINING PROGRAM

## 2023-11-14 PROCEDURE — 85610 PROTHROMBIN TIME: CPT | Performed by: STUDENT IN AN ORGANIZED HEALTH CARE EDUCATION/TRAINING PROGRAM

## 2023-11-14 PROCEDURE — 84100 ASSAY OF PHOSPHORUS: CPT | Performed by: STUDENT IN AN ORGANIZED HEALTH CARE EDUCATION/TRAINING PROGRAM

## 2023-11-14 PROCEDURE — 0DB98ZX EXCISION OF DUODENUM, VIA NATURAL OR ARTIFICIAL OPENING ENDOSCOPIC, DIAGNOSTIC: ICD-10-PCS | Performed by: INTERNAL MEDICINE

## 2023-11-14 PROCEDURE — 88342 IMHCHEM/IMCYTCHM 1ST ANTB: CPT | Performed by: PATHOLOGY

## 2023-11-14 PROCEDURE — 88305 TISSUE EXAM BY PATHOLOGIST: CPT | Performed by: PATHOLOGY

## 2023-11-14 PROCEDURE — 0DB68ZX EXCISION OF STOMACH, VIA NATURAL OR ARTIFICIAL OPENING ENDOSCOPIC, DIAGNOSTIC: ICD-10-PCS | Performed by: INTERNAL MEDICINE

## 2023-11-14 PROCEDURE — 80053 COMPREHEN METABOLIC PANEL: CPT | Performed by: STUDENT IN AN ORGANIZED HEALTH CARE EDUCATION/TRAINING PROGRAM

## 2023-11-14 PROCEDURE — 99232 SBSQ HOSP IP/OBS MODERATE 35: CPT | Performed by: INTERNAL MEDICINE

## 2023-11-14 PROCEDURE — 84145 PROCALCITONIN (PCT): CPT | Performed by: INTERNAL MEDICINE

## 2023-11-14 PROCEDURE — 85025 COMPLETE CBC W/AUTO DIFF WBC: CPT | Performed by: STUDENT IN AN ORGANIZED HEALTH CARE EDUCATION/TRAINING PROGRAM

## 2023-11-14 PROCEDURE — 43239 EGD BIOPSY SINGLE/MULTIPLE: CPT | Performed by: INTERNAL MEDICINE

## 2023-11-14 PROCEDURE — 85730 THROMBOPLASTIN TIME PARTIAL: CPT | Performed by: STUDENT IN AN ORGANIZED HEALTH CARE EDUCATION/TRAINING PROGRAM

## 2023-11-14 RX ORDER — POTASSIUM CHLORIDE 14.9 MG/ML
20 INJECTION INTRAVENOUS ONCE
Status: COMPLETED | OUTPATIENT
Start: 2023-11-14 | End: 2023-11-14

## 2023-11-14 RX ORDER — ACETAMINOPHEN 325 MG/1
650 TABLET ORAL EVERY 6 HOURS PRN
Status: DISCONTINUED | OUTPATIENT
Start: 2023-11-14 | End: 2023-11-16 | Stop reason: HOSPADM

## 2023-11-14 RX ORDER — SODIUM CHLORIDE, SODIUM LACTATE, POTASSIUM CHLORIDE, CALCIUM CHLORIDE 600; 310; 30; 20 MG/100ML; MG/100ML; MG/100ML; MG/100ML
INJECTION, SOLUTION INTRAVENOUS CONTINUOUS PRN
Status: DISCONTINUED | OUTPATIENT
Start: 2023-11-14 | End: 2023-11-14

## 2023-11-14 RX ORDER — POTASSIUM CHLORIDE 14.9 MG/ML
20 INJECTION INTRAVENOUS
Status: DISPENSED | OUTPATIENT
Start: 2023-11-14 | End: 2023-11-14

## 2023-11-14 RX ORDER — HYDROMORPHONE HCL/PF 1 MG/ML
0.5 SYRINGE (ML) INJECTION EVERY 4 HOURS PRN
Status: DISCONTINUED | OUTPATIENT
Start: 2023-11-14 | End: 2023-11-16 | Stop reason: HOSPADM

## 2023-11-14 RX ORDER — PROPOFOL 10 MG/ML
INJECTION, EMULSION INTRAVENOUS AS NEEDED
Status: DISCONTINUED | OUTPATIENT
Start: 2023-11-14 | End: 2023-11-14

## 2023-11-14 RX ORDER — PROMETHAZINE HYDROCHLORIDE 25 MG/ML
25 INJECTION, SOLUTION INTRAMUSCULAR; INTRAVENOUS EVERY 4 HOURS PRN
Status: DISCONTINUED | OUTPATIENT
Start: 2023-11-14 | End: 2023-11-16 | Stop reason: HOSPADM

## 2023-11-14 RX ORDER — ONDANSETRON 2 MG/ML
INJECTION INTRAMUSCULAR; INTRAVENOUS AS NEEDED
Status: DISCONTINUED | OUTPATIENT
Start: 2023-11-14 | End: 2023-11-14

## 2023-11-14 RX ORDER — SENNOSIDES 8.6 MG
1 TABLET ORAL
Status: DISCONTINUED | OUTPATIENT
Start: 2023-11-14 | End: 2023-11-16 | Stop reason: HOSPADM

## 2023-11-14 RX ORDER — LIDOCAINE HYDROCHLORIDE 10 MG/ML
INJECTION, SOLUTION EPIDURAL; INFILTRATION; INTRACAUDAL; PERINEURAL AS NEEDED
Status: DISCONTINUED | OUTPATIENT
Start: 2023-11-14 | End: 2023-11-14

## 2023-11-14 RX ORDER — POLYETHYLENE GLYCOL 3350 17 G/17G
17 POWDER, FOR SOLUTION ORAL DAILY PRN
Status: DISCONTINUED | OUTPATIENT
Start: 2023-11-14 | End: 2023-11-15

## 2023-11-14 RX ORDER — SODIUM CHLORIDE, SODIUM LACTATE, POTASSIUM CHLORIDE, CALCIUM CHLORIDE 600; 310; 30; 20 MG/100ML; MG/100ML; MG/100ML; MG/100ML
125 INJECTION, SOLUTION INTRAVENOUS CONTINUOUS
Status: DISCONTINUED | OUTPATIENT
Start: 2023-11-14 | End: 2023-11-14

## 2023-11-14 RX ORDER — DOCUSATE SODIUM 100 MG/1
100 CAPSULE, LIQUID FILLED ORAL 2 TIMES DAILY
Status: DISCONTINUED | OUTPATIENT
Start: 2023-11-14 | End: 2023-11-16 | Stop reason: HOSPADM

## 2023-11-14 RX ORDER — ONDANSETRON 2 MG/ML
4 INJECTION INTRAMUSCULAR; INTRAVENOUS EVERY 4 HOURS PRN
Status: DISCONTINUED | OUTPATIENT
Start: 2023-11-14 | End: 2023-11-14

## 2023-11-14 RX ORDER — METRONIDAZOLE 500 MG/1
500 TABLET ORAL 3 TIMES DAILY
Status: DISCONTINUED | OUTPATIENT
Start: 2023-11-14 | End: 2023-11-16 | Stop reason: HOSPADM

## 2023-11-14 RX ORDER — TETRACYCLINE HYDROCHLORIDE 500 MG/1
500 CAPSULE ORAL 4 TIMES DAILY
Status: DISCONTINUED | OUTPATIENT
Start: 2023-11-14 | End: 2023-11-16 | Stop reason: HOSPADM

## 2023-11-14 RX ORDER — PANTOPRAZOLE SODIUM 40 MG/1
40 TABLET, DELAYED RELEASE ORAL 2 TIMES DAILY
Status: DISCONTINUED | OUTPATIENT
Start: 2023-11-14 | End: 2023-11-16 | Stop reason: HOSPADM

## 2023-11-14 RX ADMIN — PROMETHAZINE HYDROCHLORIDE 25 MG: 25 INJECTION INTRAMUSCULAR; INTRAVENOUS at 10:00

## 2023-11-14 RX ADMIN — PANTOPRAZOLE SODIUM 40 MG: 40 TABLET, DELAYED RELEASE ORAL at 22:13

## 2023-11-14 RX ADMIN — MORPHINE SULFATE 2 MG: 2 INJECTION, SOLUTION INTRAMUSCULAR; INTRAVENOUS at 00:25

## 2023-11-14 RX ADMIN — NICOTINE 1 PATCH: 21 PATCH, EXTENDED RELEASE TRANSDERMAL at 09:30

## 2023-11-14 RX ADMIN — PROPOFOL 50 MG: 10 INJECTION, EMULSION INTRAVENOUS at 14:58

## 2023-11-14 RX ADMIN — SODIUM CHLORIDE, SODIUM LACTATE, POTASSIUM CHLORIDE, AND CALCIUM CHLORIDE: .6; .31; .03; .02 INJECTION, SOLUTION INTRAVENOUS at 14:43

## 2023-11-14 RX ADMIN — DOCUSATE SODIUM 100 MG: 100 CAPSULE, LIQUID FILLED ORAL at 18:41

## 2023-11-14 RX ADMIN — FLUTICASONE FUROATE 1 PUFF: 100 POWDER RESPIRATORY (INHALATION) at 09:34

## 2023-11-14 RX ADMIN — PROPOFOL 50 MG: 10 INJECTION, EMULSION INTRAVENOUS at 15:00

## 2023-11-14 RX ADMIN — TETRACYCLINE HYDROCHLORIDE 500 MG: 500 CAPSULE ORAL at 18:41

## 2023-11-14 RX ADMIN — MORPHINE SULFATE 2 MG: 2 INJECTION, SOLUTION INTRAMUSCULAR; INTRAVENOUS at 07:08

## 2023-11-14 RX ADMIN — POTASSIUM CHLORIDE 20 MEQ: 14.9 INJECTION, SOLUTION INTRAVENOUS at 15:47

## 2023-11-14 RX ADMIN — METRONIDAZOLE 500 MG: 500 TABLET ORAL at 22:13

## 2023-11-14 RX ADMIN — ALBUTEROL SULFATE 2 PUFF: 90 AEROSOL, METERED RESPIRATORY (INHALATION) at 09:30

## 2023-11-14 RX ADMIN — PROPOFOL 50 MG: 10 INJECTION, EMULSION INTRAVENOUS at 14:55

## 2023-11-14 RX ADMIN — LIDOCAINE HYDROCHLORIDE 100 MG: 10 INJECTION, SOLUTION EPIDURAL; INFILTRATION; INTRACAUDAL; PERINEURAL at 14:53

## 2023-11-14 RX ADMIN — ONDANSETRON 4 MG: 2 INJECTION INTRAMUSCULAR; INTRAVENOUS at 07:49

## 2023-11-14 RX ADMIN — SENNOSIDES 8.6 MG: 8.6 TABLET, FILM COATED ORAL at 22:13

## 2023-11-14 RX ADMIN — HYDROMORPHONE HYDROCHLORIDE 0.5 MG: 1 INJECTION, SOLUTION INTRAMUSCULAR; INTRAVENOUS; SUBCUTANEOUS at 23:24

## 2023-11-14 RX ADMIN — POTASSIUM CHLORIDE 20 MEQ: 14.9 INJECTION, SOLUTION INTRAVENOUS at 09:31

## 2023-11-14 RX ADMIN — SODIUM CHLORIDE 125 ML/HR: 0.9 INJECTION, SOLUTION INTRAVENOUS at 01:32

## 2023-11-14 RX ADMIN — ONDANSETRON 4 MG: 2 INJECTION INTRAMUSCULAR; INTRAVENOUS at 15:05

## 2023-11-14 RX ADMIN — Medication 524 MG: at 18:42

## 2023-11-14 RX ADMIN — PROPOFOL 100 MG: 10 INJECTION, EMULSION INTRAVENOUS at 14:53

## 2023-11-14 RX ADMIN — PROPOFOL 50 MG: 10 INJECTION, EMULSION INTRAVENOUS at 14:56

## 2023-11-14 RX ADMIN — PROMETHAZINE HYDROCHLORIDE 25 MG: 25 INJECTION INTRAMUSCULAR; INTRAVENOUS at 23:37

## 2023-11-14 RX ADMIN — PROPOFOL 50 MG: 10 INJECTION, EMULSION INTRAVENOUS at 14:54

## 2023-11-14 NOTE — PROGRESS NOTES
[address]  Progress Note  Name: Zenon Sung  MRN: [id number]  Unit/Bed#: 846-03 I Date of Admission: 2023   Date of Service: 2023 I Hospital Day: 0    Assessment/Plan   * Intractable nausea and vomiting  Assessment & Plan  -H pylori antigen, stool test on 11/10 --> tested positive  --> pending GI input on treatment of H pylori after endoscopy  -re-ordered outpatient stool test for her current hospital stay to help clarify diagnosis - f/u Giardian antigen, stool enteric bacterial panel PCR, fecal calprotectin (not collected as outpatient)  -ova parasite - pending 11/10 lab result  -CRP 4.1 on 23  -celiac disease antibody negative on 23    -consulted gastroenterology  -plan for endoscopy on   -IVF at 125cc/hr to prevent dehydration  -keep NPO  -did not start IV pantoprazole due to endoscopy tomorrow, possibly can affect H pylori result  -IV zofran PRN --> IV promethazine PRN  -as needed tylenol, dilaudid for pain management    Positive H. pylori test  Assessment & Plan  -pending GI input as above    Epigastric pain  Assessment & Plan  -worsening epigastric pain on  morning  -EKG  was normal sinus rhythm, no acute concerns  -reproducible on physical exam upon palpation  -f/u endoscopy report    Mild persistent asthma without complication  Assessment & Plan  -symptoms not controlled on albuterol inhaler PRN at home, wakes up at night 2+ per week, uses albuterol multiple times per week    -to be discharged on ICS inhaler for maintenance (new medication)  -needs albuterol inhaler at discharge, reports this medication has   -reports to have spirometry in the past for diaignosis      Acute cystitis without hematuria  Assessment & Plan  -IV ceftriaxone ( - ), plan to transition to PO antibiotics once able to tolerate PO intake  -f/u urine culture  -not likely to explain her vomiting for the past 2 weeks    Hepatic steatosis  Assessment & Plan  -confirmed on 2023 RUQ ultrasound, again seen on CT A/P on admission  -dietary modification    Leukocytosis  Assessment & Plan  -recommend outpatient hematology evaluation for her elevated WBC, seen in past 2 years             VTE Pharmacologic Prophylaxis:   Pharmacologic: Not needed  Mechanical VTE Prophylaxis in Place: Yes      Discussions with Specialists or Other Care Team Provider: GI    Education and Discussions with Family / Patient: Yes    Time Spent for Care: I spent 20 minutes for the care of the patient. Greater than 50% of the total time was spent on counseling and coordination of care as described above. Current Length of Stay: 0 day(s)    Current Patient Status: Inpatient   Certification Statement: The patient will continue to require additional inpatient hospital stay due to endoscopy today. Discharge Plan: 1+ day    Code Status: Level 1 - Full Code      Subjective:   Reports epigastric pain and lower chest pain, had 1x vomiting this morning, persistent nausea. She reports the chest pain feels like chest tightness and burning sensation upward from her stomach. Denies she is having asthma exacerbation. Denies having SOB. The pain is reproducible upon touch. Objective:     Vitals:   Temp (24hrs), Av.5 °F (36.9 °C), Min:97.8 °F (36.6 °C), Max:98.9 °F (37.2 °C)    Temp:  [97.8 °F (36.6 °C)-98.9 °F (37.2 °C)] 98.3 °F (36.8 °C)  HR:  [71-80] 74  Resp:  [17-18] 17  BP: (105-130)/(60-80) 105/80  SpO2:  [97 %-100 %] 98 %  Body mass index is 31.67 kg/m². Input and Output Summary (last 24 hours): Intake/Output Summary (Last 24 hours) at 2023 1242  Last data filed at 2023 1229  Gross per 24 hour   Intake 50 ml   Output 400 ml   Net -350 ml       Physical Exam:     Physical Exam  Vitals and nursing note reviewed. Constitutional:       General: She is not in acute distress. Appearance: She is well-developed. HENT:      Head: Normocephalic and atraumatic. Nose: Nose normal. No congestion. Mouth/Throat:      Mouth: Mucous membranes are moist.      Pharynx: Oropharynx is clear. Eyes:      Extraocular Movements: Extraocular movements intact. Conjunctiva/sclera: Conjunctivae normal.   Cardiovascular:      Rate and Rhythm: Normal rate and regular rhythm. Pulses: Normal pulses. Heart sounds: Normal heart sounds. No murmur heard. Pulmonary:      Effort: Pulmonary effort is normal. No respiratory distress. Breath sounds: Normal breath sounds. No stridor. No wheezing or rales. Chest:      Chest wall: Tenderness (left lower anterior chest wall tenderness on palpation, reproducible) present. No crepitus or edema. Abdominal:      General: Abdomen is flat. Bowel sounds are normal. There is no distension. Palpations: Abdomen is soft. There is no mass. Tenderness: There is abdominal tenderness (epigastric tenderness). There is no guarding or rebound. Musculoskeletal:         General: No swelling. Cervical back: Neck supple. Right lower leg: No edema. Left lower leg: No edema. Skin:     General: Skin is warm and dry. Neurological:      General: No focal deficit present. Mental Status: She is alert. Mental status is at baseline.    Psychiatric:         Mood and Affect: Mood normal.         Behavior: Behavior normal.         Additional Data:     Labs:    Results from last 7 days   Lab Units 11/14/23  0459   WBC Thousand/uL 10.05   HEMOGLOBIN g/dL 11.3*   HEMATOCRIT % 35.8   PLATELETS Thousands/uL 310   NEUTROS PCT % 55   LYMPHS PCT % 36   MONOS PCT % 7   EOS PCT % 1     Results from last 7 days   Lab Units 11/14/23  0459   SODIUM mmol/L 139   POTASSIUM mmol/L 3.4*   CHLORIDE mmol/L 104   CO2 mmol/L 27   BUN mg/dL 7   CREATININE mg/dL 0.65   ANION GAP mmol/L 8   CALCIUM mg/dL 8.7   ALBUMIN g/dL 3.7   TOTAL BILIRUBIN mg/dL 0.40   ALK PHOS U/L 41   ALT U/L 6*   AST U/L 10*   GLUCOSE RANDOM mg/dL 72     Results from last 7 days   Lab Units 11/14/23  0459   INR  1.18             Results from last 7 days   Lab Units 11/14/23  0600 11/13/23  1404   LACTIC ACID mmol/L  --  0.9   PROCALCITONIN ng/ml <0.05  --            * I Have Reviewed All Lab Data Listed Above. * Additional Pertinent Lab Tests Reviewed: All Labs Within Last 24 Hours Reviewed    Imaging:    CT abdomen pelvis with contrast   Final Result   No acute findings. Workstation performed: [id number]                 Imaging Personally Reviewed by Myself Includes:  I have reviewed all imaging report during this hospitalization. Recent Cultures (last 7 days):           Last 24 Hours Medication List:   Current Facility-Administered Medications   Medication Dose Route Frequency Provider Last Rate    acetaminophen  650 mg Oral Q6H PRN Jeffrey Mcdaniels MD      albuterol  2 puff Inhalation Q4H PRN Ferrell Boas, MD      cefTRIAXone  1,000 mg Intravenous Q24H Ferrell Boas, MD      fluticasone  1 puff Inhalation Daily Ferrell Boas, MD      HYDROmorphone  0.5 mg Intravenous Q4H PRN Ferrell Boas, MD      nicotine  1 patch Transdermal Daily Jeffrey Mcdaniels MD      promethazine  25 mg Intravenous Q4H PRN Mel Gonzalez DO      sodium chloride  125 mL/hr Intravenous Continuous Ferrell Boas,  mL/hr (11/14/23 0132)        Today, Patient Was Seen By: Ferrell Boas, MD    ** Please Note: Dictation voice to text software may have been used in the creation of this document.  **

## 2023-11-14 NOTE — PLAN OF CARE
Problem: PAIN - ADULT  Goal: Verbalizes/displays adequate comfort level or baseline comfort level  Description: Interventions:  - Encourage patient to monitor pain and request assistance  - Assess pain using appropriate pain scale  - Administer analgesics based on type and severity of pain and evaluate response  - Implement non-pharmacological measures as appropriate and evaluate response  - Consider cultural and social influences on pain and pain management  - Notify physician/advanced practitioner if interventions unsuccessful or patient reports new pain  Outcome: Progressing     Problem: INFECTION - ADULT  Goal: Absence or prevention of progression during hospitalization  Description: INTERVENTIONS:  - Assess and monitor for signs and symptoms of infection  - Monitor lab/diagnostic results  - Monitor all insertion sites, i.e. indwelling lines, tubes, and drains  - Monitor endotracheal if appropriate and nasal secretions for changes in amount and color  - [place] appropriate cooling/warming therapies per order  - Administer medications as ordered  - Instruct and encourage patient and family to use good hand hygiene technique  - Identify and instruct in appropriate isolation precautions for identified infection/condition  Outcome: Progressing     Problem: SAFETY ADULT  Goal: Patient will remain free of falls  Description: INTERVENTIONS:  - Educate patient/family on patient safety including physical limitations  - Instruct patient to call for assistance with activity   - Consult OT/PT to assist with strengthening/mobility   - Keep Call bell within reach  - Keep bed low and locked with side rails adjusted as appropriate  - Keep care items and personal belongings within reach  - Initiate and maintain comfort rounds  - Make Fall Risk Sign visible to staff  - Offer Toileting every 2 Hours, in advance of need  - Initiate/Maintain bed alarm  - Obtain necessary fall risk management equipment:   - Apply yellow socks and bracelet for high fall risk patients  - Consider moving patient to room near nurses station  Outcome: Progressing  Goal: Maintain or return to baseline ADL function  Description: INTERVENTIONS:  -  Assess patient's ability to carry out ADLs; assess patient's baseline for ADL function and identify physical deficits which impact ability to perform ADLs (bathing, care of mouth/teeth, toileting, grooming, dressing, etc.)  - Assess/evaluate cause of self-care deficits   - Assess range of motion  - Assess patient's mobility; develop plan if impaired  - Assess patient's need for assistive devices and provide as appropriate  - Encourage maximum independence but intervene and supervise when necessary  - Involve family in performance of ADLs  - Assess for home care needs following discharge   - Consider OT consult to assist with ADL evaluation and planning for discharge  - Provide patient education as appropriate  Outcome: Progressing  Goal: Maintains/Returns to pre admission functional level  Description: INTERVENTIONS:  - Perform BMAT or MOVE assessment daily.   - Set and communicate daily mobility goal to care team and patient/family/caregiver. - Collaborate with rehabilitation services on mobility goals if consulted  - Perform Range of Motion 3 times a day. - Reposition patient every 2 hours.   - Dangle patient 3 times a day  - Stand patient 3 times a day  - Ambulate patient 3 times a day  - Out of bed to chair 3 times a day   - Out of bed for meals 3 times a day  - Out of bed for toileting  - Record patient progress and toleration of activity level   Outcome: Progressing     Problem: DISCHARGE PLANNING  Goal: Discharge to home or other facility with appropriate resources  Description: INTERVENTIONS:  - Identify barriers to discharge w/patient and caregiver  - Arrange for needed discharge resources and transportation as appropriate  - Identify discharge learning needs (meds, wound care, etc.)  - Arrange for interpretive services to assist at discharge as needed  - Refer to Case Management Department for coordinating discharge planning if the patient needs post-hospital services based on physician/advanced practitioner order or complex needs related to functional status, cognitive ability, or social support system  Outcome: Progressing     Problem: Knowledge Deficit  Goal: Patient/family/caregiver demonstrates understanding of disease process, treatment plan, medications, and discharge instructions  Description: Complete learning assessment and assess knowledge base.   Interventions:  - Provide teaching at level of understanding  - Provide teaching via preferred learning methods  Outcome: Progressing

## 2023-11-14 NOTE — UTILIZATION REVIEW
Initial Clinical Review    Admission: Date/Time/Statement:     OBSERVATION 23 CHANGED TO INPATIENT 23 DUE TO PERSISTENT VOMITING, IV FLUIDS, IV ANTIEMETICS, IV ANTIBIOTICS      Admission Orders (From admission, onward)       Ordered        23 0946  Inpatient Admission  Once            23 1614  Place in Observation  Once                            ED Arrival Information       Expected   -    Arrival   2023 13:29    Acuity   Urgent              Means of arrival   Walk-In    Escorted by   Family Member    Service   Hospitalist    Admission type   Emergency              Arrival complaint   abdominal pain, vomiting             Chief Complaint   Patient presents with    Abdominal Pain     Patient reports abdominal pain, was seen here Saturday for the same. Felt better after her discharge, + nausea and vomiting. Initial Presentation: 34 y.o. female presents to ed from home for evaluation and treatment of abdominal pain, nausea and vomiting persisting for 3 days. She was evaluated by GI as an outpatient and is scheduled for EGD tomorrow . Clinical assessment significant for abdominal tenderness, pain 10/10, persistent vomiting in ed. UA: mod bacteria, THC positive, VB.459 / 35.7 / 37 / 24.8, WBC 13.89, positive H Pylori initially treated with iv multi electrolyte 1L bolus, iv ceftriaxone, iv zofran x2, iv dilaudid x1. Admit to observation for intractable nausea and vomiting, cystitis. Date: 23    Day 2: observation changed to inpatient   Patient reports pain 9/10. Received iv mso4 at [address] and [id number]. Potassium decreased to 3.4. replete with iv Kcl 20 meq. Continue to monitor electrolytes. Patient reports persistent nausea/ vomiting. Last event last night 2330. Received iv phenergan at 1000 today. Continue iv fluids 125/hr. Remains NPO. GI consulted. EKG ordered for report of chest pain. Check orthostatic vital signs. Continue  iv ceftriaxone for cystitis. Follow up urine culture. Consult GI : suspect cyclical hyperemesis syndrome related to chronic cannabis use.  + H Pylori. Plan EGD. Keep NPO, continue iv fluids. Collect LFTs, collect stool to rule out GI bleed. ED Triage Vitals [11/13/23 1342]   97.8 °F (36.6 °C) 74 18 130/73 100 %      Temporal Monitor         10 - Worst Possible Pain          11/13/23 83.7 kg (184 lb 8.4 oz)     Additional Vital Signs:     ate/Time Temp Pulse Resp BP MAP (mmHg) SpO2 O2 Device   11/14/23 06:30:34 98.3 °F (36.8 °C) 74 -- 105/80 88 98 % --   11/13/23 21:49:05 98.8 °F (37.1 °C) 71 17 108/60 76 97 % None (Room air)   11/13/23 19:21:26 98.9 °F (37.2 °C) 80 -- 111/65 80 98 % --   11/13/23 16:33:08 98.6 °F (37 °C) 73 18 112/64 80 98 % --   11/13/23 1342 97.8 °F (36.6 °C) 74 18 130/73 96 100 % None (Room air)         Pertinent Labs/Diagnostic Test Results:     CT abdomen pelvis with contrast   Final (11/13 1524)   No acute findings.         Results from last 7 days   Lab Units 11/13/23  1352   SARS-COV-2  Negative     Results from last 7 days   Lab Units 11/14/23  0459 11/13/23  1404 11/08/23  1111   WBC Thousand/uL 10.05 13.89* 11.49*   HEMOGLOBIN g/dL 11.3* 13.7 14.0   HEMATOCRIT % 35.8 42.3 44.2   PLATELETS Thousands/uL 310 353 402*   NEUTROS ABS Thousands/µL 5.41 11.13* 7.86*         Results from last 7 days   Lab Units 11/14/23  0459 11/13/23  1404 11/08/23  1111   SODIUM mmol/L 139 136 139   POTASSIUM mmol/L 3.4* 4.0 3.6   CHLORIDE mmol/L 104 98 100   CO2 mmol/L 27 25 30   ANION GAP mmol/L 8 13 9   BUN mg/dL 7 13 13   CREATININE mg/dL 0.65 0.69 0.74   EGFR ml/min/1.73sq m 118 116 108   CALCIUM mg/dL 8.7 9.8 9.8   MAGNESIUM mg/dL 2.2  --   --    PHOSPHORUS mg/dL 3.5  --   --      Results from last 7 days   Lab Units 11/14/23  0459 11/13/23  1404 11/08/23  1111   AST U/L 10* 21 13   ALT U/L 6* 9 10   ALK PHOS U/L 41 51 58   TOTAL PROTEIN g/dL 6.0* 7.9 8.0   ALBUMIN g/dL 3.7 4.6 4.7   TOTAL BILIRUBIN mg/dL 0.40 0.51 0.58         Results from last 7 days   Lab Units 11/14/23  0459 11/13/23  1404   GLUCOSE RANDOM mg/dL 72 95       Results from last 7 days   Lab Units 11/13/23  1404   PH MICHELLE  7.459*   PCO2 MICHELLE mm Hg 35.7*   PO2 MICHELLE mm Hg 37.1   HCO3 MICHELLE mmol/L 24.8   BASE EXC MICHELLE mmol/L 1.3   O2 CONTENT MICHELLE ml/dL 14.7   O2 HGB, VENOUS % 72.6           Results from last 7 days   Lab Units 11/14/23  0459   PROTIME seconds 14.9*   INR  1.18   PTT seconds 30         Results from last 7 days   Lab Units 11/14/23  0600   PROCALCITONIN ng/ml <0.05     Results from last 7 days   Lab Units 11/13/23  1404   LACTIC ACID mmol/L 0.9       Results from last 7 days   Lab Units 11/13/23  1404   LIPASE u/L 26     Results from last 7 days   Lab Units 11/08/23  1111   CRP mg/L 4.1*     Results from last 7 days   Lab Units 11/13/23  1423   CLARITY UA  Clear   COLOR UA  Yellow   SPEC GRAV UA  >=1.030   PH UA  6.5   GLUCOSE UA mg/dl Negative   KETONES UA mg/dl >=80 (3+)*   BLOOD UA  Negative   PROTEIN UA mg/dl Trace*   NITRITE UA  Negative   BILIRUBIN UA  Small*   UROBILINOGEN UA E.U./dl 0.2   LEUKOCYTES UA  Trace*   WBC UA /hpf 4-10*   RBC UA /hpf 0-5   BACTERIA UA /hpf Moderate*   EPITHELIAL CELLS WET PREP /hpf Occasional   MUCUS THREADS  Occasional*     Results from last 7 days   Lab Units 11/13/23  1352   INFLUENZA A PCR  Negative   INFLUENZA B PCR  Negative   RSV PCR  Negative         Results from last 7 days   Lab Units 11/13/23  1423   AMPH/METH  Negative   BARBITURATE UR  Negative   BENZODIAZEPINE UR  Negative   COCAINE UR  Negative   METHADONE URINE  Negative   OPIATE UR  Negative   PCP UR  Negative   THC UR  Positive*         ED Treatment:   Medication Administration from 11/13/2023 1329 to 11/13/2023 1629         Date/Time Order Dose Route Action     11/13/2023 1404 EST multi-electrolyte (ISOLYTE-S PH 7.4) bolus 2,000 mL 2,000 mL Intravenous New Bag     11/13/2023 1404 EST ondansetron (ZOFRAN) injection 4 mg 4 mg Intravenous Given 11/13/2023 1509 EST cefTRIAXone (ROCEPHIN) IVPB (premix in dextrose) 1,000 mg 50 mL 1,000 mg Intravenous New Bag     11/13/2023 1610 EST HYDROmorphone (DILAUDID) injection 1 mg 1 mg Intravenous Given     11/13/2023 1610 EST ondansetron (ZOFRAN) injection 4 mg 4 mg Intravenous Given          Past Medical History:   Diagnosis Date    Asthma     Closed displaced fracture of acromial end of right clavicle with routine healing 08/14/2018     Present on Admission:   Hepatic steatosis   Intractable nausea and vomiting   Positive H. pylori test   Acute cystitis without hematuria   Leukocytosis   Mild persistent asthma without complication      Admitting Diagnosis:     Vomiting [R11.10]  UTI (urinary tract infection) [N39.0]  Abdominal pain [R10.9]    Age/Sex: 34 y.o. female      Scheduled Medications:    cefTRIAXone, 1,000 mg, Intravenous, Q24H  fluticasone, 1 puff, Inhalation, Daily  nicotine, 1 patch, Transdermal, Daily  potassium chloride, 20 mEq, Intravenous, Q2H      Continuous IV Infusions:  sodium chloride, 125 mL/hr, Intravenous, Continuous      PRN Meds:  acetaminophen, 650 mg, Oral, Q6H PRN  albuterol, 2 puff, Inhalation, Q4H PRN  HYDROmorphone, 0.5 mg, Intravenous, Q4H PRN  promethazine, 25 mg, Intravenous, Q4H PRN        IP CONSULT TO GASTROENTEROLOGY    Network Utilization Review Department  ATTENTION: Please call with any questions or concerns to [phone] and carefully listen to the prompts so that you are directed to the right person. All voicemails are confidential.   For Discharge needs, contact Care Management DC Support Team at [phone] opt. [phone]  Send all requests for admission clinical reviews, approved or denied determinations and any other requests to dedicated fax number below belonging to the campus where the patient is receiving treatment.  List of dedicated fax numbers for the Facilities:  FACILITY NAME [address] DENIALS (Administrative/Medical Necessity) [phone] DISCHARGE SUPPORT TEAM (NETWORK) [address] [facility] (Maternity/NICU/Pediatrics) [address] [address] [facility] [phone] [address] [address] [facility] [address] [facility] [address] [facility] [phone]

## 2023-11-14 NOTE — CONSULTS
Consultation - [address]  [facility]  Vita Rodarte 34 y.o. female MRN: [id number]  Unit/Bed#: 425-01 Encounter: [id number]        Inpatient consult to gastroenterology  Consult performed by: Sherell Primrose, CRNP  Consult ordered by: Loraine Maurer MD          Reason for Consult / Principal Problem:     Abdominal Pain  Nausea/Vomiting      ASSESSMENT AND PLAN:     The patient is a 34 y.o. female who is being seen for a consultation today for her continued abdominal pain, nausea, and vomiting. She is currently n.p.o. and was already scheduled for an EGD today with Dr. Carlos Vigil. Serology: (11/14/23): Potassium 3.4, AST 10, ALT 6, total protein 6.0, hemoglobin 11.3, PT/INR 14.9/1. 18.  Stool Studies: Needs to be collected. Exam:  Oral mucosa normal upon visual inspection, without any sores, lesions, or ulcerations. Sclera without icterus and benign. Lung sounds CTA b/l. Normal S1 & S2 upon exam. Abdomen soft, with moderate to significant tenderness noted on exam of the epigastric region and right greater than left upper quadrants, with guarding, but without rebound tenderness, with faint bowel sounds x4. No edema noted of the b/l lower extremities upon exam today. Skin is non-icteric. Abdominal Pain  Nausea/Vomiting  Diarrhea  Hepatic steatosis    At this point in time, although there is some suspicion that her symptoms are possibly part of a cyclical hyperemesis syndrome secondary to chronic cannabis use, we will proceed with the scheduled EGD to evaluate for any other underlying etiology that could explain her symptoms including but not limited to; H. pylori, ulcers, Jensen's esophagus, etc.  The patient was agreeable and verbalized understanding. I discussed the risks of procedure with the patient including, but not limited to: bleeding, infection, sore throat, and perforation. The patient gave verbal understanding and is agreeable to proceed. Continue n.p.o. for now.   Continue IV fluids as per primary team.  Continue to monitor LFTs. Continue serial abdominal exams. Continue to monitor stool output and evaluate for any overt GI bleeding. Continue antibiotics as per primary team.  Continue antiemetics as prescribed. Continue supportive care. GI will continue to follow. ______________________________________________________________________    HPI: Patient is a 34 y.o. female with a past medical history of colitis, asthma, and cyclical nausea and vomiting, who presented to the ER on 2023 for worsening abdominal pain, nausea, and vomiting. The patient denies any bloody emesis and reports that her last bowel movement was 3 days ago and that it was diarrhea/loose without any melena or blood. However, the patient does report that a few weeks ago she was having dark or black stools, but that has since resided. The patient continues to report moderate to severe epigastric pain, burning, nausea, and vomiting. The patient was already scheduled outpatient for an EGD today and has been n.p.o. since midnight. The patient denies any dysphagia. The patient denies any bloating, constipation, straining, melena or bloody stools. Last BM: 23. Flatus: No.    Serology Upon Admission: (23) WBCs 13.89, otherwise the rest of the blood work was WNL. 11/10/23: + H pylori stool study. O & P PENDING. Tobacco/Vapin pack/day. ETOH: Denied  Marijuana: Typically uses on a regular and daily basis several times throughout the day, but reports that her last use was 2 weeks ago. Illicit Drug Use: Denied    Meds: Ceftriaxone 1000 mg daily. NSAID Use: Denied. Imaging: (23) CT of the abdomen and pelvis with contrast: Normal.    Endoscopy History: EGD: (3/23/21): Normal. Path: Normal.      COLONOSCOPY: (23): 3 Polyps with 1 tubular adenoma and recommendation to proceed with a repeat colonoscopy in 5 years.     DUE: 28    REVIEW OF SYSTEMS:    CONSTITUTIONAL: Denies any fever, chills, rigors, and weight loss. HEENT: No earache or tinnitus. Denies hearing loss or visual disturbances. CARDIOVASCULAR: No chest pain or palpitations. RESPIRATORY: Denies any cough, hemoptysis, shortness of breath or dyspnea on exertion. GASTROINTESTINAL: As noted in the History of Present Illness. GENITOURINARY: No problems with urination. Denies any hematuria or dysuria. NEUROLOGIC: No dizziness or vertigo, denies headaches. MUSCULOSKELETAL: Denies any muscle or joint pain. SKIN: Denies skin rashes or itching. ENDOCRINE: Denies excessive thirst. Denies intolerance to heat or cold. PSYCHOSOCIAL: Denies depression or anxiety. Denies any recent memory loss. Historical Information   Past Medical History:   Diagnosis Date    Asthma     Closed displaced fracture of acromial end of right clavicle with routine healing 08/14/2018     Past Surgical History:   Procedure Laterality Date    CYST REMOVAL      SHOULDER SURGERY      R shoulder    TONSILLECTOMY       Social History   Social History     Substance and Sexual Activity   Alcohol Use Not Currently    Comment: socailly     Social History     Substance and Sexual Activity   Drug Use Yes    Types: Marijuana     Social History     Tobacco Use   Smoking Status Every Day    Packs/day: 0.25    Types: Cigarettes   Smokeless Tobacco Never     History reviewed. No pertinent family history.     Meds/Allergies     Medications Prior to Admission   Medication    acetaminophen (TYLENOL) 325 mg tablet    famotidine (PEPCID) 40 MG tablet    hyoscyamine (LEVSIN/SL) 0.125 mg SL tablet    ondansetron (Zofran ODT) 4 mg disintegrating tablet    albuterol (ProAir HFA) 90 mcg/act inhaler    Arthritis Pain Relief 650 MG CR tablet    Multiple Vitamin (multivitamin) capsule     Current Facility-Administered Medications   Medication Dose Route Frequency    acetaminophen (TYLENOL) tablet 650 mg  650 mg Oral Q6H PRN    albuterol (PROVENTIL HFA,VENTOLIN HFA) inhaler 2 puff  2 puff Inhalation Q4H PRN    cefTRIAXone (ROCEPHIN) IVPB (premix in dextrose) 1,000 mg 50 mL  1,000 mg Intravenous Q24H    fluticasone (ARNUITY ELLIPTA) 100 MCG/ACT inhaler 1 puff  1 puff Inhalation Daily    morphine injection 2 mg  2 mg Intravenous Q4H PRN    nicotine (NICODERM CQ) 21 mg/24 hr TD 24 hr patch 1 patch  1 patch Transdermal Daily    ondansetron (ZOFRAN) injection 4 mg  4 mg Intravenous Q4H PRN    potassium chloride 20 mEq IVPB (premix)  20 mEq Intravenous Q2H    sodium chloride 0.9 % infusion  125 mL/hr Intravenous Continuous       No Known Allergies        Objective     Blood pressure 105/80, pulse 74, temperature 98.3 °F (36.8 °C), resp. rate 17, height 5' 4" (1.626 m), weight 83.7 kg (184 lb 8.4 oz), SpO2 98 %. Body mass index is 31.67 kg/m². Intake/Output Summary (Last 24 hours) at 11/14/2023 0745  Last data filed at 11/14/2023 0557  Gross per 24 hour   Intake 50 ml   Output 400 ml   Net -350 ml         PHYSICAL EXAM:      General Appearance:   Alert, cooperative, no distress   HEENT:   Normocephalic, atraumatic, anicteric. Neck:  Supple, symmetrical, trachea midline   Lungs:   Clear to auscultation bilaterally; no rales, rhonchi or wheezing; respirations unlabored    Heart[de-identified]   Regular rate and rhythm; no murmur, rub, or gallop.    Abdomen:   Soft, non-tender, non-distended; normal bowel sounds; no masses, no organomegaly    Genitalia:   Deferred    Rectal:   Deferred    Extremities:  No cyanosis, clubbing or edema    Pulses:  2+ and symmetric all extremities    Skin:  No jaundice, rashes, or lesions    Lymph nodes:  No palpable cervical lymphadenopathy        Lab Results:   Admission on 11/13/2023   Component Date Value    pH, Rupesh 11/13/2023 7.459 (H)     pCO2, Rupesh 11/13/2023 35.7 (L)     pO2, Rupesh 11/13/2023 37.1     HCO3, Rupesh 11/13/2023 24.8     Base Excess, Rupesh 11/13/2023 1.3     O2 Content, Rupesh 11/13/2023 14.7     O2 HGB, VENOUS 11/13/2023 72.6     WBC 11/13/2023 13.89 (H)     RBC 11/13/2023 4.93     Hemoglobin 11/13/2023 13.7     Hematocrit 11/13/2023 42.3     MCV 11/13/2023 86     MCH 11/13/2023 27.8     MCHC 11/13/2023 32.4     RDW 11/13/2023 14.1     MPV 11/13/2023 10.1     Platelets 91/16/4584 353     nRBC 11/13/2023 0     Neutrophils Relative 11/13/2023 81 (H)     Immat GRANS % 11/13/2023 0     Lymphocytes Relative 11/13/2023 13 (L)     Monocytes Relative 11/13/2023 5     Eosinophils Relative 11/13/2023 0     Basophils Relative 11/13/2023 1     Neutrophils Absolute 11/13/2023 11.13 (H)     Immature Grans Absolute 11/13/2023 0.05     Lymphocytes Absolute 11/13/2023 1.83     Monocytes Absolute 11/13/2023 0.74     Eosinophils Absolute 11/13/2023 0.05     Basophils Absolute 11/13/2023 0.09     Sodium 11/13/2023 136     Potassium 11/13/2023 4.0     Chloride 11/13/2023 98     CO2 11/13/2023 25     ANION GAP 11/13/2023 13     BUN 11/13/2023 13     Creatinine 11/13/2023 0.69     Glucose 11/13/2023 95     Calcium 11/13/2023 9.8     AST 11/13/2023 21     ALT 11/13/2023 9     Alkaline Phosphatase 11/13/2023 51     Total Protein 11/13/2023 7.9     Albumin 11/13/2023 4.6     Total Bilirubin 11/13/2023 0.51     eGFR 11/13/2023 116     Lipase 11/13/2023 26     Color, UA 11/13/2023 Yellow     Clarity, UA 11/13/2023 Clear     Specific Gravity, UA 11/13/2023 >=1.030     pH, UA 11/13/2023 6.5     Leukocytes, UA 11/13/2023 Trace (A)     Nitrite, UA 11/13/2023 Negative     Protein, UA 11/13/2023 Trace (A)     Glucose, UA 11/13/2023 Negative     Ketones, UA 11/13/2023 >=80 (3+) (A)     Urobilinogen, UA 11/13/2023 0.2     Bilirubin, UA 11/13/2023 Small (A)     Occult Blood, UA 11/13/2023 Negative     EXT Preg Test, Ur 11/13/2023 Negative     Control 11/13/2023 Valid     Amph/Meth UR 11/13/2023 Negative     Barbiturate Ur 11/13/2023 Negative     Benzodiazepine Urine 11/13/2023 Negative     Cocaine Urine 11/13/2023 Negative     Methadone Urine 11/13/2023 Negative Opiate Urine 11/13/2023 Negative     PCP Ur 11/13/2023 Negative     THC Urine 11/13/2023 Positive (A)     Oxycodone Urine 11/13/2023 Negative     SARS-CoV-2 11/13/2023 Negative     INFLUENZA A PCR 11/13/2023 Negative     INFLUENZA B PCR 11/13/2023 Negative     RSV PCR 11/13/2023 Negative     LACTIC ACID 11/13/2023 0.9     RBC, UA 11/13/2023 0-5     WBC, UA 11/13/2023 4-10 (A)     Epithelial Cells 11/13/2023 Occasional     Bacteria, UA 11/13/2023 Moderate (A)     WBC Clumps 11/13/2023 OCCASSIONALLY OBSERVED     OTHER OBSERVATIONS 11/13/2023 Transitional Epithelial Cells     MUCUS THREADS 11/13/2023 Occasional (A)     PTT 11/14/2023 30     Protime 11/14/2023 14.9 (H)     INR 11/14/2023 1.18     Phosphorus 11/14/2023 3.5     Magnesium 11/14/2023 2.2     Sodium 11/14/2023 139     Potassium 11/14/2023 3.4 (L)     Chloride 11/14/2023 104     CO2 11/14/2023 27     ANION GAP 11/14/2023 8     BUN 11/14/2023 7     Creatinine 11/14/2023 0.65     Glucose 11/14/2023 72     Glucose, Fasting 11/14/2023 72     Calcium 11/14/2023 8.7     AST 11/14/2023 10 (L)     ALT 11/14/2023 6 (L)     Alkaline Phosphatase 11/14/2023 41     Total Protein 11/14/2023 6.0 (L)     Albumin 11/14/2023 3.7     Total Bilirubin 11/14/2023 0.40     eGFR 11/14/2023 118     WBC 11/14/2023 10.05     RBC 11/14/2023 4.11     Hemoglobin 11/14/2023 11.3 (L)     Hematocrit 11/14/2023 35.8     MCV 11/14/2023 87     MCH 11/14/2023 27.5     MCHC 11/14/2023 31.6     RDW 11/14/2023 13.9     MPV 11/14/2023 9.8     Platelets 94/84/7990 310     nRBC 11/14/2023 0     Neutrophils Relative 11/14/2023 55     Immat GRANS % 11/14/2023 0     Lymphocytes Relative 11/14/2023 36     Monocytes Relative 11/14/2023 7     Eosinophils Relative 11/14/2023 1     Basophils Relative 11/14/2023 1     Neutrophils Absolute 11/14/2023 5.41     Immature Grans Absolute 11/14/2023 0.03     Lymphocytes Absolute 11/14/2023 3.65     Monocytes Absolute 11/14/2023 0.74     Eosinophils Absolute 11/14/2023 0.13     Basophils Absolute 11/14/2023 0.09     Procalcitonin 11/14/2023 <0.05        Imaging Studies: I have personally reviewed pertinent imaging studies.

## 2023-11-14 NOTE — PLAN OF CARE
Problem: PAIN - ADULT  Goal: Verbalizes/displays adequate comfort level or baseline comfort level  Description: Interventions:  - Encourage patient to monitor pain and request assistance  - Assess pain using appropriate pain scale  - Administer analgesics based on type and severity of pain and evaluate response  - Implement non-pharmacological measures as appropriate and evaluate response  - Consider cultural and social influences on pain and pain management  - Notify physician/advanced practitioner if interventions unsuccessful or patient reports new pain  Outcome: Progressing     Problem: INFECTION - ADULT  Goal: Absence or prevention of progression during hospitalization  Description: INTERVENTIONS:  - Assess and monitor for signs and symptoms of infection  - Monitor lab/diagnostic results  - Monitor all insertion sites, i.e. indwelling lines, tubes, and drains  - Monitor endotracheal if appropriate and nasal secretions for changes in amount and color  - [place] appropriate cooling/warming therapies per order  - Administer medications as ordered  - Instruct and encourage patient and family to use good hand hygiene technique  - Identify and instruct in appropriate isolation precautions for identified infection/condition  Outcome: Progressing     Problem: SAFETY ADULT  Goal: Patient will remain free of falls  Description: INTERVENTIONS:  - Educate patient/family on patient safety including physical limitations  - Instruct patient to call for assistance with activity   - Consult OT/PT to assist with strengthening/mobility   - Keep Call bell within reach  - Keep bed low and locked with side rails adjusted as appropriate  - Keep care items and personal belongings within reach  - Initiate and maintain comfort rounds  - Make Fall Risk Sign visible to staff  - Offer Toileting every 3 Hours, in advance of need  - Initiate/Maintain bed alarm  - Obtain necessary fall risk management equipment: yellow socks  - Apply yellow socks and bracelet for high fall risk patients  - Consider moving patient to room near nurses station  Outcome: Progressing  Goal: Maintain or return to baseline ADL function  Description: INTERVENTIONS:  -  Assess patient's ability to carry out ADLs; assess patient's baseline for ADL function and identify physical deficits which impact ability to perform ADLs (bathing, care of mouth/teeth, toileting, grooming, dressing, etc.)  - Assess/evaluate cause of self-care deficits   - Assess range of motion  - Assess patient's mobility; develop plan if impaired  - Assess patient's need for assistive devices and provide as appropriate  - Encourage maximum independence but intervene and supervise when necessary  - Involve family in performance of ADLs  - Assess for home care needs following discharge   - Consider OT consult to assist with ADL evaluation and planning for discharge  - Provide patient education as appropriate  Outcome: Progressing  Goal: Maintains/Returns to pre admission functional level  Description: INTERVENTIONS:  - Perform BMAT or MOVE assessment daily.   - Set and communicate daily mobility goal to care team and patient/family/caregiver. - Collaborate with rehabilitation services on mobility goals if consulted  - Perform Range of Motion 3 times a day. - Reposition patient every 3 hours.   - Dangle patient 3 times a day  - Stand patient 3 times a day  - Ambulate patient 3 times a day  - Out of bed to chair 3 times a day   - Out of bed for meals 3 times a day  - Out of bed for toileting  - Record patient progress and toleration of activity level   Outcome: Progressing     Problem: DISCHARGE PLANNING  Goal: Discharge to home or other facility with appropriate resources  Description: INTERVENTIONS:  - Identify barriers to discharge w/patient and caregiver  - Arrange for needed discharge resources and transportation as appropriate  - Identify discharge learning needs (meds, wound care, etc.)  - Arrange for interpretive services to assist at discharge as needed  - Refer to Case Management Department for coordinating discharge planning if the patient needs post-hospital services based on physician/advanced practitioner order or complex needs related to functional status, cognitive ability, or social support system  Outcome: Progressing     Problem: Knowledge Deficit  Goal: Patient/family/caregiver demonstrates understanding of disease process, treatment plan, medications, and discharge instructions  Description: Complete learning assessment and assess knowledge base. Interventions:  - Provide teaching at level of understanding  - Provide teaching via preferred learning methods  Outcome: Progressing     Problem: Nutrition/Hydration-ADULT  Goal: Nutrient/Hydration intake appropriate for improving, restoring or maintaining nutritional needs  Description: Monitor and assess patient's nutrition/hydration status for malnutrition. Collaborate with interdisciplinary team and initiate plan and interventions as ordered. Monitor patient's weight and dietary intake as ordered or per policy. Utilize nutrition screening tool and intervene as necessary. Determine patient's food preferences and provide high-protein, high-caloric foods as appropriate.      INTERVENTIONS:  - Monitor oral intake, urinary output, labs, and treatment plans  - Assess nutrition and hydration status and recommend course of action  - Evaluate amount of meals eaten  - Assist patient with eating if necessary   - Allow adequate time for meals  - Recommend/ encourage appropriate diets, oral nutritional supplements, and vitamin/mineral supplements  - Order, calculate, and assess calorie counts as needed  - Recommend, monitor, and adjust tube feedings and TPN/PPN based on assessed needs  - Assess need for intravenous fluids  - Provide specific nutrition/hydration education as appropriate  - Include patient/family/caregiver in decisions related to nutrition  Outcome: Progressing

## 2023-11-14 NOTE — CASE MANAGEMENT
Case Management Assessment    Patient name Arabella Simon  Location /635-13 MRN [id number]  : 1990 Date 2023       Current Admission Date: 2023  Current Admission Diagnosis:Intractable nausea and vomiting   Patient Active Problem List    Diagnosis Date Noted    Hepatic steatosis 2023    Positive H. pylori test 2023    Acute cystitis without hematuria 2023    Mild persistent asthma without complication     Intractable nausea and vomiting 2021    Intractable abdominal pain 2021    Liver lesion 2021    Ventricular ectopy 2021    Leukocytosis 2021    Tobacco use 2018      LOS (days): 0  Geometric Mean LOS (GMLOS) (days):   Days to GMLOS:     OBJECTIVE:              Current admission status: Observation       Preferred Pharmacy:   [address] [place] [id number]  Phone: [phone] Fax: [phone]    Primary Care Provider: Misti Helms DO    Primary Insurance:   Secondary Insurance:     ASSESSMENT:  [id number] [facility], [address] [facility] - Mother   Primary Phone: [phone] (Home)                 Advance Directives  Does patient have a [address]?: No  Was patient offered paperwork?: Yes (declined)  Does patient currently have a Health Care decision maker?: Yes, please see Health Care Proxy section  Does patient have Advance Directives?: No  Was patient offered paperwork?: Yes (declined)  Primary Contact: Lanny Nicole         Readmission Root Cause  30 Day Readmission: No    Patient Information  Admitted from[de-identified] Home  Mental Status: Alert  During Assessment patient was accompanied by: Not accompanied during assessment  Assessment information provided by[de-identified] Patient  Primary Caregiver: Self  Support Systems: Self, Parent  [place] of Residence: [address] [place] [address] do you live in?: [address] [facility] access options.  Select all that apply.: Stairs  Number of steps to enter home.: 3  Do the steps have railings?: Yes  Type of Current Residence: 3 story home  Upon entering residence, is there a bedroom on the main floor (no further steps)?: No  A bedroom is located on the following floor levels of residence (select all that apply):: 3rd Floor  Upon entering residence, is there a bathroom on the main floor (no further steps)?: No  Indicate which floors of current residence have a bathroom (select all the apply):: 2nd Floor  Number of steps to 2nd floor from main floor: One Flight  Number of steps to 3rd floor from main floor: Two Flights  In the last 12 months, was there a time when you were not able to pay the mortgage or rent on time?: No  In the last 12 months, how many places have you lived?: 1  In the last 12 months, was there a time when you did not have a steady place to sleep or slept in a shelter (including now)?: No  Homeless/housing insecurity resource given?: N/A  Living Arrangements: Lives w/ Parent(s)  Is patient a ?: No    Activities of Daily Living Prior to Admission  Functional Status: Independent  Completes ADLs independently?: Yes  Ambulates independently?: Yes  Does patient use assisted devices?: No  Does patient currently own DME?: No  Does patient have a history of Outpatient Therapy (PT/OT)?: Yes  Does the patient have a history of Short-Term Rehab?: No  Does patient have a history of HHC?: Yes (agency unknown, had wound care done with hhc in past)         Patient Information Continued  Income Source: Unemployed ([place] provided with [facility] for available resources since pt no employed)  Does patient have prescription coverage?: Yes  Within the past 12 months, you worried that your food would run out before you got the money to buy more.: Never true  Within the past 12 months, the food you bought just didn't last and you didn't have money to get more.: Never true  Food insecurity resource given?: N/A  Does patient receive dialysis treatments?: No  Does patient have a history of substance abuse?: Yes  Historical substance use preference: Marijuana  History of Withdrawal Symptoms: Denies past symptoms  Is patient currently in treatment for substance abuse?: No. Patient declined treatment information. Does patient have a history of Mental Health Diagnosis?: No         Means of Transportation  Means of Transport to Appts[de-identified] Drives Self  In the past 12 months, has lack of transportation kept you from medical appointments or from getting medications?: No  In the past 12 months, has lack of transportation kept you from meetings, work, or from getting things needed for daily living?: No  Was application for public transport provided?: N/A  Cm met with the patient to evaluate the patients prior function and living situation and any barriers to d/c and form a safe d/c plan. Cm also evaluated the patient for any services in the home or needs for services. CM also discussed with patient that their preferences will be taken into account/consideration. Pt admitted with nausea and vomiting. Was scheduled for endoscopy and will continue with that for today. GI consulted. No current discharge needs. Pt will discharge home with o/p follow up .

## 2023-11-14 NOTE — CASE MANAGEMENT
Case Management Progress Note    Patient name Vera Hightower  Location /236-87 MRN [id number]  : 1990 Date 2023       LOS (days): 0  Geometric Mean LOS (GMLOS) (days):   Days to GMLOS:        OBJECTIVE:        Current admission status: Inpatient  Preferred Pharmacy:   [address] [facility] [place] [facility] [place]  [facility] [place]  [facility] [address]  Phone: [phone] Fax: [phone]    Primary Care Provider: Dhara Sol DO    Primary Insurance:   Secondary Insurance:     PROGRESS NOTE:spoke with Dane Molina in registration and asked about scanned insurance card in system. She states they ran it in system and its showing ineffective. Call placed to [facility] and spoke with Venancio Crenshaw, pt plan has been terminated 23. Updated pt and pt mother regarding same. Pt mother states she already reapplied for it. I explained that I will email financial counselor to reach out to them to assist with hospital stay bill if insurance will not retro back. Verbalized understanding of same. Pt mother states they are waiting on results of EGD. CM to follow.

## 2023-11-14 NOTE — ANESTHESIA POSTPROCEDURE EVALUATION
Post-Op Assessment Note    CV Status:  Stable  Pain Score: 0    Pain management: adequate       Mental Status:  Alert and awake   Hydration Status:  Euvolemic   PONV Controlled:  Controlled   Airway Patency:  Patent     Post Op Vitals Reviewed: Yes    No anethesia notable event occurred.     Staff: Anesthesiologist, CRNA               BP 94/51 (11/14/23 1506)    Temp      Pulse 100 (11/14/23 1506)   Resp 20 (11/14/23 1506)    SpO2 100 % (11/14/23 1506)

## 2023-11-14 NOTE — ANESTHESIA PREPROCEDURE EVALUATION
Procedure:  EGD    Relevant Problems   CARDIO   (+) Ventricular ectopy      GI/HEPATIC   (+) Hepatic steatosis   (+) Liver lesion      PULMONARY   (+) Mild persistent asthma without complication        Physical Exam    Airway    Mallampati score: I  TM Distance: >3 FB       Dental   No notable dental hx     Cardiovascular      Pulmonary      Other Findings    Anesthesia Plan  ASA Score- 2     Anesthesia Type- IV sedation with anesthesia with ASA Monitors. Additional Monitors:     Airway Plan:            Plan Factors-Exercise tolerance (METS): >4 METS. Chart reviewed. Existing labs reviewed. Patient summary reviewed. Patient is a current smoker. Patient instructed to abstain from smoking on day of procedure. Patient did not smoke on day of surgery. There is medical exclusion for perioperative obstructive sleep apnea risk education. Induction- intravenous. Postoperative Plan-     Informed Consent- Anesthetic plan and risks discussed with patient. I personally reviewed this patient with the CRNA. Discussed and agreed on the Anesthesia Plan with the CRNA. Atif Valle
- - -

## 2023-11-15 ENCOUNTER — TELEPHONE (OUTPATIENT)
Dept: GASTROENTEROLOGY | Facility: CLINIC | Age: 33
End: 2023-11-15

## 2023-11-15 DIAGNOSIS — A04.8 POSITIVE H. PYLORI TEST: Primary | ICD-10-CM

## 2023-11-15 LAB
ALBUMIN SERPL BCP-MCNC: 3.9 G/DL (ref 3.5–5)
ALP SERPL-CCNC: 46 U/L (ref 34–104)
ALT SERPL W P-5'-P-CCNC: 5 U/L (ref 7–52)
ANION GAP SERPL CALCULATED.3IONS-SCNC: 11 MMOL/L
APTT PPP: 30 SECONDS (ref 23–37)
AST SERPL W P-5'-P-CCNC: 9 U/L (ref 13–39)
BASOPHILS # BLD AUTO: 0.05 THOUSANDS/ÂΜL (ref 0–0.1)
BASOPHILS NFR BLD AUTO: 1 % (ref 0–1)
BILIRUB SERPL-MCNC: 0.48 MG/DL (ref 0.2–1)
BUN SERPL-MCNC: 4 MG/DL (ref 5–25)
CALCIUM SERPL-MCNC: 9.5 MG/DL (ref 8.4–10.2)
CHLORIDE SERPL-SCNC: 105 MMOL/L (ref 96–108)
CO2 SERPL-SCNC: 23 MMOL/L (ref 21–32)
CREAT SERPL-MCNC: 0.61 MG/DL (ref 0.6–1.3)
EOSINOPHIL # BLD AUTO: 0.04 THOUSAND/ÂΜL (ref 0–0.61)
EOSINOPHIL NFR BLD AUTO: 0 % (ref 0–6)
ERYTHROCYTE [DISTWIDTH] IN BLOOD BY AUTOMATED COUNT: 13.7 % (ref 11.6–15.1)
GFR SERPL CREATININE-BSD FRML MDRD: 121 ML/MIN/1.73SQ M
GLUCOSE SERPL-MCNC: 83 MG/DL (ref 65–140)
HCT VFR BLD AUTO: 38 % (ref 34.8–46.1)
HGB BLD-MCNC: 12.1 G/DL (ref 11.5–15.4)
IMM GRANULOCYTES # BLD AUTO: 0.03 THOUSAND/UL (ref 0–0.2)
IMM GRANULOCYTES NFR BLD AUTO: 0 % (ref 0–2)
INR PPP: 1.1 (ref 0.84–1.19)
LYMPHOCYTES # BLD AUTO: 2.67 THOUSANDS/ÂΜL (ref 0.6–4.47)
LYMPHOCYTES NFR BLD AUTO: 27 % (ref 14–44)
MAGNESIUM SERPL-MCNC: 2 MG/DL (ref 1.9–2.7)
MCH RBC QN AUTO: 27.4 PG (ref 26.8–34.3)
MCHC RBC AUTO-ENTMCNC: 31.8 G/DL (ref 31.4–37.4)
MCV RBC AUTO: 86 FL (ref 82–98)
MONOCYTES # BLD AUTO: 0.71 THOUSAND/ÂΜL (ref 0.17–1.22)
MONOCYTES NFR BLD AUTO: 7 % (ref 4–12)
NEUTROPHILS # BLD AUTO: 6.38 THOUSANDS/ÂΜL (ref 1.85–7.62)
NEUTS SEG NFR BLD AUTO: 65 % (ref 43–75)
NRBC BLD AUTO-RTO: 0 /100 WBCS
PHOSPHATE SERPL-MCNC: 3.9 MG/DL (ref 2.7–4.5)
PLATELET # BLD AUTO: 342 THOUSANDS/UL (ref 149–390)
PMV BLD AUTO: 9.5 FL (ref 8.9–12.7)
POTASSIUM SERPL-SCNC: 3.5 MMOL/L (ref 3.5–5.3)
PROCALCITONIN SERPL-MCNC: <0.05 NG/ML
PROT SERPL-MCNC: 6.5 G/DL (ref 6.4–8.4)
PROTHROMBIN TIME: 14.1 SECONDS (ref 11.6–14.5)
RBC # BLD AUTO: 4.41 MILLION/UL (ref 3.81–5.12)
SODIUM SERPL-SCNC: 139 MMOL/L (ref 135–147)
WBC # BLD AUTO: 9.88 THOUSAND/UL (ref 4.31–10.16)

## 2023-11-15 PROCEDURE — 83735 ASSAY OF MAGNESIUM: CPT | Performed by: STUDENT IN AN ORGANIZED HEALTH CARE EDUCATION/TRAINING PROGRAM

## 2023-11-15 PROCEDURE — 85730 THROMBOPLASTIN TIME PARTIAL: CPT | Performed by: STUDENT IN AN ORGANIZED HEALTH CARE EDUCATION/TRAINING PROGRAM

## 2023-11-15 PROCEDURE — 87425 ROTAVIRUS AG IA: CPT | Performed by: INTERNAL MEDICINE

## 2023-11-15 PROCEDURE — 87177 OVA AND PARASITES SMEARS: CPT | Performed by: INTERNAL MEDICINE

## 2023-11-15 PROCEDURE — 84145 PROCALCITONIN (PCT): CPT | Performed by: INTERNAL MEDICINE

## 2023-11-15 PROCEDURE — 85025 COMPLETE CBC W/AUTO DIFF WBC: CPT | Performed by: STUDENT IN AN ORGANIZED HEALTH CARE EDUCATION/TRAINING PROGRAM

## 2023-11-15 PROCEDURE — 83993 ASSAY FOR CALPROTECTIN FECAL: CPT | Performed by: INTERNAL MEDICINE

## 2023-11-15 PROCEDURE — 85610 PROTHROMBIN TIME: CPT | Performed by: STUDENT IN AN ORGANIZED HEALTH CARE EDUCATION/TRAINING PROGRAM

## 2023-11-15 PROCEDURE — 99232 SBSQ HOSP IP/OBS MODERATE 35: CPT | Performed by: INTERNAL MEDICINE

## 2023-11-15 PROCEDURE — 87493 C DIFF AMPLIFIED PROBE: CPT | Performed by: INTERNAL MEDICINE

## 2023-11-15 PROCEDURE — 87505 NFCT AGENT DETECTION GI: CPT | Performed by: INTERNAL MEDICINE

## 2023-11-15 PROCEDURE — 80053 COMPREHEN METABOLIC PANEL: CPT | Performed by: STUDENT IN AN ORGANIZED HEALTH CARE EDUCATION/TRAINING PROGRAM

## 2023-11-15 PROCEDURE — 84100 ASSAY OF PHOSPHORUS: CPT | Performed by: STUDENT IN AN ORGANIZED HEALTH CARE EDUCATION/TRAINING PROGRAM

## 2023-11-15 PROCEDURE — 87329 GIARDIA AG IA: CPT | Performed by: INTERNAL MEDICINE

## 2023-11-15 PROCEDURE — 87209 SMEAR COMPLEX STAIN: CPT | Performed by: INTERNAL MEDICINE

## 2023-11-15 RX ORDER — TETRACYCLINE HYDROCHLORIDE 500 MG/1
500 CAPSULE ORAL ONCE
Status: COMPLETED | OUTPATIENT
Start: 2023-11-15 | End: 2023-11-15

## 2023-11-15 RX ORDER — PANTOPRAZOLE SODIUM 40 MG/1
40 TABLET, DELAYED RELEASE ORAL ONCE
Status: COMPLETED | OUTPATIENT
Start: 2023-11-15 | End: 2023-11-15

## 2023-11-15 RX ORDER — ENOXAPARIN SODIUM 100 MG/ML
40 INJECTION SUBCUTANEOUS EVERY 24 HOURS
Status: DISCONTINUED | OUTPATIENT
Start: 2023-11-15 | End: 2023-11-16 | Stop reason: HOSPADM

## 2023-11-15 RX ORDER — POLYETHYLENE GLYCOL 3350 17 G/17G
17 POWDER, FOR SOLUTION ORAL DAILY
Status: DISCONTINUED | OUTPATIENT
Start: 2023-11-15 | End: 2023-11-16 | Stop reason: HOSPADM

## 2023-11-15 RX ORDER — SODIUM CHLORIDE, SODIUM GLUCONATE, SODIUM ACETATE, POTASSIUM CHLORIDE, MAGNESIUM CHLORIDE, SODIUM PHOSPHATE, DIBASIC, AND POTASSIUM PHOSPHATE .53; .5; .37; .037; .03; .012; .00082 G/100ML; G/100ML; G/100ML; G/100ML; G/100ML; G/100ML; G/100ML
75 INJECTION, SOLUTION INTRAVENOUS CONTINUOUS
Status: DISCONTINUED | OUTPATIENT
Start: 2023-11-15 | End: 2023-11-16 | Stop reason: HOSPADM

## 2023-11-15 RX ORDER — POLYETHYLENE GLYCOL 3350 17 G/17G
17 POWDER, FOR SOLUTION ORAL ONCE
Status: COMPLETED | OUTPATIENT
Start: 2023-11-15 | End: 2023-11-15

## 2023-11-15 RX ORDER — METRONIDAZOLE 500 MG/1
500 TABLET ORAL ONCE
Status: COMPLETED | OUTPATIENT
Start: 2023-11-15 | End: 2023-11-15

## 2023-11-15 RX ADMIN — SODIUM CHLORIDE, SODIUM GLUCONATE, SODIUM ACETATE, POTASSIUM CHLORIDE, MAGNESIUM CHLORIDE, SODIUM PHOSPHATE, DIBASIC, AND POTASSIUM PHOSPHATE 75 ML/HR: .53; .5; .37; .037; .03; .012; .00082 INJECTION, SOLUTION INTRAVENOUS at 13:04

## 2023-11-15 RX ADMIN — NICOTINE 1 PATCH: 21 PATCH, EXTENDED RELEASE TRANSDERMAL at 08:18

## 2023-11-15 RX ADMIN — POLYETHYLENE GLYCOL 3350 17 G: 17 POWDER, FOR SOLUTION ORAL at 13:04

## 2023-11-15 RX ADMIN — Medication 524 MG: at 22:52

## 2023-11-15 RX ADMIN — TETRACYCLINE HYDROCHLORIDE 500 MG: 500 CAPSULE ORAL at 17:02

## 2023-11-15 RX ADMIN — PANTOPRAZOLE SODIUM 40 MG: 40 TABLET, DELAYED RELEASE ORAL at 21:55

## 2023-11-15 RX ADMIN — SENNOSIDES 8.6 MG: 8.6 TABLET, FILM COATED ORAL at 21:55

## 2023-11-15 RX ADMIN — METRONIDAZOLE 500 MG: 500 TABLET ORAL at 21:55

## 2023-11-15 RX ADMIN — TETRACYCLINE HYDROCHLORIDE 500 MG: 500 CAPSULE ORAL at 08:18

## 2023-11-15 RX ADMIN — Medication 524 MG: at 17:02

## 2023-11-15 RX ADMIN — TETRACYCLINE HYDROCHLORIDE 500 MG: 500 CAPSULE ORAL at 22:52

## 2023-11-15 RX ADMIN — TETRACYCLINE HYDROCHLORIDE 500 MG: 500 CAPSULE ORAL at 13:04

## 2023-11-15 RX ADMIN — PROMETHAZINE HYDROCHLORIDE 25 MG: 25 INJECTION INTRAMUSCULAR; INTRAVENOUS at 22:08

## 2023-11-15 RX ADMIN — METRONIDAZOLE 500 MG: 500 TABLET ORAL at 17:02

## 2023-11-15 RX ADMIN — Medication 524 MG: at 13:04

## 2023-11-15 RX ADMIN — DOCUSATE SODIUM 100 MG: 100 CAPSULE, LIQUID FILLED ORAL at 17:02

## 2023-11-15 RX ADMIN — PROMETHAZINE HYDROCHLORIDE 25 MG: 25 INJECTION INTRAMUSCULAR; INTRAVENOUS at 17:52

## 2023-11-15 RX ADMIN — DOCUSATE SODIUM 100 MG: 100 CAPSULE, LIQUID FILLED ORAL at 08:18

## 2023-11-15 RX ADMIN — HYDROMORPHONE HYDROCHLORIDE 0.5 MG: 1 INJECTION, SOLUTION INTRAMUSCULAR; INTRAVENOUS; SUBCUTANEOUS at 22:07

## 2023-11-15 RX ADMIN — PANTOPRAZOLE SODIUM 40 MG: 40 TABLET, DELAYED RELEASE ORAL at 08:18

## 2023-11-15 RX ADMIN — FLUTICASONE FUROATE 1 PUFF: 100 POWDER RESPIRATORY (INHALATION) at 08:18

## 2023-11-15 RX ADMIN — Medication 524 MG: at 08:18

## 2023-11-15 RX ADMIN — POLYETHYLENE GLYCOL 3350 17 G: 17 POWDER, FOR SOLUTION ORAL at 09:36

## 2023-11-15 RX ADMIN — METRONIDAZOLE 500 MG: 500 TABLET ORAL at 08:18

## 2023-11-15 RX ADMIN — METRONIDAZOLE 500 MG: 500 TABLET ORAL at 22:52

## 2023-11-15 RX ADMIN — PANTOPRAZOLE SODIUM 40 MG: 40 TABLET, DELAYED RELEASE ORAL at 22:52

## 2023-11-15 RX ADMIN — TETRACYCLINE HYDROCHLORIDE 500 MG: 500 CAPSULE ORAL at 21:55

## 2023-11-15 RX ADMIN — HYDROMORPHONE HYDROCHLORIDE 0.5 MG: 1 INJECTION, SOLUTION INTRAMUSCULAR; INTRAVENOUS; SUBCUTANEOUS at 18:35

## 2023-11-15 RX ADMIN — ENOXAPARIN SODIUM 40 MG: 40 INJECTION SUBCUTANEOUS at 13:04

## 2023-11-15 NOTE — TELEPHONE ENCOUNTER
Can you please contact the patient to schedule her for a follow-up office visit with me in 6 weeks either in the [place] or [place] office for reevaluation.  Thank you.

## 2023-11-15 NOTE — PLAN OF CARE
Problem: PAIN - ADULT  Goal: Verbalizes/displays adequate comfort level or baseline comfort level  Description: Interventions:  - Encourage patient to monitor pain and request assistance  - Assess pain using appropriate pain scale  - Administer analgesics based on type and severity of pain and evaluate response  - Implement non-pharmacological measures as appropriate and evaluate response  - Consider cultural and social influences on pain and pain management  - Notify physician/advanced practitioner if interventions unsuccessful or patient reports new pain  Outcome: Progressing     Problem: INFECTION - ADULT  Goal: Absence or prevention of progression during hospitalization  Description: INTERVENTIONS:  - Assess and monitor for signs and symptoms of infection  - Monitor lab/diagnostic results  - Monitor all insertion sites, i.e. indwelling lines, tubes, and drains  - Monitor endotracheal if appropriate and nasal secretions for changes in amount and color  - [place] appropriate cooling/warming therapies per order  - Administer medications as ordered  - Instruct and encourage patient and family to use good hand hygiene technique  - Identify and instruct in appropriate isolation precautions for identified infection/condition  Outcome: Progressing     Problem: SAFETY ADULT  Goal: Patient will remain free of falls  Description: INTERVENTIONS:  - Educate patient/family on patient safety including physical limitations  - Instruct patient to call for assistance with activity   - Consult OT/PT to assist with strengthening/mobility   - Keep Call bell within reach  - Keep bed low and locked with side rails adjusted as appropriate  - Keep care items and personal belongings within reach  - Initiate and maintain comfort rounds  - Make Fall Risk Sign visible to staff  - Offer Toileting every 2 Hours, in advance of need  - Initiate/Maintain bed alarm  - Obtain necessary fall risk management equipment:   - Apply yellow socks and bracelet for high fall risk patients  - Consider moving patient to room near nurses station  Outcome: Progressing  Goal: Maintain or return to baseline ADL function  Description: INTERVENTIONS:  -  Assess patient's ability to carry out ADLs; assess patient's baseline for ADL function and identify physical deficits which impact ability to perform ADLs (bathing, care of mouth/teeth, toileting, grooming, dressing, etc.)  - Assess/evaluate cause of self-care deficits   - Assess range of motion  - Assess patient's mobility; develop plan if impaired  - Assess patient's need for assistive devices and provide as appropriate  - Encourage maximum independence but intervene and supervise when necessary  - Involve family in performance of ADLs  - Assess for home care needs following discharge   - Consider OT consult to assist with ADL evaluation and planning for discharge  - Provide patient education as appropriate  Outcome: Progressing  Goal: Maintains/Returns to pre admission functional level  Description: INTERVENTIONS:  - Perform BMAT or MOVE assessment daily.   - Set and communicate daily mobility goal to care team and patient/family/caregiver. - Collaborate with rehabilitation services on mobility goals if consulted  - Perform Range of Motion 3 times a day. - Reposition patient every 2 hours.   - Dangle patient 3 times a day  - Stand patient 3 times a day  - Ambulate patient 3 times a day  - Out of bed to chair 3 times a day   - Out of bed for meals 3 times a day  - Out of bed for toileting  - Record patient progress and toleration of activity level   Outcome: Progressing     Problem: DISCHARGE PLANNING  Goal: Discharge to home or other facility with appropriate resources  Description: INTERVENTIONS:  - Identify barriers to discharge w/patient and caregiver  - Arrange for needed discharge resources and transportation as appropriate  - Identify discharge learning needs (meds, wound care, etc.)  - Arrange for interpretive services to assist at discharge as needed  - Refer to Case Management Department for coordinating discharge planning if the patient needs post-hospital services based on physician/advanced practitioner order or complex needs related to functional status, cognitive ability, or social support system  Outcome: Progressing     Problem: Knowledge Deficit  Goal: Patient/family/caregiver demonstrates understanding of disease process, treatment plan, medications, and discharge instructions  Description: Complete learning assessment and assess knowledge base. Interventions:  - Provide teaching at level of understanding  - Provide teaching via preferred learning methods  Outcome: Progressing     Problem: Nutrition/Hydration-ADULT  Goal: Nutrient/Hydration intake appropriate for improving, restoring or maintaining nutritional needs  Description: Monitor and assess patient's nutrition/hydration status for malnutrition. Collaborate with interdisciplinary team and initiate plan and interventions as ordered. Monitor patient's weight and dietary intake as ordered or per policy. Utilize nutrition screening tool and intervene as necessary. Determine patient's food preferences and provide high-protein, high-caloric foods as appropriate.      INTERVENTIONS:  - Monitor oral intake, urinary output, labs, and treatment plans  - Assess nutrition and hydration status and recommend course of action  - Evaluate amount of meals eaten  - Assist patient with eating if necessary   - Allow adequate time for meals  - Recommend/ encourage appropriate diets, oral nutritional supplements, and vitamin/mineral supplements  - Order, calculate, and assess calorie counts as needed  - Recommend, monitor, and adjust tube feedings and TPN/PPN based on assessed needs  - Assess need for intravenous fluids  - Provide specific nutrition/hydration education as appropriate  - Include patient/family/caregiver in decisions related to nutrition  Outcome: Progressing

## 2023-11-15 NOTE — PLAN OF CARE
Problem: PAIN - ADULT  Goal: Verbalizes/displays adequate comfort level or baseline comfort level  Description: Interventions:  - Encourage patient to monitor pain and request assistance  - Assess pain using appropriate pain scale  - Administer analgesics based on type and severity of pain and evaluate response  - Implement non-pharmacological measures as appropriate and evaluate response  - Consider cultural and social influences on pain and pain management  - Notify physician/advanced practitioner if interventions unsuccessful or patient reports new pain  Outcome: Progressing     Problem: INFECTION - ADULT  Goal: Absence or prevention of progression during hospitalization  Description: INTERVENTIONS:  - Assess and monitor for signs and symptoms of infection  - Monitor lab/diagnostic results  - Monitor all insertion sites, i.e. indwelling lines, tubes, and drains  - Monitor endotracheal if appropriate and nasal secretions for changes in amount and color  - [place] appropriate cooling/warming therapies per order  - Administer medications as ordered  - Instruct and encourage patient and family to use good hand hygiene technique  - Identify and instruct in appropriate isolation precautions for identified infection/condition  Outcome: Progressing     Problem: SAFETY ADULT  Goal: Patient will remain free of falls  Description: INTERVENTIONS:  - Educate patient/family on patient safety including physical limitations  - Instruct patient to call for assistance with activity   - Consult OT/PT to assist with strengthening/mobility   - Keep Call bell within reach  - Keep bed low and locked with side rails adjusted as appropriate  - Keep care items and personal belongings within reach  - Initiate and maintain comfort rounds  - Make Fall Risk Sign visible to staff  - Offer Toileting every 2 Hours, in advance of need  - Initiate/Maintain bed alarm  - Obtain necessary fall risk management equipment: non skid socks   - Apply yellow socks and bracelet for high fall risk patients  - Consider moving patient to room near nurses station  Outcome: Progressing  Goal: Maintain or return to baseline ADL function  Description: INTERVENTIONS:  -  Assess patient's ability to carry out ADLs; assess patient's baseline for ADL function and identify physical deficits which impact ability to perform ADLs (bathing, care of mouth/teeth, toileting, grooming, dressing, etc.)  - Assess/evaluate cause of self-care deficits   - Assess range of motion  - Assess patient's mobility; develop plan if impaired  - Assess patient's need for assistive devices and provide as appropriate  - Encourage maximum independence but intervene and supervise when necessary  - Involve family in performance of ADLs  - Assess for home care needs following discharge   - Consider OT consult to assist with ADL evaluation and planning for discharge  - Provide patient education as appropriate  Outcome: Progressing  Goal: Maintains/Returns to pre admission functional level  Description: INTERVENTIONS:  - Perform BMAT or MOVE assessment daily.   - Set and communicate daily mobility goal to care team and patient/family/caregiver. - Collaborate with rehabilitation services on mobility goals if consulted  - Perform Range of Motion 3 times a day. - Reposition patient every 3 hours.   - Dangle patient 3 times a day  - Stand patient 3 times a day  - Ambulate patient 3 times a day  - Out of bed to chair 3 times a day   - Out of bed for meals 3 times a day  - Out of bed for toileting  - Record patient progress and toleration of activity level   Outcome: Progressing     Problem: DISCHARGE PLANNING  Goal: Discharge to home or other facility with appropriate resources  Description: INTERVENTIONS:  - Identify barriers to discharge w/patient and caregiver  - Arrange for needed discharge resources and transportation as appropriate  - Identify discharge learning needs (meds, wound care, etc.)  - Arrange for interpretive services to assist at discharge as needed  - Refer to Case Management Department for coordinating discharge planning if the patient needs post-hospital services based on physician/advanced practitioner order or complex needs related to functional status, cognitive ability, or social support system  Outcome: Progressing     Problem: Knowledge Deficit  Goal: Patient/family/caregiver demonstrates understanding of disease process, treatment plan, medications, and discharge instructions  Description: Complete learning assessment and assess knowledge base. Interventions:  - Provide teaching at level of understanding  - Provide teaching via preferred learning methods  Outcome: Progressing     Problem: Nutrition/Hydration-ADULT  Goal: Nutrient/Hydration intake appropriate for improving, restoring or maintaining nutritional needs  Description: Monitor and assess patient's nutrition/hydration status for malnutrition. Collaborate with interdisciplinary team and initiate plan and interventions as ordered. Monitor patient's weight and dietary intake as ordered or per policy. Utilize nutrition screening tool and intervene as necessary. Determine patient's food preferences and provide high-protein, high-caloric foods as appropriate.      INTERVENTIONS:  - Monitor oral intake, urinary output, labs, and treatment plans  - Assess nutrition and hydration status and recommend course of action  - Evaluate amount of meals eaten  - Assist patient with eating if necessary   - Allow adequate time for meals  - Recommend/ encourage appropriate diets, oral nutritional supplements, and vitamin/mineral supplements  - Order, calculate, and assess calorie counts as needed  - Recommend, monitor, and adjust tube feedings and TPN/PPN based on assessed needs  - Assess need for intravenous fluids  - Provide specific nutrition/hydration education as appropriate  - Include patient/family/caregiver in decisions related to nutrition  Outcome: Progressing

## 2023-11-15 NOTE — PROGRESS NOTES
Progress Note- Izabel Cherry 34 y.o. female MRN: [id number]    Unit/Bed#: 425-01 Encounter: [id number]      Assessment and Plan:    Serology: BUN 4, AST 9, ALT 5, otherwise within normal limits. Exam: Pt was sitting up in bed eating a clear liquid breakfast upon exam today, A&O x 3, pleasant and cooperative. Abdomen is soft with mild tenderness noted in the epigastric and right upper quadrant without any significant guarding or rebound tenderness noted upon exam with hypoactive bowel sounds x4. Skin is non-icteric. No edema noted of the b/l lower extremities upon exam today. Abdominal Pain  Nausea/Vomiting  Diarrhea: Resolved  Constipation  H Pylori  Hepatic steatosis    At this point in time we will proceed with quadruple H. pylori treatment once she is completed antibiotic treatment for her UTI. She will then need a fecal H. Pylori test 2 weeks after completing the quadruple therapy to confirm eradication. Since the patient has not had a bowel movement in 4 days we would recommend starting MiraLAX daily. Encouraged the patient to try to increase her overall daily water intake to least 64 ounces of water daily. Can advance diet as tolerated. Continue antibiotics as per primary team.   Continue antiemetics as prescribed. Continue supportive care. From a GI perspective once she is tolerating a regular diet and is feeling well, she can be discharged home and we will have the office staff reach out to schedule her for a follow-up office visit in the next 4 to 6 weeks. GI will sign off.  __________________________________________________________________    Subjective:     Patient is a 34 y.o. female  with a past medical history of colitis, asthma, and cyclical nausea and vomiting, who presented to the ER on November 13, 2023 for worsening abdominal pain, nausea, and vomiting.   The patient currently reports there has been moderate overall improvement in her symptoms since yesterday she does have some slight nausea, but reports she has not vomited since last night. She continues to report mild epigastric and right upper quadrant abdominal tenderness, which has overall improved since her EGD yesterday. Her EGD was normal and a random biopsy to rule out celiac disease and H. pylori was performed with a recommendation to consider a gastric emptying study for further work-up on an outpatient basis, start outpatient treatment for H. pylori after patient has completed her antibiotic course for cystitis, and continued strict cessation of marijuana to try to rule out cannabis hyperemesis. The patient is currently tolerating a clear liquid diet. The patient has not had a bowel movement in 4 days and thus the currently ordered stool studies have not been collected.     Medication Administration - last 24 hours from 11/14/2023 1029 to 11/15/2023 1029         Date/Time Order Dose Route Action Action by     11/14/2023 1549 EST sodium chloride 0.9 % infusion 125 mL/hr Intravenous Restarted [facility], RN     11/14/2023 1803 EST cefTRIAXone (ROCEPHIN) IVPB (premix in dextrose) 1,000 mg 50 mL 1,000 mg Intravenous Not Given [facility],      11/15/2023 0818 EST fluticasone (ARNUITY ELLIPTA) 100 MCG/ACT inhaler 1 puff 1 puff Inhalation Given Leoncio Oyster     11/15/2023 0818 EST nicotine (NICODERM CQ) 21 mg/24 hr TD 24 hr patch 1 patch 1 patch Transdermal Medication Applied [facility]     11/15/2023 0815 EST nicotine (NICODERM CQ) 21 mg/24 hr TD 24 hr patch 1 patch 1 patch Transdermal Patch Removed Leoncio Oyster     11/14/2023 1352 EST potassium chloride 20 mEq IVPB (premix) 20 mEq Intravenous Not Given Sean Adams     11/14/2023 2337 EST promethazine (PHENERGAN) injection 25 mg 25 mg Intravenous Given Samara Cruz RN     11/14/2023 2324 EST HYDROmorphone (DILAUDID) injection 0.5 mg 0.5 mg Intravenous Given Samara Cruz RN     11/14/2023 1547 EST potassium chloride 20 mEq IVPB (premix) 20 mEq Intravenous [employer], [address]     11/14/2023 1550 EST lactated ringers infusion 0 mL/hr Intravenous [address], RN     11/15/2023 0818 EST metroNIDAZOLE (FLAGYL) tablet 500 mg 500 mg Oral Given [facility]     11/14/2023 2213 EST metroNIDAZOLE (FLAGYL) tablet 500 mg 500 mg Oral Given Bear Saver [facility], [address]     11/15/2023 0818 EST tetracycline (ACHROMYCIN,SUMYCIN) capsule 500 mg 500 mg Oral Given [facility]     11/14/2023 1841 EST tetracycline (ACHROMYCIN,SUMYCIN) capsule 500 mg 500 mg Oral Given [facility]     11/15/2023 0818 EST pantoprazole (PROTONIX) EC tablet 40 mg 40 mg Oral Given [facility]     11/14/2023 2213 EST pantoprazole (PROTONIX) EC tablet 40 mg 40 mg Oral Given Aquilino Will, [address]     11/15/2023 0818 EST bismuth subsalicylate (PEPTO BISMOL) oral suspension 524 mg 524 mg Oral Given [facility]     11/14/2023 1842 EST bismuth subsalicylate (PEPTO BISMOL) oral suspension 524 mg 524 mg Oral Given [facility]     11/14/2023 2213 EST senna (SENOKOT) tablet 8.6 mg 8.6 mg Oral Given [place] [facility], [address] [place] [address]     11/15/2023 0818 EST docusate sodium (COLACE) capsule 100 mg 100 mg Oral Given [facility]     11/14/2023 1841 EST docusate sodium (COLACE) capsule 100 mg 100 mg Oral Given [facility]     11/15/2023 0936 EST polyethylene glycol (MIRALAX) packet 17 g 17 g Oral Given [facility]            Objective:     Vitals: Blood pressure 122/78, pulse 89, temperature 99.1 °F (37.3 °C), temperature source Oral, resp. rate 17, height 5' 4" (1.626 m), weight 83.7 kg (184 lb 8.4 oz), SpO2 98 %. ,Body mass index is 31.67 kg/m².       Intake/Output Summary (Last 24 hours) at 11/15/2023 1029  Last data filed at 11/14/2023 1756  Gross per 24 hour   Intake 680 ml   Output --   Net 680 ml       Physical Exam:   General Appearance: Awake and alert, in no acute distress  Abdomen: Soft, non-tender, non-distended; bowel sounds normal; no masses or no organomegaly    Invasive Devices       Peripheral Intravenous Line  Duration             Peripheral IV 11/14/23 Right;Dorsal (posterior) Wrist 1 day                    Lab Results:  Admission on 11/13/2023   Component Date Value    pH, Rupesh 11/13/2023 7.459 (H)     pCO2, Rupesh 11/13/2023 35.7 (L)     pO2, Rupesh 11/13/2023 37.1     HCO3, Rupesh 11/13/2023 24.8     Base Excess, Rupesh 11/13/2023 1.3     O2 Content, Rupesh 11/13/2023 14.7     O2 HGB, VENOUS 11/13/2023 72.6     WBC 11/13/2023 13.89 (H)     RBC 11/13/2023 4.93     Hemoglobin 11/13/2023 13.7     Hematocrit 11/13/2023 42.3     MCV 11/13/2023 86     MCH 11/13/2023 27.8     MCHC 11/13/2023 32.4     RDW 11/13/2023 14.1     MPV 11/13/2023 10.1     Platelets 28/22/3738 353     nRBC 11/13/2023 0     Neutrophils Relative 11/13/2023 81 (H)     Immat GRANS % 11/13/2023 0     Lymphocytes Relative 11/13/2023 13 (L)     Monocytes Relative 11/13/2023 5     Eosinophils Relative 11/13/2023 0     Basophils Relative 11/13/2023 1     Neutrophils Absolute 11/13/2023 11.13 (H)     Immature Grans Absolute 11/13/2023 0.05     Lymphocytes Absolute 11/13/2023 1.83     Monocytes Absolute 11/13/2023 0.74     Eosinophils Absolute 11/13/2023 0.05     Basophils Absolute 11/13/2023 0.09     Sodium 11/13/2023 136     Potassium 11/13/2023 4.0     Chloride 11/13/2023 98     CO2 11/13/2023 25     ANION GAP 11/13/2023 13     BUN 11/13/2023 13     Creatinine 11/13/2023 0.69     Glucose 11/13/2023 95     Calcium 11/13/2023 9.8     AST 11/13/2023 21     ALT 11/13/2023 9     Alkaline Phosphatase 11/13/2023 51     Total Protein 11/13/2023 7.9     Albumin 11/13/2023 4.6     Total Bilirubin 11/13/2023 0.51     eGFR 11/13/2023 116     Lipase 11/13/2023 26     Color, UA 11/13/2023 Yellow     Clarity, UA 11/13/2023 Clear     Specific Gravity, UA 11/13/2023 >=1.030     pH, UA 11/13/2023 6.5     Leukocytes, UA 11/13/2023 Trace (A)     Nitrite, UA 11/13/2023 Negative     Protein, UA 11/13/2023 Trace (A)     Glucose, UA 11/13/2023 Negative     Ketones, UA 11/13/2023 >=80 (3+) (A)     Urobilinogen, UA 11/13/2023 0.2     Bilirubin, UA 11/13/2023 Small (A)     Occult Blood, UA 11/13/2023 Negative     EXT Preg Test, Ur 11/13/2023 Negative     Control 11/13/2023 Valid     Amph/Meth UR 11/13/2023 Negative     Barbiturate Ur 11/13/2023 Negative     Benzodiazepine Urine 11/13/2023 Negative     Cocaine Urine 11/13/2023 Negative     Methadone Urine 11/13/2023 Negative     Opiate Urine 11/13/2023 Negative     PCP Ur 11/13/2023 Negative     THC Urine 11/13/2023 Positive (A)     Oxycodone Urine 11/13/2023 Negative     SARS-CoV-2 11/13/2023 Negative     INFLUENZA A PCR 11/13/2023 Negative     INFLUENZA B PCR 11/13/2023 Negative     RSV PCR 11/13/2023 Negative     LACTIC ACID 11/13/2023 0.9     RBC, UA 11/13/2023 0-5     WBC, UA 11/13/2023 4-10 (A)     Epithelial Cells 11/13/2023 Occasional     Bacteria, UA 11/13/2023 Moderate (A)     WBC Clumps 11/13/2023 OCCASSIONALLY OBSERVED     OTHER OBSERVATIONS 11/13/2023 Transitional Epithelial Cells     MUCUS THREADS 11/13/2023 Occasional (A)     Urine Culture 11/13/2023 No Growth <1000 cfu/mL     PTT 11/14/2023 30     Protime 11/14/2023 14.9 (H)     INR 11/14/2023 1.18     Phosphorus 11/14/2023 3.5     Magnesium 11/14/2023 2.2     Sodium 11/14/2023 139     Potassium 11/14/2023 3.4 (L)     Chloride 11/14/2023 104     CO2 11/14/2023 27     ANION GAP 11/14/2023 8     BUN 11/14/2023 7     Creatinine 11/14/2023 0.65     Glucose 11/14/2023 72     Glucose, Fasting 11/14/2023 72     Calcium 11/14/2023 8.7     AST 11/14/2023 10 (L)     ALT 11/14/2023 6 (L)     Alkaline Phosphatase 11/14/2023 41     Total Protein 11/14/2023 6.0 (L)     Albumin 11/14/2023 3.7     Total Bilirubin 11/14/2023 0.40     eGFR 11/14/2023 118     WBC 11/14/2023 10.05     RBC 11/14/2023 4.11     Hemoglobin 11/14/2023 11.3 (L)     Hematocrit 11/14/2023 35.8     MCV 11/14/2023 87     MCH 11/14/2023 27.5     MCHC 11/14/2023 31.6     RDW 11/14/2023 13.9     MPV 11/14/2023 9.8     Platelets 00/63/2922 310     nRBC 11/14/2023 0     Neutrophils Relative 11/14/2023 55     Immat GRANS % 11/14/2023 0     Lymphocytes Relative 11/14/2023 36     Monocytes Relative 11/14/2023 7     Eosinophils Relative 11/14/2023 1     Basophils Relative 11/14/2023 1     Neutrophils Absolute 11/14/2023 5.41     Immature Grans Absolute 11/14/2023 0.03     Lymphocytes Absolute 11/14/2023 3.65     Monocytes Absolute 11/14/2023 0.74     Eosinophils Absolute 11/14/2023 0.13     Basophils Absolute 11/14/2023 0.09     Procalcitonin 11/14/2023 <0.05     PTT 11/15/2023 30     Protime 11/15/2023 14.1     INR 11/15/2023 1.10     Phosphorus 11/15/2023 3.9     Magnesium 11/15/2023 2.0     Sodium 11/15/2023 139     Potassium 11/15/2023 3.5     Chloride 11/15/2023 105     CO2 11/15/2023 23     ANION GAP 11/15/2023 11     BUN 11/15/2023 4 (L)     Creatinine 11/15/2023 0.61     Glucose 11/15/2023 83     Calcium 11/15/2023 9.5     AST 11/15/2023 9 (L)     ALT 11/15/2023 5 (L)     Alkaline Phosphatase 11/15/2023 46     Total Protein 11/15/2023 6.5     Albumin 11/15/2023 3.9     Total Bilirubin 11/15/2023 0.48     eGFR 11/15/2023 121     WBC 11/15/2023 9.88     RBC 11/15/2023 4.41     Hemoglobin 11/15/2023 12.1     Hematocrit 11/15/2023 38.0     MCV 11/15/2023 86     MCH 11/15/2023 27.4     MCHC 11/15/2023 31.8     RDW 11/15/2023 13.7     MPV 11/15/2023 9.5     Platelets 75/16/4021 342     nRBC 11/15/2023 0     Neutrophils Relative 11/15/2023 65     Immat GRANS % 11/15/2023 0     Lymphocytes Relative 11/15/2023 27     Monocytes Relative 11/15/2023 7     Eosinophils Relative 11/15/2023 0     Basophils Relative 11/15/2023 1     Neutrophils Absolute 11/15/2023 6.38     Immature Grans Absolute 11/15/2023 0.03     Lymphocytes Absolute 11/15/2023 2.67     Monocytes Absolute 11/15/2023 0.71     Eosinophils Absolute 11/15/2023 0.04     Basophils Absolute 11/15/2023 0.05     Procalcitonin 11/15/2023 <0.05        Imaging Studies: I have personally reviewed pertinent imaging studies.

## 2023-11-16 VITALS
BODY MASS INDEX: 31.5 KG/M2 | OXYGEN SATURATION: 96 % | DIASTOLIC BLOOD PRESSURE: 65 MMHG | TEMPERATURE: 98.6 F | HEIGHT: 64 IN | RESPIRATION RATE: 20 BRPM | HEART RATE: 83 BPM | SYSTOLIC BLOOD PRESSURE: 107 MMHG | WEIGHT: 184.53 LBS

## 2023-11-16 PROBLEM — R11.2 INTRACTABLE NAUSEA AND VOMITING: Status: RESOLVED | Noted: 2021-03-23 | Resolved: 2023-11-16

## 2023-11-16 PROBLEM — D72.829 LEUKOCYTOSIS: Status: RESOLVED | Noted: 2021-03-21 | Resolved: 2023-11-16

## 2023-11-16 PROBLEM — N30.00 ACUTE CYSTITIS WITHOUT HEMATURIA: Status: RESOLVED | Noted: 2023-11-13 | Resolved: 2023-11-16

## 2023-11-16 PROBLEM — R10.13 EPIGASTRIC PAIN: Status: RESOLVED | Noted: 2023-11-14 | Resolved: 2023-11-16

## 2023-11-16 LAB
ALBUMIN SERPL BCP-MCNC: 3.8 G/DL (ref 3.5–5)
ALP SERPL-CCNC: 46 U/L (ref 34–104)
ALT SERPL W P-5'-P-CCNC: 6 U/L (ref 7–52)
ANION GAP SERPL CALCULATED.3IONS-SCNC: 9 MMOL/L
AST SERPL W P-5'-P-CCNC: 10 U/L (ref 13–39)
BASOPHILS # BLD AUTO: 0.08 THOUSANDS/ÂΜL (ref 0–0.1)
BASOPHILS NFR BLD AUTO: 1 % (ref 0–1)
BILIRUB SERPL-MCNC: 0.52 MG/DL (ref 0.2–1)
BUN SERPL-MCNC: 7 MG/DL (ref 5–25)
C DIFF TOX A+B STL QL IA: NEGATIVE
C DIFF TOX GENS STL QL NAA+PROBE: POSITIVE
CALCIUM SERPL-MCNC: 9.4 MG/DL (ref 8.4–10.2)
CAMPYLOBACTER DNA SPEC NAA+PROBE: NORMAL
CHLORIDE SERPL-SCNC: 104 MMOL/L (ref 96–108)
CO2 SERPL-SCNC: 27 MMOL/L (ref 21–32)
CREAT SERPL-MCNC: 0.69 MG/DL (ref 0.6–1.3)
EOSINOPHIL # BLD AUTO: 0.07 THOUSAND/ÂΜL (ref 0–0.61)
EOSINOPHIL NFR BLD AUTO: 1 % (ref 0–6)
ERYTHROCYTE [DISTWIDTH] IN BLOOD BY AUTOMATED COUNT: 13.9 % (ref 11.6–15.1)
GFR SERPL CREATININE-BSD FRML MDRD: 116 ML/MIN/1.73SQ M
GLUCOSE SERPL-MCNC: 88 MG/DL (ref 65–140)
HCT VFR BLD AUTO: 38.9 % (ref 34.8–46.1)
HGB BLD-MCNC: 12.6 G/DL (ref 11.5–15.4)
IMM GRANULOCYTES # BLD AUTO: 0.03 THOUSAND/UL (ref 0–0.2)
IMM GRANULOCYTES NFR BLD AUTO: 0 % (ref 0–2)
INR PPP: 1.13 (ref 0.84–1.19)
LYMPHOCYTES # BLD AUTO: 2.74 THOUSANDS/ÂΜL (ref 0.6–4.47)
LYMPHOCYTES NFR BLD AUTO: 29 % (ref 14–44)
MAGNESIUM SERPL-MCNC: 2 MG/DL (ref 1.9–2.7)
MCH RBC QN AUTO: 28.1 PG (ref 26.8–34.3)
MCHC RBC AUTO-ENTMCNC: 32.4 G/DL (ref 31.4–37.4)
MCV RBC AUTO: 87 FL (ref 82–98)
MONOCYTES # BLD AUTO: 0.66 THOUSAND/ÂΜL (ref 0.17–1.22)
MONOCYTES NFR BLD AUTO: 7 % (ref 4–12)
NEUTROPHILS # BLD AUTO: 5.79 THOUSANDS/ÂΜL (ref 1.85–7.62)
NEUTS SEG NFR BLD AUTO: 62 % (ref 43–75)
NRBC BLD AUTO-RTO: 0 /100 WBCS
PHOSPHATE SERPL-MCNC: 4 MG/DL (ref 2.7–4.5)
PLATELET # BLD AUTO: 353 THOUSANDS/UL (ref 149–390)
PMV BLD AUTO: 9.5 FL (ref 8.9–12.7)
POTASSIUM SERPL-SCNC: 3.6 MMOL/L (ref 3.5–5.3)
PROCALCITONIN SERPL-MCNC: <0.05 NG/ML
PROT SERPL-MCNC: 6.3 G/DL (ref 6.4–8.4)
PROTHROMBIN TIME: 14.4 SECONDS (ref 11.6–14.5)
RBC # BLD AUTO: 4.48 MILLION/UL (ref 3.81–5.12)
RV AG STL QL: NEGATIVE
SALMONELLA DNA SPEC QL NAA+PROBE: NORMAL
SHIGA TOXIN STX GENE SPEC NAA+PROBE: NORMAL
SHIGELLA DNA SPEC QL NAA+PROBE: NORMAL
SODIUM SERPL-SCNC: 140 MMOL/L (ref 135–147)
WBC # BLD AUTO: 9.37 THOUSAND/UL (ref 4.31–10.16)

## 2023-11-16 PROCEDURE — 99239 HOSP IP/OBS DSCHRG MGMT >30: CPT | Performed by: INTERNAL MEDICINE

## 2023-11-16 PROCEDURE — 85610 PROTHROMBIN TIME: CPT | Performed by: STUDENT IN AN ORGANIZED HEALTH CARE EDUCATION/TRAINING PROGRAM

## 2023-11-16 PROCEDURE — 80053 COMPREHEN METABOLIC PANEL: CPT | Performed by: STUDENT IN AN ORGANIZED HEALTH CARE EDUCATION/TRAINING PROGRAM

## 2023-11-16 PROCEDURE — 84100 ASSAY OF PHOSPHORUS: CPT | Performed by: STUDENT IN AN ORGANIZED HEALTH CARE EDUCATION/TRAINING PROGRAM

## 2023-11-16 PROCEDURE — 83735 ASSAY OF MAGNESIUM: CPT | Performed by: STUDENT IN AN ORGANIZED HEALTH CARE EDUCATION/TRAINING PROGRAM

## 2023-11-16 PROCEDURE — 85025 COMPLETE CBC W/AUTO DIFF WBC: CPT | Performed by: STUDENT IN AN ORGANIZED HEALTH CARE EDUCATION/TRAINING PROGRAM

## 2023-11-16 PROCEDURE — 84145 PROCALCITONIN (PCT): CPT | Performed by: INTERNAL MEDICINE

## 2023-11-16 RX ORDER — PANTOPRAZOLE SODIUM 40 MG/1
40 TABLET, DELAYED RELEASE ORAL 2 TIMES DAILY
Qty: 24 TABLET | Refills: 0 | Status: SHIPPED | OUTPATIENT
Start: 2023-11-16 | End: 2023-11-28

## 2023-11-16 RX ORDER — METRONIDAZOLE 500 MG/1
500 TABLET ORAL 3 TIMES DAILY
Qty: 37 TABLET | Refills: 0 | Status: SHIPPED | OUTPATIENT
Start: 2023-11-16 | End: 2023-11-16 | Stop reason: SDUPTHER

## 2023-11-16 RX ORDER — POTASSIUM CHLORIDE 20 MEQ/1
40 TABLET, EXTENDED RELEASE ORAL ONCE
Status: COMPLETED | OUTPATIENT
Start: 2023-11-16 | End: 2023-11-16

## 2023-11-16 RX ORDER — PANTOPRAZOLE SODIUM 40 MG/1
40 TABLET, DELAYED RELEASE ORAL 2 TIMES DAILY
Qty: 24 TABLET | Refills: 0 | Status: SHIPPED | OUTPATIENT
Start: 2023-11-16 | End: 2023-11-16 | Stop reason: SDUPTHER

## 2023-11-16 RX ORDER — TETRACYCLINE HYDROCHLORIDE 500 MG/1
500 CAPSULE ORAL 4 TIMES DAILY
Qty: 51 CAPSULE | Refills: 0 | Status: SHIPPED | OUTPATIENT
Start: 2023-11-16 | End: 2023-11-29

## 2023-11-16 RX ORDER — METRONIDAZOLE 500 MG/1
500 TABLET ORAL 3 TIMES DAILY
Qty: 37 TABLET | Refills: 0 | Status: SHIPPED | OUTPATIENT
Start: 2023-11-16 | End: 2023-11-29

## 2023-11-16 RX ORDER — ACETAMINOPHEN 325 MG/1
650 TABLET ORAL EVERY 6 HOURS PRN
Refills: 0
Start: 2023-11-16

## 2023-11-16 RX ORDER — TETRACYCLINE HYDROCHLORIDE 500 MG/1
500 CAPSULE ORAL 4 TIMES DAILY
Qty: 51 CAPSULE | Refills: 0 | Status: SHIPPED | OUTPATIENT
Start: 2023-11-16 | End: 2023-11-16 | Stop reason: SDUPTHER

## 2023-11-16 RX ORDER — PROMETHAZINE HYDROCHLORIDE 25 MG/1
25 TABLET ORAL EVERY 6 HOURS PRN
Qty: 30 TABLET | Refills: 0 | Status: SHIPPED | OUTPATIENT
Start: 2023-11-16

## 2023-11-16 RX ORDER — ALBUTEROL SULFATE 90 UG/1
2 AEROSOL, METERED RESPIRATORY (INHALATION) EVERY 6 HOURS PRN
Qty: 8.5 G | Refills: 0 | Status: SHIPPED | OUTPATIENT
Start: 2023-11-16 | End: 2023-11-16 | Stop reason: SDUPTHER

## 2023-11-16 RX ORDER — ALBUTEROL SULFATE 90 UG/1
2 AEROSOL, METERED RESPIRATORY (INHALATION) EVERY 6 HOURS PRN
Qty: 8.5 G | Refills: 0 | Status: SHIPPED | OUTPATIENT
Start: 2023-11-16

## 2023-11-16 RX ORDER — PROMETHAZINE HYDROCHLORIDE 25 MG/1
25 TABLET ORAL EVERY 6 HOURS PRN
Qty: 30 TABLET | Refills: 0 | Status: SHIPPED | OUTPATIENT
Start: 2023-11-16 | End: 2023-11-16 | Stop reason: SDUPTHER

## 2023-11-16 RX ORDER — POTASSIUM CHLORIDE 20 MEQ/1
TABLET, EXTENDED RELEASE ORAL
Status: COMPLETED
Start: 2023-11-16 | End: 2023-11-16

## 2023-11-16 RX ADMIN — POLYETHYLENE GLYCOL 3350 17 G: 17 POWDER, FOR SOLUTION ORAL at 08:21

## 2023-11-16 RX ADMIN — POTASSIUM CHLORIDE 40 MEQ: 1500 TABLET, EXTENDED RELEASE ORAL at 08:21

## 2023-11-16 RX ADMIN — PANTOPRAZOLE SODIUM 40 MG: 40 TABLET, DELAYED RELEASE ORAL at 08:21

## 2023-11-16 RX ADMIN — NICOTINE 1 PATCH: 21 PATCH, EXTENDED RELEASE TRANSDERMAL at 08:21

## 2023-11-16 RX ADMIN — PROMETHAZINE HYDROCHLORIDE 25 MG: 25 INJECTION INTRAMUSCULAR; INTRAVENOUS at 08:40

## 2023-11-16 RX ADMIN — SODIUM CHLORIDE, SODIUM GLUCONATE, SODIUM ACETATE, POTASSIUM CHLORIDE, MAGNESIUM CHLORIDE, SODIUM PHOSPHATE, DIBASIC, AND POTASSIUM PHOSPHATE 75 ML/HR: .53; .5; .37; .037; .03; .012; .00082 INJECTION, SOLUTION INTRAVENOUS at 02:06

## 2023-11-16 RX ADMIN — METRONIDAZOLE 500 MG: 500 TABLET ORAL at 08:21

## 2023-11-16 RX ADMIN — POTASSIUM CHLORIDE: 1500 TABLET, EXTENDED RELEASE ORAL at 08:39

## 2023-11-16 RX ADMIN — FLUTICASONE FUROATE 1 PUFF: 100 POWDER RESPIRATORY (INHALATION) at 08:21

## 2023-11-16 RX ADMIN — Medication 524 MG: at 08:21

## 2023-11-16 RX ADMIN — TETRACYCLINE HYDROCHLORIDE 500 MG: 500 CAPSULE ORAL at 11:16

## 2023-11-16 RX ADMIN — Medication 524 MG: at 11:16

## 2023-11-16 RX ADMIN — DOCUSATE SODIUM 100 MG: 100 CAPSULE, LIQUID FILLED ORAL at 08:21

## 2023-11-16 NOTE — DISCHARGE INSTR - AVS FIRST PAGE
-Please check fecal H. Pylori test 2 weeks after completing the 14 days of quadruple therapy to confirm eradication of H pylori infection in your stomach  -This test is in your chart, please go to any [facility] to complete the stool study

## 2023-11-16 NOTE — DISCHARGE SUMMARY
[address]  Discharge- Vera Hightower 1990, 34 y.o. female MRN: [id number]  Unit/Bed#: 425-01 Encounter: [id number]  Primary Care Provider: Dhara Sol DO   Date and time admitted to hospital: 11/13/2023  1:37 PM    * Intractable nausea and vomiting-resolved as of 11/16/2023  Assessment & Plan  -H pylori antigen, stool test on 11/10 --> tested positive  -negative stool enteric bacterial panel PCR, rotavirus  -ova parasite 11/10 normal result  -celiac disease antibody negative on 4/19/23    -gastroenterology performed endoscopy on 11/13 showed normal findings  --> f/u biopsy result as outpatient  --> if symptom persistent, GI recommend gastric emptying test. Explained the recommendation to patient and mother on 11/14  -tolerating solid diet  -IV zofran PRN --> IV promethazine PRN --> discharged on promethazine PO to be used as needed      Positive H. pylori test  Assessment & Plan  -treatment for total of 14 days with quadruple therapy (11/14/23 started inpatient - 11/29/23 to be completed at home) --> for a total of 14 days  -pantoprazole 40mg two times daily  -doxycycline 500mg 4 times daily  -metronidazole 500mg three times daily  -bismuth (pepto bismol) 30 mL 4 times daily    -treatment plan was discussed with GI physician after endoscopy  -she needs a fecal H. Pylori test 2 weeks after completing the quadruple therapy to confirm eradication  --> lab work is placed in her chart at time of discharge    Epigastric pain-resolved as of 11/16/2023  Assessment & Plan  -improved by 11/15  -no gastric ulcer on endoscopy    Mild persistent asthma without complication  Assessment & Plan  -symptoms not controlled on albuterol inhaler PRN at home, wakes up at night 2+ per week, uses albuterol multiple times per week, frequently with chest tightness    -she needs to be on daily ICS inhaler for maintenance (inhaled corticosteroid)  --> discharged on fluticasone inhaler (Arnuity Ellipta) - really benefited from this medication in the hospital  -albuterol inhaler as needed for wheezing  -reports to have spirometry in the past for diagnosis      Acute cystitis without hematuria-resolved as of 11/16/2023  Assessment & Plan  -IV ceftriaxone (11/13 - 11/14)  -urine culture did not indicate UTI, also denies having UTI symptoms - IV antibiotics discontinued  -transitioned to oral antibiotics which is already treating her H. pylori    Hepatic steatosis  Assessment & Plan  -confirmed on 4/2023 RUQ ultrasound, again seen on CT A/P on admission  -dietary modification    Leukocytosis-resolved as of 11/16/2023  Assessment & Plan  -daily trend is having resolution of leukocytosis, likely improving because of treatment  -follow up as outpatient, does not need hematology urgently        Discharging Physician / Practitioner: Blanquita Rodrigues MD  PCP: Samir Moreno DO  Admission Date:   Admission Orders (From admission, onward)       Ordered        11/14/23 0946  Inpatient Admission  Once            11/13/23 1614  Place in Observation  Once                          Discharge Date: 11/16/23    Medical Problems       Resolved Problems  Date Reviewed: 11/16/2023            Resolved    Leukocytosis 11/16/2023     Resolved by  Blanquita Rodrigues MD    * (Principal) Intractable nausea and vomiting 11/16/2023     Resolved by  Blanquita Rodrigues MD    Acute cystitis without hematuria 11/16/2023     Resolved by  Blanquita Rodrigues MD    Epigastric pain 11/16/2023     Resolved by  Blanquita Rodrigues MD          Consultations During Hospital Stay:  GI    Procedures Performed:   CT abdomen pelvis with contrast   Final Result   No acute findings.                Workstation performed: [id number]             Significant Findings / Test Results:   Results for orders placed or performed during the hospital encounter of 11/13/23   FLU/RSV/COVID - if FLU/RSV clinically relevant    Specimen: Nose; Nares   Result Value Ref Range    SARS-CoV-2 Negative Negative    INFLUENZA A PCR Negative Negative    INFLUENZA B PCR Negative Negative    RSV PCR Negative Negative   Urine culture    Specimen: Urine, Clean Catch   Result Value Ref Range    Urine Culture No Growth <1000 cfu/mL    Stool Enteric Bacterial Panel by PCR    Specimen: Per Rectum; Stool   Result Value Ref Range    Salmonella sp PCR None Detected None Detected    Shigella sp/Enteroinvasive E. coli (EIEC) PCR None Detected None Detected    Campylobacter sp (jejuni and coli) PCR None Detected None Detected    Shiga toxin 1/Shiga toxin 2 genes PCR None Detected None Detected   Rotavirus antigen, stool   Result Value Ref Range    Rotavirus Negative Negative   Blood gas, venous   Result Value Ref Range    pH, Rupesh 7.459 (H) 7.300 - 7.400    pCO2, Rupesh 35.7 (L) 42.0 - 50.0 mm Hg    pO2, Rupesh 37.1 35.0 - 45.0 mm Hg    HCO3, Rupesh 24.8 24 - 30 mmol/L    Base Excess, Rupesh 1.3 mmol/L    O2 Content, Rupesh 14.7 ml/dL    O2 HGB, VENOUS 72.6 60.0 - 80.0 %   CBC and differential   Result Value Ref Range    WBC 13.89 (H) 4.31 - 10.16 Thousand/uL    RBC 4.93 3.81 - 5.12 Million/uL    Hemoglobin 13.7 11.5 - 15.4 g/dL    Hematocrit 42.3 34.8 - 46.1 %    MCV 86 82 - 98 fL    MCH 27.8 26.8 - 34.3 pg    MCHC 32.4 31.4 - 37.4 g/dL    RDW 14.1 11.6 - 15.1 %    MPV 10.1 8.9 - 12.7 fL    Platelets 513 701 - 849 Thousands/uL    nRBC 0 /100 WBCs    Neutrophils Relative 81 (H) 43 - 75 %    Immat GRANS % 0 0 - 2 %    Lymphocytes Relative 13 (L) 14 - 44 %    Monocytes Relative 5 4 - 12 %    Eosinophils Relative 0 0 - 6 %    Basophils Relative 1 0 - 1 %    Neutrophils Absolute 11.13 (H) 1.85 - 7.62 Thousands/µL    Immature Grans Absolute 0.05 0.00 - 0.20 Thousand/uL    Lymphocytes Absolute 1.83 0.60 - 4.47 Thousands/µL    Monocytes Absolute 0.74 0.17 - 1.22 Thousand/µL    Eosinophils Absolute 0.05 0.00 - 0.61 Thousand/µL    Basophils Absolute 0.09 0.00 - 0.10 Thousands/µL   Comprehensive metabolic panel   Result Value Ref Range    Sodium 136 135 - 147 mmol/L    Potassium 4.0 3.5 - 5.3 mmol/L    Chloride 98 96 - 108 mmol/L    CO2 25 21 - 32 mmol/L    ANION GAP 13 mmol/L    BUN 13 5 - 25 mg/dL    Creatinine 0.69 0.60 - 1.30 mg/dL    Glucose 95 65 - 140 mg/dL    Calcium 9.8 8.4 - 10.2 mg/dL    AST 21 13 - 39 U/L    ALT 9 7 - 52 U/L    Alkaline Phosphatase 51 34 - 104 U/L    Total Protein 7.9 6.4 - 8.4 g/dL    Albumin 4.6 3.5 - 5.0 g/dL    Total Bilirubin 0.51 0.20 - 1.00 mg/dL    eGFR 116 ml/min/1.73sq m   Lipase   Result Value Ref Range    Lipase 26 11 - 82 u/L   UA w Reflex to Microscopic w Reflex to Culture    Specimen: Urine, Clean Catch   Result Value Ref Range    Color, UA Yellow     Clarity, UA Clear     Specific Gravity, UA >=1.030 1.003 - 1.030    pH, UA 6.5 4.5, 5.0, 5.5, 6.0, 6.5, 7.0, 7.5, 8.0    Leukocytes, UA Trace (A) Negative    Nitrite, UA Negative Negative    Protein, UA Trace (A) Negative mg/dl    Glucose, UA Negative Negative mg/dl    Ketones, UA >=80 (3+) (A) Negative mg/dl    Urobilinogen, UA 0.2 0.2, 1.0 E.U./dl E.U./dl    Bilirubin, UA Small (A) Negative    Occult Blood, UA Negative Negative   Rapid drug screen, urine   Result Value Ref Range    Amph/Meth UR Negative Negative    Barbiturate Ur Negative Negative    Benzodiazepine Urine Negative Negative    Cocaine Urine Negative Negative    Methadone Urine Negative Negative    Opiate Urine Negative Negative    PCP Ur Negative Negative    THC Urine Positive (A) Negative    Oxycodone Urine Negative Negative   Lactic acid, plasma (w/reflex if result > 2.0)   Result Value Ref Range    LACTIC ACID 0.9 0.5 - 2.0 mmol/L   Urine Microscopic   Result Value Ref Range    RBC, UA 0-5 None Seen, 0-1, 1-2, 2-4, 0-5 /hpf    WBC, UA 4-10 (A) None Seen, 0-1, 1-2, 0-5, 2-4 /hpf    Epithelial Cells Occasional None Seen, Occasional /hpf    Bacteria, UA Moderate (A) None Seen, Occasional /hpf    WBC Clumps OCCASSIONALLY OBSERVED     OTHER OBSERVATIONS Transitional Epithelial Cells     MUCUS THREADS Occasional (A) None Seen   APTT   Result Value Ref Range    PTT 30 23 - 37 seconds   Protime-INR   Result Value Ref Range    Protime 14.9 (H) 11.6 - 14.5 seconds    INR 1.18 0.84 - 1.19   Phosphorus   Result Value Ref Range    Phosphorus 3.5 2.7 - 4.5 mg/dL   Magnesium   Result Value Ref Range    Magnesium 2.2 1.9 - 2.7 mg/dL   Comprehensive metabolic panel   Result Value Ref Range    Sodium 139 135 - 147 mmol/L    Potassium 3.4 (L) 3.5 - 5.3 mmol/L    Chloride 104 96 - 108 mmol/L    CO2 27 21 - 32 mmol/L    ANION GAP 8 mmol/L    BUN 7 5 - 25 mg/dL    Creatinine 0.65 0.60 - 1.30 mg/dL    Glucose 72 65 - 140 mg/dL    Glucose, Fasting 72 65 - 99 mg/dL    Calcium 8.7 8.4 - 10.2 mg/dL    AST 10 (L) 13 - 39 U/L    ALT 6 (L) 7 - 52 U/L    Alkaline Phosphatase 41 34 - 104 U/L    Total Protein 6.0 (L) 6.4 - 8.4 g/dL    Albumin 3.7 3.5 - 5.0 g/dL    Total Bilirubin 0.40 0.20 - 1.00 mg/dL    eGFR 118 ml/min/1.73sq m   CBC and differential   Result Value Ref Range    WBC 10.05 4.31 - 10.16 Thousand/uL    RBC 4.11 3.81 - 5.12 Million/uL    Hemoglobin 11.3 (L) 11.5 - 15.4 g/dL    Hematocrit 35.8 34.8 - 46.1 %    MCV 87 82 - 98 fL    MCH 27.5 26.8 - 34.3 pg    MCHC 31.6 31.4 - 37.4 g/dL    RDW 13.9 11.6 - 15.1 %    MPV 9.8 8.9 - 12.7 fL    Platelets 373 148 - 865 Thousands/uL    nRBC 0 /100 WBCs    Neutrophils Relative 55 43 - 75 %    Immat GRANS % 0 0 - 2 %    Lymphocytes Relative 36 14 - 44 %    Monocytes Relative 7 4 - 12 %    Eosinophils Relative 1 0 - 6 %    Basophils Relative 1 0 - 1 %    Neutrophils Absolute 5.41 1.85 - 7.62 Thousands/µL    Immature Grans Absolute 0.03 0.00 - 0.20 Thousand/uL    Lymphocytes Absolute 3.65 0.60 - 4.47 Thousands/µL    Monocytes Absolute 0.74 0.17 - 1.22 Thousand/µL    Eosinophils Absolute 0.13 0.00 - 0.61 Thousand/µL    Basophils Absolute 0.09 0.00 - 0.10 Thousands/µL   Procalcitonin   Result Value Ref Range    Procalcitonin <0.05 <=0.25 ng/ml   APTT   Result Value Ref Range    PTT 30 23 - 37 seconds Protime-INR   Result Value Ref Range    Protime 14.1 11.6 - 14.5 seconds    INR 1.10 0.84 - 1.19   Phosphorus   Result Value Ref Range    Phosphorus 3.9 2.7 - 4.5 mg/dL   Magnesium   Result Value Ref Range    Magnesium 2.0 1.9 - 2.7 mg/dL   Comprehensive metabolic panel   Result Value Ref Range    Sodium 139 135 - 147 mmol/L    Potassium 3.5 3.5 - 5.3 mmol/L    Chloride 105 96 - 108 mmol/L    CO2 23 21 - 32 mmol/L    ANION GAP 11 mmol/L    BUN 4 (L) 5 - 25 mg/dL    Creatinine 0.61 0.60 - 1.30 mg/dL    Glucose 83 65 - 140 mg/dL    Calcium 9.5 8.4 - 10.2 mg/dL    AST 9 (L) 13 - 39 U/L    ALT 5 (L) 7 - 52 U/L    Alkaline Phosphatase 46 34 - 104 U/L    Total Protein 6.5 6.4 - 8.4 g/dL    Albumin 3.9 3.5 - 5.0 g/dL    Total Bilirubin 0.48 0.20 - 1.00 mg/dL    eGFR 121 ml/min/1.73sq m   CBC and differential   Result Value Ref Range    WBC 9.88 4.31 - 10.16 Thousand/uL    RBC 4.41 3.81 - 5.12 Million/uL    Hemoglobin 12.1 11.5 - 15.4 g/dL    Hematocrit 38.0 34.8 - 46.1 %    MCV 86 82 - 98 fL    MCH 27.4 26.8 - 34.3 pg    MCHC 31.8 31.4 - 37.4 g/dL    RDW 13.7 11.6 - 15.1 %    MPV 9.5 8.9 - 12.7 fL    Platelets 195 202 - 025 Thousands/uL    nRBC 0 /100 WBCs    Neutrophils Relative 65 43 - 75 %    Immat GRANS % 0 0 - 2 %    Lymphocytes Relative 27 14 - 44 %    Monocytes Relative 7 4 - 12 %    Eosinophils Relative 0 0 - 6 %    Basophils Relative 1 0 - 1 %    Neutrophils Absolute 6.38 1.85 - 7.62 Thousands/µL    Immature Grans Absolute 0.03 0.00 - 0.20 Thousand/uL    Lymphocytes Absolute 2.67 0.60 - 4.47 Thousands/µL    Monocytes Absolute 0.71 0.17 - 1.22 Thousand/µL    Eosinophils Absolute 0.04 0.00 - 0.61 Thousand/µL    Basophils Absolute 0.05 0.00 - 0.10 Thousands/µL   Procalcitonin   Result Value Ref Range    Procalcitonin <0.05 <=0.25 ng/ml   Protime-INR   Result Value Ref Range    Protime 14.4 11.6 - 14.5 seconds    INR 1.13 0.84 - 1.19   Phosphorus   Result Value Ref Range    Phosphorus 4.0 2.7 - 4.5 mg/dL   Magnesium Result Value Ref Range    Magnesium 2.0 1.9 - 2.7 mg/dL   Comprehensive metabolic panel   Result Value Ref Range    Sodium 140 135 - 147 mmol/L    Potassium 3.6 3.5 - 5.3 mmol/L    Chloride 104 96 - 108 mmol/L    CO2 27 21 - 32 mmol/L    ANION GAP 9 mmol/L    BUN 7 5 - 25 mg/dL    Creatinine 0.69 0.60 - 1.30 mg/dL    Glucose 88 65 - 140 mg/dL    Calcium 9.4 8.4 - 10.2 mg/dL    AST 10 (L) 13 - 39 U/L    ALT 6 (L) 7 - 52 U/L    Alkaline Phosphatase 46 34 - 104 U/L    Total Protein 6.3 (L) 6.4 - 8.4 g/dL    Albumin 3.8 3.5 - 5.0 g/dL    Total Bilirubin 0.52 0.20 - 1.00 mg/dL    eGFR 116 ml/min/1.73sq m   CBC and differential   Result Value Ref Range    WBC 9.37 4.31 - 10.16 Thousand/uL    RBC 4.48 3.81 - 5.12 Million/uL    Hemoglobin 12.6 11.5 - 15.4 g/dL    Hematocrit 38.9 34.8 - 46.1 %    MCV 87 82 - 98 fL    MCH 28.1 26.8 - 34.3 pg    MCHC 32.4 31.4 - 37.4 g/dL    RDW 13.9 11.6 - 15.1 %    MPV 9.5 8.9 - 12.7 fL    Platelets 724 082 - 973 Thousands/uL    nRBC 0 /100 WBCs    Neutrophils Relative 62 43 - 75 %    Immat GRANS % 0 0 - 2 %    Lymphocytes Relative 29 14 - 44 %    Monocytes Relative 7 4 - 12 %    Eosinophils Relative 1 0 - 6 %    Basophils Relative 1 0 - 1 %    Neutrophils Absolute 5.79 1.85 - 7.62 Thousands/µL    Immature Grans Absolute 0.03 0.00 - 0.20 Thousand/uL    Lymphocytes Absolute 2.74 0.60 - 4.47 Thousands/µL    Monocytes Absolute 0.66 0.17 - 1.22 Thousand/µL    Eosinophils Absolute 0.07 0.00 - 0.61 Thousand/µL    Basophils Absolute 0.08 0.00 - 0.10 Thousands/µL   Procalcitonin   Result Value Ref Range    Procalcitonin <0.05 <=0.25 ng/ml   POCT pregnancy, urine   Result Value Ref Range    EXT Preg Test, Ur Negative     Control Valid        Incidental Findings:   Persistent elevation of WBC on lab work     Test Results Pending at Discharge (will require follow up): Fecal H. Pylori test 2 weeks after completing the quadruple therapy to confirm eradication.       Outpatient Tests Requested:  None    Complications:  None    Reason for Admission: vomiting    Hospital Course:     34 y.o. female PMH asthma, colitis (resolved), multiple bouts of nausea and vomiting since 2021, presents on 11/13 with multiple episodes of vomiting. Her outpatient lab for H pylori antigen stool test on 11/10 tested positive for the infection. Consulted gastroenterology, who performed endoscopy on 11/13 showed normal findings. She was started on quadruple therapy on 11/14 for H pylori infection, to be complete on 11/29. She has improvement in her nausea and vomiting, resolution of chest tightness, and tolerated solid diet. She is discharged on 11/16, with recommendation to recheck H pylori stool culture after 2 weeks of finishing her antibiotics. If symptom persistent, GI recommend gastric emptying test and patient was agreeable to continue the diagnostic workup as outpatient. Please see above list of diagnoses and related plan for additional information. Condition at Discharge: stable     Discharge Day Visit / Exam:     Subjective:  Reports constipation has resolved. Not having epigastric pain. Able to tolerate solid food yesterday for lunch. She feels asthma symptoms are under better control with the additional of ICS. Vitals: Blood Pressure: 107/65 (11/16/23 0009)  Pulse: 83 (11/16/23 0009)  Temperature: 98.6 °F (37 °C) (11/16/23 0009)  Temp Source: Oral (11/16/23 0009)  Respirations: 20 (11/16/23 0009)  Height: 5' 4" (162.6 cm) (11/13/23 1633)  Weight - Scale: 83.7 kg (184 lb 8.4 oz) (11/13/23 1633)  SpO2: 96 % (11/16/23 0009)    Exam:   Physical Exam  Vitals and nursing note reviewed. Constitutional:       General: She is not in acute distress. Appearance: She is well-developed. HENT:      Head: Normocephalic and atraumatic. Nose: Nose normal. No congestion. Mouth/Throat:      Mouth: Mucous membranes are moist.      Pharynx: Oropharynx is clear.    Eyes:      Extraocular Movements: Extraocular movements intact. Conjunctiva/sclera: Conjunctivae normal.   Cardiovascular:      Rate and Rhythm: Normal rate and regular rhythm. Pulses: Normal pulses. Heart sounds: Normal heart sounds. No murmur heard. Pulmonary:      Effort: Pulmonary effort is normal. No respiratory distress. Breath sounds: Normal breath sounds. No stridor. No wheezing or rales. Abdominal:      General: Abdomen is flat. Bowel sounds are normal. There is no distension. Palpations: Abdomen is soft. There is no mass. Tenderness: There is no abdominal tenderness. Musculoskeletal:         General: No swelling. Cervical back: Neck supple. Right lower leg: No edema. Left lower leg: No edema. Skin:     General: Skin is warm and dry. Neurological:      General: No focal deficit present. Mental Status: She is alert. Mental status is at baseline. Psychiatric:         Mood and Affect: Mood normal.         Behavior: Behavior normal.          Discussion with Family: mother at bedside    Discharge instructions/Information to patient and family:   See after visit summary for information provided to patient and family. Provisions for Follow-Up Care:  See after visit summary for information related to follow-up care and any pertinent home health orders. Disposition:     Home    For Discharges to [facility] SNF:   Not Applicable to this Patient - Not Applicable to this Patient    Planned Readmission: No     Discharge Statement:  I spent 60 minutes discharging the patient. This time was spent on the day of discharge. I had direct contact with the patient on the day of discharge. Greater than 50% of the total time was spent examining patient, answering all patient questions, arranging and discussing plan of care with patient as well as directly providing post-discharge instructions. Additional time then spent on discharge activities.     Discharge Medications:  See after visit summary for reconciled discharge medications provided to patient and family.       ** Please Note: This note has been constructed using a voice recognition system **

## 2023-11-16 NOTE — PLAN OF CARE
Problem: PAIN - ADULT  Goal: Verbalizes/displays adequate comfort level or baseline comfort level  Description: Interventions:  - Encourage patient to monitor pain and request assistance  - Assess pain using appropriate pain scale  - Administer analgesics based on type and severity of pain and evaluate response  - Implement non-pharmacological measures as appropriate and evaluate response  - Consider cultural and social influences on pain and pain management  - Notify physician/advanced practitioner if interventions unsuccessful or patient reports new pain  Outcome: Progressing     Problem: INFECTION - ADULT  Goal: Absence or prevention of progression during hospitalization  Description: INTERVENTIONS:  - Assess and monitor for signs and symptoms of infection  - Monitor lab/diagnostic results  - Monitor all insertion sites, i.e. indwelling lines, tubes, and drains  - Monitor endotracheal if appropriate and nasal secretions for changes in amount and color  - [place] appropriate cooling/warming therapies per order  - Administer medications as ordered  - Instruct and encourage patient and family to use good hand hygiene technique  - Identify and instruct in appropriate isolation precautions for identified infection/condition  Outcome: Progressing     Problem: SAFETY ADULT  Goal: Patient will remain free of falls  Description: INTERVENTIONS:  - Educate patient/family on patient safety including physical limitations  - Instruct patient to call for assistance with activity   - Consult OT/PT to assist with strengthening/mobility   - Keep Call bell within reach  - Keep bed low and locked with side rails adjusted as appropriate  - Keep care items and personal belongings within reach  - Initiate and maintain comfort rounds  - Make Fall Risk Sign visible to staff  - Offer Toileting every 2 Hours, in advance of need  - Initiate/Maintain bed/chair alarm  - Obtain necessary fall risk management equipment:   - Apply yellow socks and bracelet for high fall risk patients  - Consider moving patient to room near nurses station  Outcome: Progressing  Goal: Maintain or return to baseline ADL function  Description: INTERVENTIONS:  -  Assess patient's ability to carry out ADLs; assess patient's baseline for ADL function and identify physical deficits which impact ability to perform ADLs (bathing, care of mouth/teeth, toileting, grooming, dressing, etc.)  - Assess/evaluate cause of self-care deficits   - Assess range of motion  - Assess patient's mobility; develop plan if impaired  - Assess patient's need for assistive devices and provide as appropriate  - Encourage maximum independence but intervene and supervise when necessary  - Involve family in performance of ADLs  - Assess for home care needs following discharge   - Consider OT consult to assist with ADL evaluation and planning for discharge  - Provide patient education as appropriate  Outcome: Progressing  Goal: Maintains/Returns to pre admission functional level  Description: INTERVENTIONS:  - Perform BMAT or MOVE assessment daily.   - Set and communicate daily mobility goal to care team and patient/family/caregiver. - Collaborate with rehabilitation services on mobility goals if consulted  - Perform Range of Motion 3 times a day. - Reposition patient every 2 hours.   - Dangle patient 3 times a day  - Stand patient 3 times a day  - Ambulate patient 3 times a day  - Out of bed to chair 3 times a day   - Out of bed for meals 3 times a day  - Out of bed for toileting  - Record patient progress and toleration of activity level   Outcome: Progressing     Problem: DISCHARGE PLANNING  Goal: Discharge to home or other facility with appropriate resources  Description: INTERVENTIONS:  - Identify barriers to discharge w/patient and caregiver  - Arrange for needed discharge resources and transportation as appropriate  - Identify discharge learning needs (meds, wound care, etc.)  - Arrange for interpretive services to assist at discharge as needed  - Refer to Case Management Department for coordinating discharge planning if the patient needs post-hospital services based on physician/advanced practitioner order or complex needs related to functional status, cognitive ability, or social support system  Outcome: Progressing     Problem: Knowledge Deficit  Goal: Patient/family/caregiver demonstrates understanding of disease process, treatment plan, medications, and discharge instructions  Description: Complete learning assessment and assess knowledge base. Interventions:  - Provide teaching at level of understanding  - Provide teaching via preferred learning methods  Outcome: Progressing     Problem: Nutrition/Hydration-ADULT  Goal: Nutrient/Hydration intake appropriate for improving, restoring or maintaining nutritional needs  Description: Monitor and assess patient's nutrition/hydration status for malnutrition. Collaborate with interdisciplinary team and initiate plan and interventions as ordered. Monitor patient's weight and dietary intake as ordered or per policy. Utilize nutrition screening tool and intervene as necessary. Determine patient's food preferences and provide high-protein, high-caloric foods as appropriate.      INTERVENTIONS:  - Monitor oral intake, urinary output, labs, and treatment plans  - Assess nutrition and hydration status and recommend course of action  - Evaluate amount of meals eaten  - Assist patient with eating if necessary   - Allow adequate time for meals  - Recommend/ encourage appropriate diets, oral nutritional supplements, and vitamin/mineral supplements  - Order, calculate, and assess calorie counts as needed  - Recommend, monitor, and adjust tube feedings and TPN/PPN based on assessed needs  - Assess need for intravenous fluids  - Provide specific nutrition/hydration education as appropriate  - Include patient/family/caregiver in decisions related to nutrition  Outcome: Progressing

## 2023-11-16 NOTE — CASE MANAGEMENT
Case Management Discharge Planning Note    Patient name Susan Byrd /[id number] MRN [id number]  : 1990 Date 2023       Current Admission Date: 2023  Current Admission Diagnosis:Hepatic steatosis   Patient Active Problem List    Diagnosis Date Noted    Hepatic steatosis 2023    Positive H. pylori test 2023    Mild persistent asthma without complication 6730    Intractable abdominal pain 2021    Liver lesion 2021    Ventricular ectopy 2021    Tobacco use 2018      LOS (days): 2  Geometric Mean LOS (GMLOS) (days):   Days to GMLOS:     OBJECTIVE:  Risk of Unplanned Readmission Score: 11.28         Current admission status: Inpatient   Preferred Pharmacy:   [id number] [place] [facility]  [address] [place] [id number]  Phone: [phone] Fax: 3063 [facility] [address]  Phone: [phone] Fax: [phone]    Primary Care Provider: Caleb Montgomery DO    Primary Insurance: [employer]  Secondary Insurance:     DISCHARGE DETAILS:  Pt is being dc'd home on this date. Medications being sent to [facility]:Meds to beds. Pt/family will come back later today to pick them up.

## 2023-11-17 DIAGNOSIS — Z22.1 CLOSTRIDIUM DIFFICILE CARRIER: Primary | ICD-10-CM

## 2023-11-17 PROCEDURE — 88305 TISSUE EXAM BY PATHOLOGIST: CPT | Performed by: PATHOLOGY

## 2023-11-17 PROCEDURE — 88342 IMHCHEM/IMCYTCHM 1ST ANTB: CPT | Performed by: PATHOLOGY

## 2023-11-17 RX ORDER — VANCOMYCIN HYDROCHLORIDE 125 MG/1
125 CAPSULE ORAL 4 TIMES DAILY
Qty: 40 CAPSULE | Refills: 0 | Status: SHIPPED | OUTPATIENT
Start: 2023-11-17 | End: 2023-11-27

## 2023-11-17 NOTE — RESULT ENCOUNTER NOTE
Good morning. Your C diff culture came back positive for C diff colonization. This is a type of bacteria that causes gastrointestinal illness such as diarrhea and abdominal cramp. Since you were having symptoms, we recommend treating with oral vancomycin 125mg four times daily for 10 days. I have sent a script to your pharmacy this morning [id number], [address] [place] [address] Ghanshyam Aguilar. Please start taking this medication for the next 10 days. Thank you.

## 2023-11-17 NOTE — RESULT ENCOUNTER NOTE
Results relayed via Mychart  Duodenal pathology with increased intraepithelial lymphocytes and normal villous architecture. Patient has previously had negative celiac serologies. Gastric biopsy are unremarkable.   She is scheduled for an office visit on 12/27

## 2023-11-19 LAB
G LAMBLIA AG STL QL IA: NEGATIVE
O+P STL CONC: NORMAL

## 2023-11-20 LAB — CALPROTECTIN STL-MCNT: 66 UG/G (ref 0–120)

## 2023-12-27 ENCOUNTER — OFFICE VISIT (OUTPATIENT)
Dept: GASTROENTEROLOGY | Facility: CLINIC | Age: 33
End: 2023-12-27
Payer: COMMERCIAL

## 2023-12-27 ENCOUNTER — APPOINTMENT (OUTPATIENT)
Dept: LAB | Facility: HOSPITAL | Age: 33
End: 2023-12-27

## 2023-12-27 VITALS
HEIGHT: 65 IN | HEART RATE: 87 BPM | OXYGEN SATURATION: 97 % | WEIGHT: 183.2 LBS | BODY MASS INDEX: 30.52 KG/M2 | DIASTOLIC BLOOD PRESSURE: 76 MMHG | SYSTOLIC BLOOD PRESSURE: 108 MMHG | RESPIRATION RATE: 18 BRPM | TEMPERATURE: 97.7 F

## 2023-12-27 DIAGNOSIS — Z12.11 SCREENING FOR COLON CANCER: ICD-10-CM

## 2023-12-27 DIAGNOSIS — R10.9 INTRACTABLE ABDOMINAL PAIN: ICD-10-CM

## 2023-12-27 DIAGNOSIS — K31.84 GASTROPARESIS: ICD-10-CM

## 2023-12-27 DIAGNOSIS — K59.00 CONSTIPATION, UNSPECIFIED CONSTIPATION TYPE: ICD-10-CM

## 2023-12-27 DIAGNOSIS — A04.8 POSITIVE H. PYLORI TEST: Primary | ICD-10-CM

## 2023-12-27 DIAGNOSIS — K76.0 HEPATIC STEATOSIS: ICD-10-CM

## 2023-12-27 PROCEDURE — 99214 OFFICE O/P EST MOD 30 MIN: CPT | Performed by: NURSE PRACTITIONER

## 2023-12-27 RX ORDER — PANTOPRAZOLE SODIUM 40 MG/1
40 TABLET, DELAYED RELEASE ORAL 2 TIMES DAILY
Qty: 24 TABLET | Refills: 0 | Status: CANCELLED | OUTPATIENT
Start: 2023-12-27 | End: 2024-01-08

## 2023-12-27 NOTE — PROGRESS NOTES
[facility] - Outpatient Follow-up Note  Izabel Thomas 33 y.o. female MRN: [id number]  Encounter: [id number]          ASSESSMENT AND PLAN:    The patient presents today for follow-up office visit regarding her previous intractable abdominal pain, positive H. pylori, chronic constipation, gastroparesis, and history of possible liver lesion with hepatic steatosis.    Exam: Abdomen is soft, nondistended, nontender with hypoactive bowel sounds x 4. No edema noted of the b/l lower extremities upon exam today. Skin is non-icteric.      1. Positive H. pylori test  2. Intractable abdominal pain  3. Gastroparesis  Improved/stable    We will proceed with the H. pylori stool antigen testing to confirm eradication from the previously administered quadruple therapy.  I advised patient once have the results of the stool study we will call her to review the results.    I reminded the patient that with gastroparesis, that it is in their best interest to continue to try to eat 4-6 small meals daily, stay hydrated, reduce fat intake, reduce fiber intake, chew food thoroughly, and to eat nutritious foods first before filling up on snacks or empty calories. The patient was agreeable and verbalized an understanding.      - H. pylori antigen, stool; Future    3. Constipation, unspecified constipation type  Improved    I discussed with the patient that because of the gastroparesis and she is feeling better overall, I feel it is in her best interest to make sure she stays on some kind of bowel regimen and at the very least I would like her to go back to taking the MiraLAX every other day and if she starts to feel as though she is becoming constipated go back to daily use.  The patient was agreeable and verbalized an understanding.    Encouraged the patient to continue to try to drink as close to 64 ounces of water daily.    4. Screening for colon cancer  I discussed with the patient today that  since they are not currently experiencing any red flag symptoms, we will hold off on any repeat colonoscopies until the recommended surveillance date unless the patient would start to develop red flag symptoms in the future.  The patient was agreeable and verbalized an understanding.  DUE: 6/1/28    While the patient was in the office today we did review GI red flag symptoms, including, but not limited to: chronic nausea, vomiting, diarrhea, chills, fever, and unintentional weight loss and should call or contact our office with any changes or concerns. I reviewed with the patient that if they notice any blood while vomiting or in their stool they should contact or office or go to the nearest emergency room for immediate evaluation. The patient was agreeable and verbalized an understanding.     6. Hepatic steatosis  Stable    Discussed recommendations in regards to fatty liver including:   Strict control of contributing comorbidities (obesity, prediabetes/diabetes, hypertension, and hypertriglyceridemia).  Weight loss of approx 10-15% of patient's current body weight over a period of 6-12 months through low fat diet and cardiovascular exercise as tolerated.   Limiting alcohol consumption, preferably complete abstinence.  Monitor hepatic function every 6 months with routine labs.      - Bilirubin, direct; Future  - CBC and differential; Future  - Comprehensive metabolic panel; Future     The patient will schedule a follow up office visit in 6 months, but understands to call or contact our office if there are any issues or concerns in the mean time.  ______________________________________________________________________    SUBJECTIVE: Patient is a 33 y.o. female who was previously seen in our office on 11/10/23 by MINNIE Quiñonez PA-C and presents today for follow-up office visit regarding her previous intractable abdominal pain, positive H. pylori, chronic constipation, gastroparesis, and history of possible liver lesion  with hepatic steatosis.  The patient reports that since her most recent hospitalization on November 13, 2023 where she did proceed with an EGD and the biopsy was positive for H. pylori, the patient did proceed with the quadruple treatment therapy and has noted complete resolution of her symptoms.  The patient has not yet proceeded with the H. pylori stool antigen testing to confirm eradication.    The patient also reports that since being discharged from the hospital she was using MiraLAX on a regular basis, which has significantly helped her constipation, but does reports she has not been using it as regularly as she was as she thought she did not need to keep taking it, but is noticing that her stool started to get harder.  The patient also reports that at times she has not been as good of a job at drinking more water she should but has found that somewhat is helpful as well.    The patient denies any reflux, nausea, dysphagia, vomiting, decreased appetite, unplanned weight loss, or abdominal pain.     The patient reports that they have a BM daily and reports that it is relieving, without any bloating, consistent diarrhea, nocturnal BMs, constipation, straining, melena or bloody stools.     Meds: MiraLAX as needed  NSAID Use: Denied    Imaging: (11/13/27): CT of the abdomen and pelvis with contrast: Normal.    Endoscopy History: EGD: (11/14/23): Normal. Path: + H. Pylori.     COLONOSCOPY: (6/1/23): 10 benign polys removed with the recommendation to proceed with a repeat colonoscopy in 5 years.     DUE: 6/1/28    REVIEW OF SYSTEMS IS OTHERWISE NEGATIVE.      Historical Information   Past Medical History:   Diagnosis Date    Asthma     Closed displaced fracture of acromial end of right clavicle with routine healing 08/14/2018     Past Surgical History:   Procedure Laterality Date    CYST REMOVAL      SHOULDER SURGERY      R shoulder    TONSILLECTOMY       Social History   Social History     Substance and Sexual  "Activity   Alcohol Use Not Currently    Comment: socailly     Social History     Substance and Sexual Activity   Drug Use Yes    Types: Marijuana     Social History     Tobacco Use   Smoking Status Every Day    Current packs/day: 0.25    Types: Cigarettes   Smokeless Tobacco Never     History reviewed. No pertinent family history.    Meds/Allergies       Current Outpatient Medications:     acetaminophen (TYLENOL) 325 mg tablet    albuterol (ProAir HFA) 90 mcg/act inhaler    fluticasone (ARNUITY ELLIPTA) 100 MCG/ACT AEPB inhaler    pantoprazole (PROTONIX) 40 mg tablet    No Known Allergies        Objective     Blood pressure 108/76, pulse 87, temperature 97.7 °F (36.5 °C), temperature source Tympanic, resp. rate 18, height 5' 5\" (1.651 m), weight 83.1 kg (183 lb 3.2 oz), SpO2 97%. Body mass index is 30.49 kg/m².      PHYSICAL EXAM:      General Appearance:   Alert, cooperative, no distress   HEENT:   Normocephalic, atraumatic, anicteric.     Neck:  Supple, symmetrical, trachea midline   Lungs:   Clear to auscultation bilaterally; no rales, rhonchi or wheezing; respirations unlabored    Heart::   Regular rate and rhythm; no murmur, rub, or gallop.   Abdomen:   Soft, non-tender, non-distended; normal bowel sounds; no masses, no organomegaly    Genitalia:   Deferred    Rectal:   Deferred    Extremities:  No cyanosis, clubbing or edema    Pulses:  2+ and symmetric    Skin:  No jaundice, rashes, or lesions    Lymph nodes:  No palpable cervical lymphadenopathy        Lab Results:   No visits with results within 1 Day(s) from this visit.   Latest known visit with results is:   No results displayed because visit has over 200 results.            Radiology Results:   No results found.   Answers submitted by the patient for this visit:  Abdominal Pain Questionnaire (Submitted on 12/20/2023)  Chief Complaint: Abdominal pain  Chronicity: recurrent  Onset: more than 1 month ago  Onset quality: undetermined  Frequency: " rarely  Episode duration: 5 Days  Progression since onset: rapidly improving  Pain location: epigastric region, suprapubic region  Pain - numeric: 10/10  Pain quality: aching, cramping  Radiates to: epigastric region, suprapubic region, back  anorexia: No  arthralgias: No  belching: No  constipation: No  diarrhea: No  dysuria: No  fever: No  flatus: No  frequency: No  headaches: No  hematochezia: No  hematuria: No  melena: No  myalgias: No  nausea: No  weight loss: No  vomiting: No  Aggravated by: bowel movement, certain positions, eating, vomiting  Relieved by: nothing, certain positions, recumbency  Diagnostic workup: GI consult, lower endoscopy, ultrasound, upper endoscopy

## 2023-12-27 NOTE — PATIENT INSTRUCTIONS
Proceed with H. Pylori stool test.   Continue the miralax at least every other day.   Continue to drink at least 64 oz of water daily.   Proceed with repeat blood work in May 2024.  Schedule a f/u in 6 months.   Continue to monitor for red flag symptoms.     Discussed recommendations in regards to fatty liver including:   Strict control of contributing comorbidities (obesity, prediabetes/diabetes, hypertension, and hypertriglyceridemia).  Weight loss of approx 10-15% of patient's current body weight over a period of 6-12 months through low fat diet and cardiovascular exercise as tolerated.   Limiting alcohol consumption, preferably complete abstinence.  Monitor hepatic function every 6 months with routine labs.      While the patient was in the office today we did review GI red flag symptoms, including, but not limited to: chronic nausea, vomiting, diarrhea, chills, fever, and unintentional weight loss and should call or contact our office with any changes or concerns. I reviewed with the patient that if they notice any blood while vomiting or in their stool they should contact or office or go to the nearest emergency room for immediate evaluation. The patient was agreeable and verbalized an understanding.

## 2023-12-28 PROBLEM — Z12.11 SCREENING FOR COLON CANCER: Status: ACTIVE | Noted: 2023-12-28

## 2023-12-28 PROBLEM — K31.84 GASTROPARESIS: Status: ACTIVE | Noted: 2023-12-28

## 2023-12-28 PROBLEM — K59.00 CONSTIPATION: Status: ACTIVE | Noted: 2023-12-28

## 2024-02-21 PROBLEM — Z12.11 SCREENING FOR COLON CANCER: Status: RESOLVED | Noted: 2023-12-28 | Resolved: 2024-02-21

## 2024-04-05 ENCOUNTER — APPOINTMENT (OUTPATIENT)
Dept: LAB | Facility: CLINIC | Age: 34
End: 2024-04-05
Payer: COMMERCIAL

## 2024-04-05 PROCEDURE — 83993 ASSAY FOR CALPROTECTIN FECAL: CPT

## 2024-04-05 PROCEDURE — 87505 NFCT AGENT DETECTION GI: CPT

## 2024-04-05 PROCEDURE — 87329 GIARDIA AG IA: CPT

## 2024-04-06 LAB
C COLI+JEJUNI TUF STL QL NAA+PROBE: NEGATIVE
EC STX1+STX2 GENES STL QL NAA+PROBE: NEGATIVE
SALMONELLA SP SPAO STL QL NAA+PROBE: NEGATIVE
SHIGELLA SP+EIEC IPAH STL QL NAA+PROBE: NEGATIVE

## 2024-04-08 LAB — G LAMBLIA AG STL QL IA: NEGATIVE

## 2024-04-09 LAB — CALPROTECTIN STL-MCNT: 115 UG/G (ref 0–120)
